# Patient Record
Sex: MALE | Race: WHITE | NOT HISPANIC OR LATINO | Employment: OTHER | URBAN - METROPOLITAN AREA
[De-identification: names, ages, dates, MRNs, and addresses within clinical notes are randomized per-mention and may not be internally consistent; named-entity substitution may affect disease eponyms.]

---

## 2017-02-10 ENCOUNTER — PATIENT MESSAGE (OUTPATIENT)
Dept: RESEARCH | Facility: HOSPITAL | Age: 77
End: 2017-02-10

## 2017-04-20 RX ORDER — HYDROCORTISONE 25 MG/G
CREAM TOPICAL
Qty: 28 G | Refills: 11 | Status: SHIPPED | OUTPATIENT
Start: 2017-04-20 | End: 2017-12-19

## 2017-04-20 NOTE — TELEPHONE ENCOUNTER
----- Message from Daiana Tucker sent at 4/20/2017 12:59 PM CDT -----  Contact: Patient: [phone]  Patient called and stated that he needs a refill on his eszopiclone (LUNESTA) 3 mg Tab, crestor, and altace medicine.    Pharmacy used is [facility] [address] - [address] [phone] (Phone) [phone] (Fax)    Patient can be reached at [phone]  Please call when done.    Thanks

## 2017-04-21 DIAGNOSIS — I25.10 CORONARY ARTERY DISEASE INVOLVING NATIVE CORONARY ARTERY WITHOUT ANGINA PECTORIS, UNSPECIFIED WHETHER NATIVE OR TRANSPLANTED HEART: ICD-10-CM

## 2017-04-21 RX ORDER — RAMIPRIL 5 MG/1
5 CAPSULE ORAL DAILY
Qty: 90 CAPSULE | Refills: 3 | Status: SHIPPED | OUTPATIENT
Start: 2017-04-21 | End: 2017-07-07 | Stop reason: SDUPTHER

## 2017-04-21 RX ORDER — ESZOPICLONE 3 MG/1
TABLET, FILM COATED ORAL
Qty: 30 TABLET | Refills: 4 | Status: SHIPPED | OUTPATIENT
Start: 2017-04-21 | End: 2017-09-10 | Stop reason: SDUPTHER

## 2017-04-21 NOTE — TELEPHONE ENCOUNTER
----- Message from Daiana Tucker sent at 4/20/2017 12:59 PM CDT -----  Contact: Patient: [phone]  Patient called and stated that he needs a refill on his eszopiclone (LUNESTA) 3 mg Tab, crestor, and altace medicine.    Pharmacy used is [facility] [address] - [address] [phone] (Phone) [phone] (Fax)    Patient can be reached at [phone]  Please call when done.    Thanks

## 2017-07-05 ENCOUNTER — LAB VISIT (OUTPATIENT)
Dept: LAB | Facility: HOSPITAL | Age: 77
End: 2017-07-05
Attending: INTERNAL MEDICINE
Payer: MEDICARE

## 2017-07-05 ENCOUNTER — CLINICAL SUPPORT (OUTPATIENT)
Dept: CARDIOLOGY | Facility: CLINIC | Age: 77
End: 2017-07-05
Payer: MEDICARE

## 2017-07-05 DIAGNOSIS — I25.10 CORONARY ARTERY DISEASE INVOLVING NATIVE CORONARY ARTERY WITHOUT ANGINA PECTORIS, UNSPECIFIED WHETHER NATIVE OR TRANSPLANTED HEART: ICD-10-CM

## 2017-07-05 DIAGNOSIS — E78.5 HYPERLIPIDEMIA: ICD-10-CM

## 2017-07-05 LAB
CHOLEST/HDLC SERPL: 2.4 {RATIO}
DIASTOLIC DYSFUNCTION: NO
HDL/CHOLESTEROL RATIO: 41.7 %
HDLC SERPL-MCNC: 144 MG/DL
HDLC SERPL-MCNC: 60 MG/DL
LDLC SERPL CALC-MCNC: 69.4 MG/DL
NONHDLC SERPL-MCNC: 84 MG/DL
RETIRED EF AND QEF - SEE NOTES: 60 (ref 55–65)
TRIGL SERPL-MCNC: 73 MG/DL

## 2017-07-05 PROCEDURE — 93321 DOPPLER ECHO F-UP/LMTD STD: CPT | Mod: 26,S$PBB,, | Performed by: INTERNAL MEDICINE

## 2017-07-05 PROCEDURE — 93351 STRESS TTE COMPLETE: CPT | Mod: PBBFAC,PO | Performed by: INTERNAL MEDICINE

## 2017-07-07 ENCOUNTER — OFFICE VISIT (OUTPATIENT)
Dept: CARDIOLOGY | Facility: CLINIC | Age: 77
End: 2017-07-07
Payer: MEDICARE

## 2017-07-07 VITALS
WEIGHT: 213.88 LBS | HEIGHT: 71 IN | DIASTOLIC BLOOD PRESSURE: 77 MMHG | SYSTOLIC BLOOD PRESSURE: 134 MMHG | BODY MASS INDEX: 29.94 KG/M2 | HEART RATE: 57 BPM | OXYGEN SATURATION: 97 %

## 2017-07-07 DIAGNOSIS — I25.10 CORONARY ARTERY DISEASE INVOLVING NATIVE CORONARY ARTERY WITHOUT ANGINA PECTORIS, UNSPECIFIED WHETHER NATIVE OR TRANSPLANTED HEART: ICD-10-CM

## 2017-07-07 DIAGNOSIS — I25.10 CORONARY ARTERY DISEASE, ANGINA PRESENCE UNSPECIFIED, UNSPECIFIED VESSEL OR LESION TYPE, UNSPECIFIED WHETHER NATIVE OR TRANSPLANTED HEART: Primary | ICD-10-CM

## 2017-07-07 DIAGNOSIS — E78.5 HYPERLIPIDEMIA, UNSPECIFIED HYPERLIPIDEMIA TYPE: ICD-10-CM

## 2017-07-07 DIAGNOSIS — E78.00 PURE HYPERCHOLESTEROLEMIA: ICD-10-CM

## 2017-07-07 DIAGNOSIS — E66.9 NON MORBID OBESITY, UNSPECIFIED OBESITY TYPE: ICD-10-CM

## 2017-07-07 DIAGNOSIS — I25.10 CORONARY ARTERY DISEASE INVOLVING NATIVE CORONARY ARTERY OF NATIVE HEART WITHOUT ANGINA PECTORIS: Primary | ICD-10-CM

## 2017-07-07 PROCEDURE — 1159F MED LIST DOCD IN RCRD: CPT | Mod: ,,, | Performed by: INTERNAL MEDICINE

## 2017-07-07 PROCEDURE — 1126F AMNT PAIN NOTED NONE PRSNT: CPT | Mod: ,,, | Performed by: INTERNAL MEDICINE

## 2017-07-07 PROCEDURE — 99999 PR PBB SHADOW E&M-EST. PATIENT-LVL IV: CPT | Mod: PBBFAC,,, | Performed by: INTERNAL MEDICINE

## 2017-07-07 PROCEDURE — 99214 OFFICE O/P EST MOD 30 MIN: CPT | Mod: PBBFAC | Performed by: INTERNAL MEDICINE

## 2017-07-07 PROCEDURE — 99213 OFFICE O/P EST LOW 20 MIN: CPT | Mod: S$PBB,,, | Performed by: INTERNAL MEDICINE

## 2017-07-07 RX ORDER — ROSUVASTATIN CALCIUM 20 MG/1
20 TABLET, COATED ORAL DAILY
Qty: 90 TABLET | Refills: 3 | Status: SHIPPED | OUTPATIENT
Start: 2017-07-07 | End: 2018-06-13 | Stop reason: SDUPTHER

## 2017-07-07 RX ORDER — RAMIPRIL 5 MG/1
5 CAPSULE ORAL DAILY
Qty: 90 CAPSULE | Refills: 3 | Status: SHIPPED | OUTPATIENT
Start: 2017-07-07 | End: 2018-07-03 | Stop reason: SDUPTHER

## 2017-07-07 RX ORDER — CODEINE PHOSPHATE AND GUAIFENESIN 10; 100 MG/5ML; MG/5ML
SOLUTION ORAL
Refills: 0 | COMMUNITY
Start: 2017-06-23 | End: 2017-09-20 | Stop reason: ALTCHOICE

## 2017-07-07 RX ORDER — ESZOPICLONE 3 MG/1
3 TABLET, FILM COATED ORAL NIGHTLY
Qty: 30 TABLET | Refills: 4 | OUTPATIENT
Start: 2017-07-07

## 2017-07-07 NOTE — PROGRESS NOTES
Subjective:   Patient ID:  Gurjit Valentin is a 76 y.o. male who presents for follow up of Coronary Artery Disease and Hyperlipidemia      Assessment:     1. Coronary artery disease involving native coronary artery of native heart without angina pectoris : No angina, no heart failure, negative stress echo for ischemia.   2. Pure hypercholesterolemia    3. Non morbid obesity, unspecified obesity type        Plan:     RTC 1 yr with FLP and stress echo.  Continue meds.        HPI: Recently retired half-time as  of [employer] for follow-up of 50% mid LAD stenosis.  Denies any symptoms of coronary ischemia or angina.    Review of Systems   Constitution: Negative for diaphoresis.   Cardiovascular: Negative for chest pain, claudication, cyanosis, dyspnea on exertion, irregular heartbeat, leg swelling, near-syncope, orthopnea and palpitations.   Respiratory: Negative for shortness of breath and wheezing.    All other systems reviewed and are negative.      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    Sleep apnea        Past Surgical History:   Procedure Laterality Date    BLADDER SURGERY      CARDIAC CATHETERIZATION      CYSTOSCOPY      EYE SURGERY      bilateral    KIDNEY STONE SURGERY      LITHOTRIPSY      TONSILLECTOMY      at age 4       Social History   Substance Use Topics    Smoking status: Former Smoker     Packs/day: 0.25     Years: 20.00     Quit date: 1/1/1988    Smokeless tobacco: Former User      Comment: STOPPED 30 YRS AGO.    Alcohol use 5.9 oz/week     9 Glasses of wine, 1 Standard drinks or equivalent per week       Family History   Problem Relation Age of Onset    Hypertension Mother     Hypertension Brother     Heart attack Brother   "   No Known Problems Father     No Known Problems Maternal Grandmother     No Known Problems Maternal Grandfather     No Known Problems Paternal Grandmother     No Known Problems Paternal Grandfather     No Known Problems Sister     No Known Problems Maternal Aunt     No Known Problems Maternal Uncle     No Known Problems Paternal Aunt     No Known Problems Paternal Uncle     Colon polyps Neg Hx     Cancer Neg Hx     Heart disease Neg Hx     Anemia Neg Hx     Arrhythmia Neg Hx     Asthma Neg Hx     Clotting disorder Neg Hx     Fainting Neg Hx     Heart failure Neg Hx     Hyperlipidemia Neg Hx        Current Outpatient Prescriptions   Medication Sig    aspirin (ECOTRIN) 81 MG EC tablet Take 81 mg by mouth once daily.    eszopiclone 3 mg Tab take 1 tablet by mouth every evening    guaifenesin-codeine 100-10 mg/5 ml (TUSSI-ORGANIDIN NR)  mg/5 mL syrup take 5 milliliters by mouth every 8 hours if needed for cough    omega-3 fatty acids (FISH OIL) 300 mg Cap Take 300 mg by mouth.    ramipril (ALTACE) 5 MG capsule Take 1 capsule (5 mg total) by mouth once daily.    rosuvastatin (CRESTOR) 20 MG tablet Take 1 tablet (20 mg total) by mouth once daily.    desonide (DESOWEN) 0.05 % cream Apply 1 application topically Daily.    hydrocortisone 2.5 % cream apply to affected area ON FACE AND AROUND EARS twice a day if needed for inflammation and FLAKES    mupirocin (BACTROBAN) 2 % ointment apply to AFFECTED SPOT ON ARM three to four times a day    naproxen (NAPROSYN) 375 MG tablet take 1 tablet by mouth three times a day if needed for pain     No current facility-administered medications for this visit.        Review of patient's allergies indicates:  No Known Allergies    Objective:     /77 (BP Location: Left arm, Patient Position: Sitting, BP Method: Automatic)   Pulse (!) 57   Ht 5' 11" (1.803 m)   Wt 97 kg (213 lb 13.5 oz)   SpO2 97%   BMI 29.83 kg/m²     Physical Exam "   Constitutional: He is oriented to person, place, and time. He appears well-developed and well-nourished.   HENT:   Head: Normocephalic and atraumatic.   Eyes: Conjunctivae and EOM are normal. Pupils are equal, round, and reactive to light.   Neck: Normal range of motion. Neck supple. No thyromegaly present.   Cardiovascular: Normal rate, regular rhythm and normal heart sounds.  Exam reveals no gallop and no friction rub.    No murmur heard.  Pulmonary/Chest: Effort normal and breath sounds normal. No respiratory distress. He has no wheezes. He has no rales. He exhibits no tenderness.   Abdominal: Soft. Bowel sounds are normal. He exhibits no distension. There is no tenderness.   Musculoskeletal: Normal range of motion.   Neurological: He is alert and oriented to person, place, and time. He has normal reflexes. No cranial nerve deficit. Coordination normal.   Skin: Skin is warm and dry.   Psychiatric: He has a normal mood and affect. His behavior is normal. Judgment and thought content normal.         Chemistry        Component Value Date/Time     11/04/2016 0801    K 4.9 11/04/2016 0801     11/04/2016 0801    CO2 25 11/04/2016 0801    BUN 19 11/04/2016 0801    CREATININE 1.1 11/04/2016 0801     (H) 11/04/2016 0801        Component Value Date/Time    CALCIUM 9.0 11/04/2016 0801    ALKPHOS 34 (L) 11/04/2016 0801    AST 22 11/04/2016 0801    ALT 20 11/04/2016 0801    BILITOT 0.4 11/04/2016 0801    ESTGFRAFRICA >60.0 11/04/2016 0801    EGFRNONAA >60.0 11/04/2016 0801            Lab Results   Component Value Date    CHOL 144 07/05/2017    CHOL 153 07/21/2016    CHOL 130 07/17/2015     Lab Results   Component Value Date    HDL 60 07/05/2017    HDL 67 07/21/2016    HDL 56 07/17/2015     Lab Results   Component Value Date    LDLCALC 69.4 07/05/2017    LDLCALC 72.0 07/21/2016    LDLCALC 61.4 (L) 07/17/2015     Lab Results   Component Value Date    TRIG 73 07/05/2017    TRIG 70 07/21/2016    TRIG 63  07/17/2015     Lab Results   Component Value Date    CHOLHDL 41.7 07/05/2017    CHOLHDL 43.8 07/21/2016    CHOLHDL 43.1 07/17/2015         Lab Results   Component Value Date     11/04/2016    K 4.9 11/04/2016     11/04/2016    CO2 25 11/04/2016    BUN 19 11/04/2016    CREATININE 1.1 11/04/2016     (H) 11/04/2016    HGBA1C 6.4 (H) 11/04/2016    AST 22 11/04/2016    ALT 20 11/04/2016    ALBUMIN 3.7 11/04/2016    PROT 6.7 11/04/2016    BILITOT 0.4 11/04/2016    WBC 5.72 11/04/2016    HGB 13.9 (L) 11/04/2016    HCT 40.9 11/04/2016    MCV 93 11/04/2016     11/04/2016    INR 1.0 06/01/2010    PSA 1.5 11/04/2016    TSH 1.064 02/25/2013    CHOL 144 07/05/2017    HDL 60 07/05/2017    LDLCALC 69.4 07/05/2017    TRIG 73 07/05/2017

## 2017-09-05 ENCOUNTER — TELEPHONE (OUTPATIENT)
Dept: INFECTIOUS DISEASES | Facility: CLINIC | Age: 77
End: 2017-09-05

## 2017-09-05 DIAGNOSIS — R73.09 ELEVATED HEMOGLOBIN A1C: ICD-10-CM

## 2017-09-05 DIAGNOSIS — C67.9 MALIGNANT NEOPLASM OF URINARY BLADDER, UNSPECIFIED SITE: ICD-10-CM

## 2017-09-05 DIAGNOSIS — E78.00 PURE HYPERCHOLESTEROLEMIA: ICD-10-CM

## 2017-09-05 DIAGNOSIS — N20.0 RECURRENT NEPHROLITHIASIS: Primary | Chronic | ICD-10-CM

## 2017-09-05 DIAGNOSIS — I25.10 CORONARY ARTERY DISEASE INVOLVING NATIVE CORONARY ARTERY OF NATIVE HEART WITHOUT ANGINA PECTORIS: ICD-10-CM

## 2017-09-05 DIAGNOSIS — Z12.5 ENCOUNTER FOR PROSTATE CANCER SCREENING: ICD-10-CM

## 2017-09-06 ENCOUNTER — PATIENT MESSAGE (OUTPATIENT)
Dept: INFECTIOUS DISEASES | Facility: CLINIC | Age: 77
End: 2017-09-06

## 2017-09-06 NOTE — TELEPHONE ENCOUNTER
----- Message from Kamala Edward MA sent at 9/5/2017 11:39 AM CDT -----  Contact: Gurjit   [phone]   ANNUAL EXAM 09/20, PLEASE ADD LABS.   ----- Message -----  From: Radha Kanwal  Sent: 9/5/2017  11:29 AM  To: HAN Abebe's pt./  Pt.says he wants to do annual labs on same day as f/u on 9/20.     Seeing you at 2pm.   Can he do labs for you at 1pm same day?   Pls call and confirm this w/  Him.   Already did EKG previously.

## 2017-09-10 RX ORDER — ESZOPICLONE 3 MG/1
TABLET, FILM COATED ORAL
Qty: 30 TABLET | Refills: 4 | Status: SHIPPED | OUTPATIENT
Start: 2017-09-10 | End: 2018-02-12 | Stop reason: SDUPTHER

## 2017-09-20 ENCOUNTER — OFFICE VISIT (OUTPATIENT)
Dept: INFECTIOUS DISEASES | Facility: CLINIC | Age: 77
End: 2017-09-20
Payer: MEDICARE

## 2017-09-20 ENCOUNTER — LAB VISIT (OUTPATIENT)
Dept: LAB | Facility: HOSPITAL | Age: 77
End: 2017-09-20
Attending: INTERNAL MEDICINE
Payer: MEDICARE

## 2017-09-20 VITALS
SYSTOLIC BLOOD PRESSURE: 120 MMHG | HEIGHT: 71 IN | BODY MASS INDEX: 30.37 KG/M2 | TEMPERATURE: 98 F | HEART RATE: 59 BPM | WEIGHT: 216.94 LBS | DIASTOLIC BLOOD PRESSURE: 74 MMHG

## 2017-09-20 DIAGNOSIS — C67.9 MALIGNANT NEOPLASM OF URINARY BLADDER, UNSPECIFIED SITE: ICD-10-CM

## 2017-09-20 DIAGNOSIS — E78.00 PURE HYPERCHOLESTEROLEMIA: ICD-10-CM

## 2017-09-20 DIAGNOSIS — G45.4 TRANSIENT GLOBAL AMNESIA: ICD-10-CM

## 2017-09-20 DIAGNOSIS — L30.9 DERMATITIS: ICD-10-CM

## 2017-09-20 DIAGNOSIS — I25.10 CORONARY ARTERY DISEASE INVOLVING NATIVE CORONARY ARTERY OF NATIVE HEART WITHOUT ANGINA PECTORIS: ICD-10-CM

## 2017-09-20 DIAGNOSIS — N20.0 RECURRENT NEPHROLITHIASIS: Chronic | ICD-10-CM

## 2017-09-20 DIAGNOSIS — R73.09 ELEVATED HEMOGLOBIN A1C: ICD-10-CM

## 2017-09-20 DIAGNOSIS — Z12.11 COLON CANCER SCREENING: Primary | ICD-10-CM

## 2017-09-20 DIAGNOSIS — Z12.5 ENCOUNTER FOR PROSTATE CANCER SCREENING: ICD-10-CM

## 2017-09-20 LAB
ALBUMIN SERPL BCP-MCNC: 3.6 G/DL
ALP SERPL-CCNC: 38 U/L
ALT SERPL W/O P-5'-P-CCNC: 16 U/L
ANION GAP SERPL CALC-SCNC: 6 MMOL/L
AST SERPL-CCNC: 18 U/L
BASOPHILS # BLD AUTO: 0.09 K/UL
BASOPHILS NFR BLD: 1.6 %
BILIRUB SERPL-MCNC: 0.4 MG/DL
BUN SERPL-MCNC: 19 MG/DL
CALCIUM SERPL-MCNC: 9.2 MG/DL
CHLORIDE SERPL-SCNC: 106 MMOL/L
CHOLEST SERPL-MCNC: 148 MG/DL
CHOLEST/HDLC SERPL: 2.3 {RATIO}
CO2 SERPL-SCNC: 26 MMOL/L
COMPLEXED PSA SERPL-MCNC: 1.2 NG/ML
CREAT SERPL-MCNC: 0.9 MG/DL
DIFFERENTIAL METHOD: ABNORMAL
EOSINOPHIL # BLD AUTO: 0.3 K/UL
EOSINOPHIL NFR BLD: 5.2 %
ERYTHROCYTE [DISTWIDTH] IN BLOOD BY AUTOMATED COUNT: 14.4 %
ERYTHROCYTE [SEDIMENTATION RATE] IN BLOOD BY WESTERGREN METHOD: 6 MM/HR
EST. GFR  (AFRICAN AMERICAN): >60 ML/MIN/1.73 M^2
EST. GFR  (NON AFRICAN AMERICAN): >60 ML/MIN/1.73 M^2
ESTIMATED AVG GLUCOSE: 120 MG/DL
GLUCOSE SERPL-MCNC: 119 MG/DL
HBA1C MFR BLD HPLC: 5.8 %
HCT VFR BLD AUTO: 40.8 %
HDLC SERPL-MCNC: 65 MG/DL
HDLC SERPL: 43.9 %
HGB BLD-MCNC: 14.2 G/DL
LDLC SERPL CALC-MCNC: 73 MG/DL
LYMPHOCYTES # BLD AUTO: 1.7 K/UL
LYMPHOCYTES NFR BLD: 30.5 %
MCH RBC QN AUTO: 31 PG
MCHC RBC AUTO-ENTMCNC: 34.8 G/DL
MCV RBC AUTO: 89 FL
MONOCYTES # BLD AUTO: 0.4 K/UL
MONOCYTES NFR BLD: 7.5 %
NEUTROPHILS # BLD AUTO: 3.1 K/UL
NEUTROPHILS NFR BLD: 55 %
NONHDLC SERPL-MCNC: 83 MG/DL
PLATELET # BLD AUTO: 187 K/UL
PMV BLD AUTO: 9.7 FL
POTASSIUM SERPL-SCNC: 4.4 MMOL/L
PROT SERPL-MCNC: 6.8 G/DL
RBC # BLD AUTO: 4.58 M/UL
SODIUM SERPL-SCNC: 138 MMOL/L
TRIGL SERPL-MCNC: 50 MG/DL
WBC # BLD AUTO: 5.6 K/UL

## 2017-09-20 PROCEDURE — 85025 COMPLETE CBC W/AUTO DIFF WBC: CPT

## 2017-09-20 PROCEDURE — 36415 COLL VENOUS BLD VENIPUNCTURE: CPT

## 2017-09-20 PROCEDURE — 99999 PR PBB SHADOW E&M-EST. PATIENT-LVL III: CPT | Mod: PBBFAC,,, | Performed by: INTERNAL MEDICINE

## 2017-09-20 PROCEDURE — 99213 OFFICE O/P EST LOW 20 MIN: CPT | Mod: PBBFAC | Performed by: INTERNAL MEDICINE

## 2017-09-20 PROCEDURE — 85651 RBC SED RATE NONAUTOMATED: CPT

## 2017-09-20 PROCEDURE — 1126F AMNT PAIN NOTED NONE PRSNT: CPT | Mod: ,,, | Performed by: INTERNAL MEDICINE

## 2017-09-20 PROCEDURE — 1159F MED LIST DOCD IN RCRD: CPT | Mod: ,,, | Performed by: INTERNAL MEDICINE

## 2017-09-20 PROCEDURE — 80061 LIPID PANEL: CPT

## 2017-09-20 PROCEDURE — 84153 ASSAY OF PSA TOTAL: CPT

## 2017-09-20 PROCEDURE — 80053 COMPREHEN METABOLIC PANEL: CPT

## 2017-09-20 PROCEDURE — 99215 OFFICE O/P EST HI 40 MIN: CPT | Mod: S$PBB,,, | Performed by: INTERNAL MEDICINE

## 2017-09-20 PROCEDURE — 83036 HEMOGLOBIN GLYCOSYLATED A1C: CPT

## 2017-09-20 RX ORDER — AMOXICILLIN AND CLAVULANATE POTASSIUM 500; 125 MG/1; MG/1
1 TABLET, FILM COATED ORAL 2 TIMES DAILY
Refills: 0 | COMMUNITY
Start: 2017-06-23 | End: 2017-09-20 | Stop reason: ALTCHOICE

## 2017-09-20 RX ORDER — BENZONATATE 100 MG/1
CAPSULE ORAL
Refills: 0 | COMMUNITY
Start: 2017-06-23 | End: 2017-09-20 | Stop reason: ALTCHOICE

## 2017-09-20 RX ORDER — PREDNISONE 20 MG/1
TABLET ORAL
Refills: 0 | COMMUNITY
Start: 2017-06-23 | End: 2017-09-20

## 2017-09-20 NOTE — PROGRESS NOTES
Subjective:      Patient ID: Gurjit Valentin is a 77 y.o. male.    Chief Complaint:Annual Exam      History of Present Illness    Transient global amnesia  Pt had a spell of TGA while attending his brother who was dying. Had onset of amnesia and was briefly admitted to [facility] 4/24/17 and a full neurological workup was negative. He has a similar spell of TGA in 2004, also with a negative neuro w/u.  He brought in study results from [place] which included:  CT head w/wo contrast  CTA head and neck  CT brain perfusion with contrast    Coronary artery disease: Known 50% LAD stenosis.  Pt saw Dr. Tucker 7/7/17 and he felt that all was doing well from CAD standpoint. See his note. Pt is exercising on [employer]  25-30 min three times weekly. Has lost 17 lb since last year on ideal protein diet. He is on ramipril and his BP runs 120-130/70s.     Bladder cancer  Last visit with Dr. Kyle was 12/13/16 and cysto was negative for any sign of tumor recurrence. A rescreening interval of one year was recommended         Dermatitis  Has scaly rash on left posterior thigh and a few smaller patches on lateral leg. Suspicious for psoriasis. He has seen Dr. Mcclendon, his dermatologist, but topicals he is using have not solved the problem.     HTN  His BP appears well controlled on the ramipril 5 mg.     The laboratory studies were reviewed and discussed with the pt.  Last colonoscopy was about 7 years ago, so will place referral for another.      Review of Systems   Constitution: Negative for chills, decreased appetite, fever, weakness, malaise/fatigue, night sweats, weight gain and weight loss.   HENT: Negative for congestion, ear pain, hearing loss, hoarse voice, sore throat and tinnitus.    Eyes: Negative for blurred vision, redness and visual disturbance.   Cardiovascular: Negative for chest pain, leg swelling and palpitations.   Respiratory: Negative for cough, hemoptysis, shortness of breath and sputum  production.    Hematologic/Lymphatic: Negative for adenopathy. Does not bruise/bleed easily.   Skin: Positive for rash. Negative for dry skin, itching and suspicious lesions.   Musculoskeletal: Negative for back pain, joint pain, myalgias and neck pain.   Gastrointestinal: Negative for abdominal pain, constipation, diarrhea, heartburn, nausea and vomiting.   Genitourinary: Negative for dysuria, flank pain, frequency, hematuria, hesitancy and urgency.   Neurological: Negative for dizziness, headaches, numbness and paresthesias.   Psychiatric/Behavioral: Negative for depression and memory loss. The patient does not have insomnia and is not nervous/anxious.      Objective:   Physical Exam   Constitutional: He is oriented to person, place, and time. He appears well-developed and well-nourished.   HENT:   Head: Normocephalic and atraumatic.   Right Ear: External ear normal.   Left Ear: External ear normal.   Mouth/Throat: Oropharynx is clear and moist.   Eyes: Conjunctivae and EOM are normal. Pupils are equal, round, and reactive to light.   Neck: Normal range of motion. Neck supple. No thyromegaly present.   Cardiovascular: Normal rate, regular rhythm and normal heart sounds.    No murmur heard.  Pulmonary/Chest: Effort normal and breath sounds normal. He has no wheezes. He has no rales.   Abdominal: Soft. Bowel sounds are normal. He exhibits no mass. There is no tenderness. There is no rebound.   Musculoskeletal: Normal range of motion.   Lymphadenopathy:     He has no cervical adenopathy.   Neurological: He is alert and oriented to person, place, and time.   Skin: Skin is warm and dry.   Psoriasiform plaques on legs   Psychiatric: He has a normal mood and affect. His behavior is normal.   Vitals reviewed.    Assessment:       1. Colon cancer screening    2. Transient global amnesia , resolved   3. Dermatitis    4. Coronary artery disease involving native coronary artery of native heart without angina pectoris    5.  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 Continue present meds   2. Screening colonoscopy   3. F/U cysto with Dr. Kyle should be scheduled in Dec

## 2017-09-21 ENCOUNTER — PATIENT MESSAGE (OUTPATIENT)
Dept: INFECTIOUS DISEASES | Facility: CLINIC | Age: 77
End: 2017-09-21

## 2017-10-18 ENCOUNTER — TELEPHONE (OUTPATIENT)
Dept: ENDOSCOPY | Facility: HOSPITAL | Age: 77
End: 2017-10-18

## 2017-11-08 ENCOUNTER — TELEPHONE (OUTPATIENT)
Dept: INFECTIOUS DISEASES | Facility: CLINIC | Age: 77
End: 2017-11-08

## 2017-11-08 ENCOUNTER — TELEPHONE (OUTPATIENT)
Dept: UROLOGY | Facility: CLINIC | Age: 77
End: 2017-11-08

## 2017-11-08 DIAGNOSIS — Z12.11 COLON CANCER SCREENING: Primary | ICD-10-CM

## 2017-11-08 NOTE — TELEPHONE ENCOUNTER
----- Message from Trinh Waite MA sent at 11/8/2017 11:13 AM CST -----  Contact: Mobile: [phone] call after 3:30 p.m.  Calling to set up his December cysto recall.     Mobile: [phone] call after 3:30 p.m.

## 2017-11-10 DIAGNOSIS — Z12.11 SPECIAL SCREENING FOR MALIGNANT NEOPLASMS, COLON: Primary | ICD-10-CM

## 2017-11-10 RX ORDER — POLYETHYLENE GLYCOL 3350, SODIUM SULFATE ANHYDROUS, SODIUM BICARBONATE, SODIUM CHLORIDE, POTASSIUM CHLORIDE 236; 22.74; 6.74; 5.86; 2.97 G/4L; G/4L; G/4L; G/4L; G/4L
4 POWDER, FOR SOLUTION ORAL ONCE
Qty: 4000 ML | Refills: 0 | Status: SHIPPED | OUTPATIENT
Start: 2017-11-10 | End: 2017-11-10

## 2017-11-13 ENCOUNTER — TELEPHONE (OUTPATIENT)
Dept: UROLOGY | Facility: CLINIC | Age: 77
End: 2017-11-13

## 2017-11-13 NOTE — TELEPHONE ENCOUNTER
----- Message from Connie Wan sent at 11/10/2017  2:31 PM CST -----  Contact: pt [phone]  Pt states 12/14/17 cysto doesn't work for him. Pt is asking to reschedule too 12/28/17 in the morning or sometime in January. Please call pt.    Pt is aware that Nurse Dominick is out of the office on today.

## 2017-12-04 ENCOUNTER — HOSPITAL ENCOUNTER (OUTPATIENT)
Facility: HOSPITAL | Age: 77
Discharge: HOME | End: 2017-12-04
Attending: INTERNAL MEDICINE | Admitting: INTERNAL MEDICINE
Payer: MEDICARE

## 2017-12-04 ENCOUNTER — ANESTHESIA (OUTPATIENT)
Dept: ENDOSCOPY | Facility: HOSPITAL | Age: 77
End: 2017-12-04
Payer: MEDICARE

## 2017-12-04 ENCOUNTER — ANESTHESIA EVENT (OUTPATIENT)
Dept: ENDOSCOPY | Facility: HOSPITAL | Age: 77
End: 2017-12-04
Payer: MEDICARE

## 2017-12-04 ENCOUNTER — SURGERY (OUTPATIENT)
Age: 77
End: 2017-12-04

## 2017-12-04 VITALS
WEIGHT: 210 LBS | OXYGEN SATURATION: 98 % | HEIGHT: 71 IN | DIASTOLIC BLOOD PRESSURE: 73 MMHG | HEART RATE: 54 BPM | BODY MASS INDEX: 29.4 KG/M2 | SYSTOLIC BLOOD PRESSURE: 141 MMHG | TEMPERATURE: 98 F | RESPIRATION RATE: 18 BRPM

## 2017-12-04 DIAGNOSIS — I25.10 CORONARY ARTERY DISEASE INVOLVING NATIVE CORONARY ARTERY OF NATIVE HEART WITHOUT ANGINA PECTORIS: Primary | ICD-10-CM

## 2017-12-04 PROCEDURE — 45385 COLONOSCOPY W/LESION REMOVAL: CPT | Performed by: INTERNAL MEDICINE

## 2017-12-04 PROCEDURE — 37000008 HC ANESTHESIA 1ST 15 MINUTES: Performed by: INTERNAL MEDICINE

## 2017-12-04 PROCEDURE — 37000009 HC ANESTHESIA EA ADD 15 MINS: Performed by: INTERNAL MEDICINE

## 2017-12-04 PROCEDURE — 88305 TISSUE EXAM BY PATHOLOGIST: CPT | Performed by: PATHOLOGY

## 2017-12-04 PROCEDURE — 27201089 HC SNARE, DISP (ANY): Performed by: INTERNAL MEDICINE

## 2017-12-04 PROCEDURE — 25000003 PHARM REV CODE 250: Performed by: INTERNAL MEDICINE

## 2017-12-04 PROCEDURE — 88305 TISSUE EXAM BY PATHOLOGIST: CPT | Mod: 26,,, | Performed by: PATHOLOGY

## 2017-12-04 PROCEDURE — D9220A PRA ANESTHESIA: Mod: PT,CRNA,, | Performed by: NURSE ANESTHETIST, CERTIFIED REGISTERED

## 2017-12-04 PROCEDURE — 63600175 PHARM REV CODE 636 W HCPCS: Performed by: NURSE ANESTHETIST, CERTIFIED REGISTERED

## 2017-12-04 PROCEDURE — D9220A PRA ANESTHESIA: Mod: PT,ANES,, | Performed by: ANESTHESIOLOGY

## 2017-12-04 PROCEDURE — 45385 COLONOSCOPY W/LESION REMOVAL: CPT | Mod: PT,,, | Performed by: INTERNAL MEDICINE

## 2017-12-04 RX ORDER — LIDOCAINE HCL/PF 100 MG/5ML
SYRINGE (ML) INTRAVENOUS
Status: DISCONTINUED | OUTPATIENT
Start: 2017-12-04 | End: 2017-12-04

## 2017-12-04 RX ORDER — SODIUM CHLORIDE 9 MG/ML
INJECTION, SOLUTION INTRAVENOUS CONTINUOUS
Status: DISCONTINUED | OUTPATIENT
Start: 2017-12-04 | End: 2017-12-04 | Stop reason: HOSPADM

## 2017-12-04 RX ORDER — PROPOFOL 10 MG/ML
VIAL (ML) INTRAVENOUS
Status: DISCONTINUED | OUTPATIENT
Start: 2017-12-04 | End: 2017-12-04

## 2017-12-04 RX ADMIN — LIDOCAINE HYDROCHLORIDE 75 MG: 20 INJECTION, SOLUTION INTRAVENOUS at 07:12

## 2017-12-04 RX ADMIN — SODIUM CHLORIDE: 0.9 INJECTION, SOLUTION INTRAVENOUS at 07:12

## 2017-12-04 RX ADMIN — PROPOFOL 20 MG: 10 INJECTION, EMULSION INTRAVENOUS at 07:12

## 2017-12-04 RX ADMIN — PROPOFOL 30 MG: 10 INJECTION, EMULSION INTRAVENOUS at 07:12

## 2017-12-04 RX ADMIN — PROPOFOL 50 MG: 10 INJECTION, EMULSION INTRAVENOUS at 07:12

## 2017-12-04 NOTE — ANESTHESIA POSTPROCEDURE EVALUATION
"Anesthesia Post Evaluation    Patient: Gurjit Valentin    Procedure(s) Performed: Procedure(s) (LRB):  COLONOSCOPY (N/A)    Final Anesthesia Type: general  Patient location during evaluation: PACU  Patient participation: Yes- Able to Participate  Level of consciousness: awake and alert  Post-procedure vital signs: reviewed and stable  Pain management: adequate  Airway patency: patent  PONV status at discharge: No PONV  Anesthetic complications: no      Cardiovascular status: blood pressure returned to baseline  Respiratory status: unassisted  Hydration status: euvolemic  Follow-up not needed.        Visit Vitals  BP (!) 141/73   Pulse (!) 54   Temp 36.7 °C (98 °F) (Oral)   Resp 18   Ht 5' 11" (1.803 m)   Wt 95.3 kg (210 lb)   SpO2 98%   BMI 29.29 kg/m²       Pain/Marybel Score: Pain Assessment Performed: Yes (12/4/2017  8:39 AM)  Presence of Pain: denies (12/4/2017  8:39 AM)  Pain Rating Prior to Med Admin: 0 (12/4/2017  7:07 AM)  Marybel Score: 10 (12/4/2017  8:13 AM)      "

## 2017-12-04 NOTE — TRANSFER OF CARE
"Anesthesia Transfer of Care Note    Patient: Gurjit Valentin    Procedure(s) Performed: Procedure(s) (LRB):  COLONOSCOPY (N/A)    Patient location: OPS    Anesthesia Type: general    Transport from OR: Transported from OR on room air with adequate spontaneous ventilation    Post pain: adequate analgesia    Post assessment: no apparent anesthetic complications    Post vital signs: stable    Level of consciousness: awake    Nausea/Vomiting: no nausea/vomiting    Complications: none    Transfer of care protocol was followed      Last vitals:   Visit Vitals  /68 (BP Location: Left arm, Patient Position: Lying)   Pulse 61   Temp 36.7 °C (98 °F) (Oral)   Resp 16   Ht 5' 11" (1.803 m)   Wt 95.3 kg (210 lb)   SpO2 98%   BMI 29.29 kg/m²     "

## 2017-12-04 NOTE — ANESTHESIA PREPROCEDURE EVALUATION
12/04/2017  Gurjit Valentin is a 77 y.o., male.  [facility]  Anesthesia Pre-Operative Evaluation         Patient Name: Gurjit Valentin  YOB: 1940  MRN: [id number]    SUBJECTIVE:     Pre-operative evaluation for Procedure(s) (LRB):  COLONOSCOPY (N/A)     12/04/2017    Gurjit Valentin is a 77 y.o. male w/ a significant PMHx of HTN, Hyperlipidemia, CAD, former smoker.    Patient now presents for the above procedure(s).      LDA: None documented.    Prev airway: None documented.    Drips: None documented.    Patient Active Problem List   Diagnosis    Coronary artery disease: Known 50% LAD stenosis.    Insomnia    Back pain of thoracolumbar region    Hyperlipidemia: LDL at goal    Obese    DELVIN (obstructive sleep apnea)    Bladder cancer    AR (allergic rhinitis)    Recurrent nephrolithiasis    Meralgia paresthetica of left side    Dermatitis    Transient global amnesia       Review of patient's allergies indicates:  No Known Allergies    No current facility-administered medications for this encounter.     Current Outpatient Prescriptions:     aspirin (ECOTRIN) 81 MG EC tablet, Take 81 mg by mouth once daily., Disp: , Rfl:     desonide (DESOWEN) 0.05 % cream, Apply 1 application topically Daily., Disp: , Rfl:     eszopiclone 3 mg Tab, take 1 tablet by mouth every evening, Disp: 30 tablet, Rfl: 4    hydrocortisone 2.5 % cream, apply to affected area ON FACE AND AROUND EARS twice a day if needed for inflammation and FLAKES, Disp: 28 g, Rfl: 11    omega-3 fatty acids (FISH OIL) 300 mg Cap, Take 300 mg by mouth., Disp: , Rfl:     ramipril (ALTACE) 5 MG capsule, Take 1 capsule (5 mg total) by mouth once daily., Disp: 90 capsule, Rfl: 3    rosuvastatin (CRESTOR) 20 MG tablet, Take 1 tablet (20 mg total) by mouth once daily., Disp: 90 tablet, Rfl: 3    No current  facility-administered medications on file prior to encounter.      Current Outpatient Prescriptions on File Prior to Encounter   Medication Sig Dispense Refill    aspirin (ECOTRIN) 81 MG EC tablet Take 81 mg by mouth once daily.      desonide (DESOWEN) 0.05 % cream Apply 1 application topically Daily.      eszopiclone 3 mg Tab take 1 tablet by mouth every evening 30 tablet 4    hydrocortisone 2.5 % cream apply to affected area ON FACE AND AROUND EARS twice a day if needed for inflammation and FLAKES 28 g 11    omega-3 fatty acids (FISH OIL) 300 mg Cap Take 300 mg by mouth.      ramipril (ALTACE) 5 MG capsule Take 1 capsule (5 mg total) by mouth once daily. 90 capsule 3    rosuvastatin (CRESTOR) 20 MG tablet Take 1 tablet (20 mg total) by mouth once daily. 90 tablet 3       Past Surgical History:   Procedure Laterality Date    BLADDER SURGERY      CARDIAC CATHETERIZATION      CYSTOSCOPY      EYE SURGERY      bilateral    KIDNEY STONE SURGERY      LITHOTRIPSY      TONSILLECTOMY      at age 4       Social History     Social History    Marital status:      Spouse name: Teresa    Number of children: N/A    Years of education: N/A     Occupational History     [employer]     Social History Main Topics    Smoking status: Former Smoker     Packs/day: 0.25     Years: 20.00     Quit date: 1/1/1988    Smokeless tobacco: Former User      Comment: STOPPED 30 YRS AGO.    Alcohol use 5.9 oz/week     9 Glasses of wine, 1 Standard drinks or equivalent per week    Drug use: No    Sexual activity: Not on file     Other Topics Concern    Not on file     Social History Narrative    No narrative on file       OBJECTIVE:     Vital Signs Range (Last 24H):  BP: ()/()   Arterial Line BP: ()/()       Significant Labs:  Lab Results   Component Value Date    WBC 5.60 09/20/2017    HGB 14.2 09/20/2017    HCT 40.8 09/20/2017     09/20/2017    CHOL 148 09/20/2017     TRIG 50 09/20/2017    HDL 65 09/20/2017    ALT 16 09/20/2017    AST 18 09/20/2017     09/20/2017    K 4.4 09/20/2017     09/20/2017    CREATININE 0.9 09/20/2017    BUN 19 09/20/2017    CO2 26 09/20/2017    TSH 1.064 02/25/2013    PSA 1.2 09/20/2017    INR 1.0 06/01/2010    HGBA1C 5.8 (H) 09/20/2017       Diagnostic Studies: No relevant studies.    EKG: No recent studies available.    2D ECHO:  No results found for this or any previous visit.       ASSESSMENT/PLAN:     Anesthesia Evaluation    I have reviewed the Patient Summary Reports.     I have reviewed the Medications.     Review of Systems  Anesthesia Hx:  No problems with previous Anesthesia  History of prior surgery of interest to airway management or planning: chin implant. Previous anesthesia: General   Social:  Former Smoker, No Alcohol Use    Hematology/Oncology:  Hematology Normal   Oncology Normal     EENT/Dental:EENT/Dental Normal   Cardiovascular:   Exercise tolerance: good Hypertension, well controlled CAD asymptomatic     Pulmonary:   Sleep Apnea    Renal/:   Chronic Renal Disease renal calculi    Hepatic/GI:  Hepatic/GI Normal    Musculoskeletal:  Musculoskeletal Normal    Neurological:   Neuromuscular Disease,    Endocrine:  Endocrine Normal    Dermatological:  Skin Normal    Psych:  Psychiatric Normal           Physical Exam  General:  Well nourished    Airway/Jaw/Neck:  Airway Findings: Mouth Opening: Normal Tongue: Normal  General Airway Assessment: Adult  Mallampati: II  Jaw/Neck Findings:  Neck ROM: Normal ROM      Dental:  Dental Findings: In tact, Upper Dentures        Mental Status:  Mental Status Findings:  Cooperative, Alert and Oriented         Anesthesia Plan  Type of Anesthesia, risks & benefits discussed:  Anesthesia Type:  general  Patient's Preference: GA  Intra-op Monitoring Plan: standard ASA monitors  Intra-op Monitoring Plan Comments:   Post Op Pain Control Plan:   Post Op Pain Control Plan Comments:   Induction:    IV  Beta Blocker:  Patient is not currently on a Beta-Blocker (No further documentation required).       Informed Consent: Patient understands risks and agrees with Anesthesia plan.  Questions answered. Anesthesia consent signed with patient.  ASA Score: 3     Day of Surgery Review of History & Physical:  There are no significant changes.  H&P update referred to the provider.         Ready For Surgery From Anesthesia Perspective.

## 2017-12-04 NOTE — H&P
Short Stay Endoscopy History and Physical    PCP - Gato Johnson MD    Procedure - Colonoscopy  ASA - per anesthesia  Mallampati - per anesthesia  History of Anesthesia problems - no  Family history Anesthesia problems - no   Plan of anesthesia - General    HPI:  This is a 77 y.o. male here for evaluation of : asymptomatic screening exam      ROS:  Constitutional: No fevers, chills, No weight loss  CV: No chest pain  Pulm: No cough, No shortness of breath  GI: see HPI  Derm: No rash    Medical History:  has a past medical history of Allergy; Cancer; Cancer; Cataract; Coronary artery disease; Hyperlipidemia; Hypertension; Increased prostate specific antigen (PSA) velocity (10/15/2015); Kidney stone; Recurrent nephrolithiasis (10/6/2014); and Sleep apnea.    Surgical History:  has a past surgical history that includes Eye surgery; Tonsillectomy; Lithotripsy; Kidney stone surgery; Bladder surgery; Cystoscopy; and Cardiac catheterization.    Family History: family history includes Heart attack in his brother; Hypertension in his brother and mother; No Known Problems in his father, maternal aunt, maternal grandfather, maternal grandmother, maternal uncle, paternal aunt, paternal grandfather, paternal grandmother, paternal uncle, and sister.. Otherwise no colon cancer, inflammatory bowel disease, or GI malignancies.    Social History:  reports that he quit smoking about 29 years ago. He has a 5.00 pack-year smoking history. He has quit using smokeless tobacco. He reports that he drinks about 5.9 oz of alcohol per week . He reports that he does not use drugs.    Review of patient's allergies indicates:  No Known Allergies    Medications:   Prescriptions Prior to Admission   Medication Sig Dispense Refill Last Dose    aspirin (ECOTRIN) 81 MG EC tablet Take 81 mg by mouth once daily.   12/3/2017 at Unknown time    eszopiclone 3 mg Tab take 1 tablet by mouth every evening 30 tablet 4 12/3/2017 at Unknown time     omega-3 fatty acids (FISH OIL) 300 mg Cap Take 300 mg by mouth.   12/3/2017 at Unknown time    rosuvastatin (CRESTOR) 20 MG tablet Take 1 tablet (20 mg total) by mouth once daily. 90 tablet 3 12/3/2017 at Unknown time    desonide (DESOWEN) 0.05 % cream Apply 1 application topically Daily.   More than a month at Unknown time    hydrocortisone 2.5 % cream apply to affected area ON FACE AND AROUND EARS twice a day if needed for inflammation and FLAKES 28 g 11 More than a month at Unknown time    ramipril (ALTACE) 5 MG capsule Take 1 capsule (5 mg total) by mouth once daily. 90 capsule 3 More than a month at Unknown time         Physical Exam:    Vital Signs: see nursing notes    General Appearance: Well appearing in no acute distress  Eyes:    No scleral icterus  ENT: Neck supple, Lips, mucosa, and tongue normal; teeth and gums normal  Lungs: CTA bilaterally  Heart:  S1, S2 normal, no murmurs heard  Abdomen: Soft, non tender, non distended with positive bowel sounds. No hepatosplenomegaly, ascites, or mass.  Extremities: 2+ pulses, no clubbing, cyanosis or edema  Skin: No rash      Labs:  Lab Results   Component Value Date    WBC 5.60 09/20/2017    HGB 14.2 09/20/2017    HCT 40.8 09/20/2017     09/20/2017    CHOL 148 09/20/2017    TRIG 50 09/20/2017    HDL 65 09/20/2017    ALT 16 09/20/2017    AST 18 09/20/2017     09/20/2017    K 4.4 09/20/2017     09/20/2017    CREATININE 0.9 09/20/2017    BUN 19 09/20/2017    CO2 26 09/20/2017    TSH 1.064 02/25/2013    PSA 1.2 09/20/2017    INR 1.0 06/01/2010    HGBA1C 5.8 (H) 09/20/2017       I have explained the risks and benefits of endoscopy procedures to the patient including but not limited to bleeding, perforation, infection, and death.      Kenneth Spicer MD

## 2017-12-04 NOTE — PATIENT INSTRUCTIONS
Discharge Summary/Instructions after an Endoscopic Procedure  Patient Name: Gurjit Valentin  Patient MRN: [id number]  Patient YOB: 1940 Monday, December 04, 2017  Kenneth Spicer MD  RESTRICTIONS:  During your procedure today, you received medications for sedation.  These   medications may affect your judgment, balance and coordination.  Therefore,   for 24 hours, you have the following restrictions:   - DO NOT drive a car, operate machinery, make legal/financial decisions,   sign important papers or drink alcohol.    ACTIVITY:  The following day: return to full activity including work, except no heavy   lifting, straining or running for 3 days if polyps were removed.  DIET:  Eat and drink normally unless instructed otherwise.     TREATMENT FOR COMMON SIDE EFFECTS:  - Mild abdominal pain, belching, bloating or excessive gas: rest, eat   lightly and use a heating pad.  - Sore Throat: treat with throat lozenges and/or gargle with warm salt   water.  SYMPTOMS TO WATCH FOR AND REPORT TO YOUR PHYSICIAN:  1. Abdominal pain or bloating, other than gas cramps.  2. Chest pain.  3. Back pain.  4. Chills or fever occurring within 24 hours after the procedure.  5. Rectal bleeding, which would show as bright red, maroon, or black stools.   (A tablespoon of blood from the rectum is not serious, especially if   hemorrhoids are present.)  6. Vomiting.  7. Weakness or dizziness.  8. Because air was used during the procedure, expelling large amounts of air   from your rectum or belching is normal.  9. If a bowel prep was taken, you may not have a bowel movement for 1-3   days.  This is normal.  GO DIRECTLY TO THE NEAREST EMERGENCY ROOM IF YOU HAVE ANY OF THE FOLLOWING:      Difficulty breathing  Chills and/or fever over 101 F   Persistent vomiting and/or vomiting blood   Severe abdominal pain   Severe chest pain   Black, tarry stools   Bleeding- more than one tablespoon   Any other symptom or condition that you may feel needs  urgent attention  Your doctor recommends these additional instructions:  If any biopsies were taken, your doctor s clinic will contact you in 1 to 2   weeks with any results.  You have a contact number available for emergencies.  The signs and symptoms   of potential delayed complications were discussed with you.  You may return   to normal activities tomorrow.  Written discharge instructions were   provided to you.   You are being discharged to home.   We are waiting for your pathology results.   Your physician has indicated that a repeat colonoscopy is not recommended   for surveillance.   The findings and recommendations were discussed with your designated   responsible adult.  For questions, problems or results please call your physician - Kenneth Spicer MD at Work:  [phone].  [facility], EMERGENCY ROOM PHONE NUMBER: [phone]  IF A COMPLICATION OR EMERGENCY SITUATION ARISES AND YOU ARE UNABLE TO REACH   YOUR PHYSICIAN - GO DIRECTLY TO THE EMERGENCY ROOM.  Kenneth Spicer MD  12/4/2017 8:09:58 AM  This report has been verified and signed electronically.

## 2017-12-11 ENCOUNTER — TELEPHONE (OUTPATIENT)
Dept: ENDOSCOPY | Facility: HOSPITAL | Age: 77
End: 2017-12-11

## 2017-12-18 ENCOUNTER — TELEPHONE (OUTPATIENT)
Dept: INFECTIOUS DISEASES | Facility: CLINIC | Age: 77
End: 2017-12-18

## 2017-12-18 RX ORDER — OSELTAMIVIR PHOSPHATE 75 MG/1
75 CAPSULE ORAL 2 TIMES DAILY
Qty: 10 CAPSULE | Refills: 0 | Status: SHIPPED | OUTPATIENT
Start: 2017-12-18 | End: 2017-12-23

## 2017-12-19 ENCOUNTER — OFFICE VISIT (OUTPATIENT)
Dept: INFECTIOUS DISEASES | Facility: CLINIC | Age: 77
End: 2017-12-19
Payer: MEDICARE

## 2017-12-19 ENCOUNTER — HOSPITAL ENCOUNTER (OUTPATIENT)
Dept: RADIOLOGY | Facility: HOSPITAL | Age: 77
Discharge: HOME | End: 2017-12-19
Payer: MEDICARE

## 2017-12-19 VITALS
BODY MASS INDEX: 29.87 KG/M2 | HEIGHT: 71 IN | TEMPERATURE: 99 F | HEART RATE: 58 BPM | WEIGHT: 213.38 LBS | SYSTOLIC BLOOD PRESSURE: 116 MMHG | DIASTOLIC BLOOD PRESSURE: 66 MMHG

## 2017-12-19 DIAGNOSIS — S09.93XD FACIAL INJURY, SUBSEQUENT ENCOUNTER: ICD-10-CM

## 2017-12-19 DIAGNOSIS — R50.9 FEVER AND CHILLS: ICD-10-CM

## 2017-12-19 DIAGNOSIS — J06.9 VIRAL UPPER RESPIRATORY TRACT INFECTION: ICD-10-CM

## 2017-12-19 DIAGNOSIS — R50.9 FEVER AND CHILLS: Primary | ICD-10-CM

## 2017-12-19 PROCEDURE — 71020 XR CHEST PA AND LATERAL: CPT | Mod: 26,,, | Performed by: RADIOLOGY

## 2017-12-19 PROCEDURE — 99214 OFFICE O/P EST MOD 30 MIN: CPT | Mod: S$PBB,,, | Performed by: INTERNAL MEDICINE

## 2017-12-19 PROCEDURE — 99213 OFFICE O/P EST LOW 20 MIN: CPT | Mod: PBBFAC,25 | Performed by: INTERNAL MEDICINE

## 2017-12-19 PROCEDURE — 99999 PR PBB SHADOW E&M-EST. PATIENT-LVL III: CPT | Mod: PBBFAC,,, | Performed by: INTERNAL MEDICINE

## 2017-12-19 PROCEDURE — 71020 XR CHEST PA AND LATERAL: CPT | Mod: TC

## 2017-12-19 NOTE — PROGRESS NOTES
Subjective:      Patient ID: Gurjit Valentin is a 77 y.o. male.    Chief Complaint:Follow-up      History of Present Illness    Was in [facility] on vacation last week when he fell in dark hotel room and hit his right periorbital area on corner of piece of furniture. Seen in ER at [facility] on 12/13 where was found to have laceration of right medial orbit which was sutured with dissolving sutures. A CT of the brain was normal. CT of face showed a small mildly displaced fx of the nasal bony tip. He was told that this does not need to be fixed unless there was a bothersome cosmetic deformity.    In addition to this he had developed fever to 101-102 several days before the fall and cough/chest congestion. He was seen at [facility] urgent care clinic on 12/11 where he was given azithromycin z pack which he started on 12/14 and is currently finishing. His fever got better for a few days but he is still having fever, last night to 101 and has a productive cough. His facial bruising is improving and he doesn't have much facial pain. I called in a rx for tamiflu for him on 12/18 and he is still on a 5 day course of this.    Review of Systems   Constitution: Positive for fever and weakness. Negative for chills, decreased appetite, malaise/fatigue, night sweats, weight gain and weight loss.   HENT: Positive for congestion. Negative for ear pain, hearing loss, hoarse voice, sore throat and tinnitus.    Eyes: Positive for redness. Negative for blurred vision and visual disturbance.   Cardiovascular: Negative for chest pain, leg swelling and palpitations.   Respiratory: Positive for cough and wheezing. Negative for hemoptysis, shortness of breath and sputum production.    Hematologic/Lymphatic: Negative for adenopathy. Does not bruise/bleed easily.   Skin: Negative for dry skin, itching, rash and suspicious lesions.   Musculoskeletal: Negative for back pain, joint pain, myalgias and neck pain.   Gastrointestinal: Negative for abdominal  pain, constipation, diarrhea, heartburn, nausea and vomiting.   Genitourinary: Negative for dysuria, flank pain, frequency, hematuria, hesitancy and urgency.   Neurological: Positive for headaches. Negative for dizziness, numbness and paresthesias.   Psychiatric/Behavioral: Negative for depression and memory loss. The patient does not have insomnia and is not nervous/anxious.      Objective:   Physical Exam   Constitutional: He is oriented to person, place, and time. He appears well-developed and well-nourished.   HENT:   Facial bruising about right orbit and nose.   Cardiovascular: Normal rate, regular rhythm and normal heart sounds.  Exam reveals no gallop and no friction rub.    No murmur heard.  Pulmonary/Chest: Effort normal and breath sounds normal. No respiratory distress. He has no wheezes. He has no rales.   Neurological: He is alert and oriented to person, place, and time.   Skin: Skin is warm and dry.   Psychiatric: He has a normal mood and affect. His behavior is normal. Thought content normal.   Vitals reviewed.    Assessment:       1. Fever and chills    2. Viral upper respiratory tract infection    3. Facial injury, subsequent encounter          Plan:        1. Complete tamiflu and azithromycin   2. CXR today with Jose review   3. Probably no need to see ENT at moment (no visible nasal deformity)

## 2017-12-28 ENCOUNTER — PROCEDURE VISIT (OUTPATIENT)
Dept: UROLOGY | Facility: CLINIC | Age: 77
End: 2017-12-28
Payer: MEDICARE

## 2017-12-28 VITALS
WEIGHT: 213.19 LBS | SYSTOLIC BLOOD PRESSURE: 133 MMHG | HEART RATE: 70 BPM | TEMPERATURE: 98 F | RESPIRATION RATE: 18 BRPM | BODY MASS INDEX: 29.85 KG/M2 | DIASTOLIC BLOOD PRESSURE: 71 MMHG | HEIGHT: 71 IN

## 2017-12-28 DIAGNOSIS — C67.2 MALIGNANT NEOPLASM OF LATERAL WALL OF URINARY BLADDER: Primary | ICD-10-CM

## 2017-12-28 PROCEDURE — 52000 CYSTOURETHROSCOPY: CPT | Mod: PBBFAC | Performed by: UROLOGY

## 2017-12-28 RX ORDER — LIDOCAINE HYDROCHLORIDE 20 MG/ML
JELLY TOPICAL ONCE
Status: COMPLETED | OUTPATIENT
Start: 2017-12-28 | End: 2017-12-28

## 2017-12-28 RX ADMIN — LIDOCAINE HYDROCHLORIDE: 20 JELLY TOPICAL at 01:12

## 2017-12-28 NOTE — PROCEDURES
Cystoscopy  Date/Time: 12/28/2017 1:31 PM  Performed by: ROJAS WALTER  Authorized by: ROJAS WALTER     Consent Done?:  Yes (Written)  Indications: history bladder cancer    Position:  Supine  Anesthesia:  Intraurethral instillation  Patient sedated?: No    Preparation: Patient was prepped and draped in usual sterile fashion      Scope type:  Flexible cystoscope  Stent inserted: No    Stent removed: No    External exam normal: Yes    Digital exam performed: No    Urethra normal: Yes    Prostate normal: No          Hyperplasia (Trilobar)(Length (cm):  5)Bladder neck normal: Bladder neck normal   Bladder normal: Yes      Patient tolerance:  Patient tolerated the procedure well with no immediate complications     1 year with cystoscopy

## 2017-12-28 NOTE — PATIENT INSTRUCTIONS
Cystoscopy    Cystoscopy is a procedure that lets your doctor look directly inside your urethra and bladder. It can be used to:  · Help diagnose a problem with your urethra, bladder, or kidneys.  · Take a sample (biopsy) of bladder or urethral tissue.  · Treat certain problems (such as removing kidney stones).  · Place a stent to bypass an obstruction.  · Take special X-rays of the kidneys.  Based on the findings, your doctor may recommend other tests or treatments.  What is a cystoscope?  A cystoscope is a telescope-like instrument that contains lenses and fiberoptics (small glass wires that make bright light). The cystoscope may be straight and rigid, or flexible to bend around curves in the urethra. The doctor may look directly into the cystoscope, or project the image onto a monitor.  Getting ready  · Ask your doctor if you should stop taking any medicines before the procedure.  · Ask whether you should avoid eating or drinking anything after midnight before the procedure.  · Follow any other instructions your doctor gives you.  Tell your doctor before the exam if you:  · Take any medicines, such as aspirin or blood thinners  · Have allergies to any medicines  · Are pregnant   The procedure  Cystoscopy is done in the doctors office, surgery center, or hospital. The doctor and a nurse are present during the procedure. It takes only a few minutes, longer if a biopsy, X-ray, or treatment needs to be done.  During the procedure:  · You lie on an exam table on your back, knees bent and legs apart. You are covered with a drape.  · Your urethra and the area around it are washed. Anesthetic jelly may be applied to numb the urethra. Other pain medicine is usually not needed. In some cases, you may be offered a mild sedative to help you relax. If a more extensive procedure is to be done, such as a biopsy or kidney stone removal, general anesthesia may be needed.  · The cystoscope is inserted. A sterile fluid is put  into the bladder to expand it. You may feel pressure from this fluid.  · When the procedure is done, the cystoscope is removed.  After the procedure  If you had a sedative, general anesthesia, or spinal anesthesia, you must have someone drive you home. Once youre home:  · Drink plenty of fluids.  · You may have burning or light bleeding when you urinate--this is normal.  · Medicines may be prescribed to ease any discomfort or prevent infection. Take these as directed.  · Call your doctor if you have heavy bleeding or blood clots, burning that lasts more than a day, a fever over 100°F  (38° C), or trouble urinating.  Date Last Reviewed: 1/1/2017 © 2000-2017 [employer]. [address] [place] [address], [place] [address]. All rights reserved. This information is not intended as a substitute for professional medical care. Always follow your healthcare professional's instructions.      What to Expect After a Cystoscopy  For the next 24-48 hours, you may feel a mild burning when you urinate. This burning is normal and expected. Drink plenty of water to dilute the urine to help relieve the burning sensation. You may also see a small amount of blood in your urine after the procedure.    Unless you are already taking antibiotics, you may be given an antibiotic after the test to prevent infection.    Signs and Symptoms to Report  Call the [facility] at [phone] if you develop any of the following:  · Fever of 101 degrees or higher  · Chills or persistent bleeding  · Inability to urinate

## 2018-02-12 DIAGNOSIS — G47.00 INSOMNIA, UNSPECIFIED TYPE: ICD-10-CM

## 2018-02-12 RX ORDER — ESZOPICLONE 3 MG/1
3 TABLET, FILM COATED ORAL NIGHTLY
Qty: 30 TABLET | Refills: 4 | Status: SHIPPED | OUTPATIENT
Start: 2018-02-12 | End: 2018-02-14 | Stop reason: SDUPTHER

## 2018-02-12 NOTE — TELEPHONE ENCOUNTER
----- Message from Radha Kanwal sent at 2/12/2018  9:52 AM CST -----  Contact: Gurjit   [phone]  Dr. Johnson's pt./ Pt.says he needs you to send a refill of LUNESTA to [facility] [address].     Pt.says he tried to do it on the Pt. Portal but it would not allow him to do any refills on this.   Is that because it is out of refills?    Pls call when this has been sent to the pharmacy.

## 2018-02-14 RX ORDER — ESZOPICLONE 3 MG/1
TABLET, FILM COATED ORAL
Qty: 30 TABLET | Refills: 2 | Status: SHIPPED | OUTPATIENT
Start: 2018-02-14 | End: 2018-09-06 | Stop reason: SDUPTHER

## 2018-06-13 ENCOUNTER — TELEPHONE (OUTPATIENT)
Dept: INFECTIOUS DISEASES | Facility: CLINIC | Age: 78
End: 2018-06-13

## 2018-06-13 DIAGNOSIS — E78.5 HYPERLIPIDEMIA, UNSPECIFIED HYPERLIPIDEMIA TYPE: ICD-10-CM

## 2018-06-13 RX ORDER — ROSUVASTATIN CALCIUM 20 MG/1
20 TABLET, COATED ORAL DAILY
Qty: 90 TABLET | Refills: 3 | Status: SHIPPED | OUTPATIENT
Start: 2018-06-13 | End: 2018-06-14 | Stop reason: SDUPTHER

## 2018-06-14 ENCOUNTER — TELEPHONE (OUTPATIENT)
Dept: INFECTIOUS DISEASES | Facility: CLINIC | Age: 78
End: 2018-06-14

## 2018-06-14 DIAGNOSIS — E78.5 HYPERLIPIDEMIA, UNSPECIFIED HYPERLIPIDEMIA TYPE: ICD-10-CM

## 2018-06-14 RX ORDER — ROSUVASTATIN CALCIUM 20 MG/1
20 TABLET, COATED ORAL DAILY
Qty: 90 TABLET | Refills: 3 | Status: SHIPPED | OUTPATIENT
Start: 2018-06-14 | End: 2019-06-20 | Stop reason: SDUPTHER

## 2018-06-14 RX ORDER — ROSUVASTATIN CALCIUM 20 MG/1
20 TABLET, COATED ORAL NIGHTLY
Qty: 15 TABLET | Refills: 0 | Status: SHIPPED | OUTPATIENT
Start: 2018-06-14 | End: 2018-07-06 | Stop reason: SDUPTHER

## 2018-06-29 ENCOUNTER — HOSPITAL ENCOUNTER (OUTPATIENT)
Dept: CARDIOLOGY | Facility: CLINIC | Age: 78
Discharge: HOME | End: 2018-06-29
Attending: INTERNAL MEDICINE
Payer: MEDICARE

## 2018-06-29 DIAGNOSIS — I25.10 CORONARY ARTERY DISEASE, ANGINA PRESENCE UNSPECIFIED, UNSPECIFIED VESSEL OR LESION TYPE, UNSPECIFIED WHETHER NATIVE OR TRANSPLANTED HEART: ICD-10-CM

## 2018-06-29 LAB
DIASTOLIC DYSFUNCTION: NO
RETIRED EF AND QEF - SEE NOTES: 60 (ref 55–65)

## 2018-06-29 PROCEDURE — 93321 DOPPLER ECHO F-UP/LMTD STD: CPT | Mod: 26,S$PBB,, | Performed by: INTERNAL MEDICINE

## 2018-06-29 PROCEDURE — 93351 STRESS TTE COMPLETE: CPT | Mod: PBBFAC | Performed by: INTERNAL MEDICINE

## 2018-07-03 DIAGNOSIS — I25.10 CORONARY ARTERY DISEASE INVOLVING NATIVE CORONARY ARTERY WITHOUT ANGINA PECTORIS, UNSPECIFIED WHETHER NATIVE OR TRANSPLANTED HEART: ICD-10-CM

## 2018-07-03 RX ORDER — RAMIPRIL 5 MG/1
5 CAPSULE ORAL DAILY
Qty: 90 CAPSULE | Refills: 3 | Status: SHIPPED | OUTPATIENT
Start: 2018-07-03 | End: 2019-10-18 | Stop reason: SDUPTHER

## 2018-07-06 ENCOUNTER — OFFICE VISIT (OUTPATIENT)
Dept: CARDIOLOGY | Facility: CLINIC | Age: 78
End: 2018-07-06
Payer: MEDICARE

## 2018-07-06 VITALS
SYSTOLIC BLOOD PRESSURE: 117 MMHG | WEIGHT: 222.44 LBS | OXYGEN SATURATION: 95 % | DIASTOLIC BLOOD PRESSURE: 81 MMHG | HEART RATE: 52 BPM | HEIGHT: 71 IN | BODY MASS INDEX: 31.14 KG/M2

## 2018-07-06 DIAGNOSIS — E78.00 PURE HYPERCHOLESTEROLEMIA: ICD-10-CM

## 2018-07-06 DIAGNOSIS — I25.10 CORONARY ARTERY DISEASE INVOLVING NATIVE CORONARY ARTERY OF NATIVE HEART WITHOUT ANGINA PECTORIS: Primary | ICD-10-CM

## 2018-07-06 PROCEDURE — 99213 OFFICE O/P EST LOW 20 MIN: CPT | Mod: S$PBB,,, | Performed by: INTERNAL MEDICINE

## 2018-07-06 PROCEDURE — 99999 PR PBB SHADOW E&M-EST. PATIENT-LVL III: CPT | Mod: PBBFAC,,, | Performed by: INTERNAL MEDICINE

## 2018-07-06 PROCEDURE — 99213 OFFICE O/P EST LOW 20 MIN: CPT | Mod: PBBFAC | Performed by: INTERNAL MEDICINE

## 2018-07-06 RX ORDER — LORATADINE 10 MG/1
10 TABLET ORAL DAILY PRN
COMMUNITY
End: 2022-11-08

## 2018-07-06 NOTE — PROGRESS NOTES
Subjective:   Patient ID:  Gurjit Valentin is a 77 y.o. male who presents for follow up of Coronary Artery Disease and Hyperlipidemia      Assessment:     1. Coronary artery disease involving native coronary artery of native heart without angina pectoris : negative stress test   2. Pure hypercholesterolemia : at goal       Plan:     1. Cont meds.  2. RTC 1 yr with stress echo, flp, and bmp.        HPI: Doing really well. Good activity and lifestyle. Semi-retired from [employer].  Recently wrote a book.     Review of Systems   Cardiovascular: Negative for chest pain, claudication, cyanosis, dyspnea on exertion, irregular heartbeat, leg swelling, near-syncope, orthopnea, palpitations, paroxysmal nocturnal dyspnea and syncope.   Respiratory: Negative for shortness of breath.    Neurological: Negative for brief paralysis, dizziness and focal weakness.       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    Sleep apnea        Past Surgical History:   Procedure Laterality Date    BLADDER SURGERY      CARDIAC CATHETERIZATION      COLONOSCOPY N/A 12/4/2017    Procedure: COLONOSCOPY;  Surgeon: Kenneth Spicer MD;  Location: [address];  Service: Endoscopy;  Laterality: N/A;    CYSTOSCOPY      EYE SURGERY      bilateral    KIDNEY STONE SURGERY      LITHOTRIPSY      TONSILLECTOMY      at age 4       Social History   Substance Use Topics    Smoking status: Former Smoker     Packs/day: 0.25     Years: 20.00     Quit date: 1/1/1988    Smokeless tobacco: Never Used      Comment: STOPPED 30 YRS AGO.    Alcohol use 5.9 oz/week     9 Glasses of wine, 1 Standard drinks or equivalent per week      Comment: weekly       Family History   Problem Relation Age of Onset     "Hypertension Mother     Hypertension Brother     Heart attack Brother     No Known Problems Father     No Known Problems Maternal Grandmother     No Known Problems Maternal Grandfather     No Known Problems Paternal Grandmother     No Known Problems Paternal Grandfather     No Known Problems Sister     No Known Problems Maternal Aunt     No Known Problems Maternal Uncle     No Known Problems Paternal Aunt     No Known Problems Paternal Uncle     Colon polyps Neg Hx     Cancer Neg Hx     Heart disease Neg Hx     Anemia Neg Hx     Arrhythmia Neg Hx     Asthma Neg Hx     Clotting disorder Neg Hx     Fainting Neg Hx     Heart failure Neg Hx     Hyperlipidemia Neg Hx        Current Outpatient Prescriptions   Medication Sig    aspirin (ECOTRIN) 81 MG EC tablet Take 81 mg by mouth once daily.    eszopiclone (LUNESTA) 3 mg Tab take 1 tablet by mouth every evening    loratadine (CLARITIN) 10 mg tablet Take 10 mg by mouth once daily.    omega-3 fatty acids (FISH OIL) 300 mg Cap Take 300 mg by mouth.    ramipril (ALTACE) 5 MG capsule Take 1 capsule (5 mg total) by mouth once daily.    rosuvastatin (CRESTOR) 20 MG tablet Take 1 tablet (20 mg total) by mouth once daily.    desonide (DESOWEN) 0.05 % cream Apply 1 application topically Daily.     No current facility-administered medications for this visit.        Review of patient's allergies indicates:  No Known Allergies    Objective:     /81 (BP Location: Right arm, Patient Position: Sitting, BP Method: Large (Automatic))   Pulse (!) 52   Ht 5' 11" (1.803 m)   Wt 100.9 kg (222 lb 7.1 oz)   SpO2 95%   BMI 31.02 kg/m²     Physical Exam   Constitutional: He is oriented to person, place, and time. He appears well-developed and well-nourished.   HENT:   Head: Normocephalic and atraumatic.   Eyes: Conjunctivae and EOM are normal. Pupils are equal, round, and reactive to light.   Neck: Normal range of motion. Neck supple. No thyromegaly present. "   Cardiovascular: Normal rate, regular rhythm and normal heart sounds.  Exam reveals no gallop and no friction rub.    No murmur heard.  Pulmonary/Chest: Effort normal and breath sounds normal. No respiratory distress. He has no wheezes. He has no rales. He exhibits no tenderness.   Abdominal: Soft. Bowel sounds are normal. He exhibits no distension. There is no tenderness.   Musculoskeletal: Normal range of motion.   Neurological: He is alert and oriented to person, place, and time. He has normal reflexes. No cranial nerve deficit. Coordination normal.   Skin: Skin is warm and dry.   Psychiatric: He has a normal mood and affect. His behavior is normal. Judgment and thought content normal.   Nursing note and vitals reviewed.        Chemistry        Component Value Date/Time     09/20/2017 0846    K 4.4 09/20/2017 0846     09/20/2017 0846    CO2 26 09/20/2017 0846    BUN 19 09/20/2017 0846    CREATININE 0.9 09/20/2017 0846     (H) 09/20/2017 0846        Component Value Date/Time    CALCIUM 9.2 09/20/2017 0846    ALKPHOS 38 (L) 09/20/2017 0846    AST 18 09/20/2017 0846    ALT 16 09/20/2017 0846    BILITOT 0.4 09/20/2017 0846    ESTGFRAFRICA >60.0 09/20/2017 0846    EGFRNONAA >60.0 09/20/2017 0846            Lab Results   Component Value Date    CHOL 143 06/29/2018    CHOL 148 09/20/2017    CHOL 144 07/05/2017     Lab Results   Component Value Date    HDL 63 06/29/2018    HDL 65 09/20/2017    HDL 60 07/05/2017     Lab Results   Component Value Date    LDLCALC 66.6 06/29/2018    LDLCALC 73.0 09/20/2017    LDLCALC 69.4 07/05/2017     Lab Results   Component Value Date    TRIG 67 06/29/2018    TRIG 50 09/20/2017    TRIG 73 07/05/2017     Lab Results   Component Value Date    CHOLHDL 44.1 06/29/2018    CHOLHDL 43.9 09/20/2017    CHOLHDL 41.7 07/05/2017         Lab Results   Component Value Date     09/20/2017    K 4.4 09/20/2017     09/20/2017    CO2 26 09/20/2017    BUN 19 09/20/2017     CREATININE 0.9 09/20/2017     (H) 09/20/2017    HGBA1C 5.8 (H) 09/20/2017    AST 18 09/20/2017    ALT 16 09/20/2017    ALBUMIN 3.6 09/20/2017    PROT 6.8 09/20/2017    BILITOT 0.4 09/20/2017    WBC 5.60 09/20/2017    HGB 14.2 09/20/2017    HCT 40.8 09/20/2017    MCV 89 09/20/2017     09/20/2017    INR 1.0 06/01/2010    PSA 1.2 09/20/2017    TSH 1.064 02/25/2013    CHOL 143 06/29/2018    HDL 63 06/29/2018    LDLCALC 66.6 06/29/2018    TRIG 67 06/29/2018

## 2018-09-06 DIAGNOSIS — G47.00 INSOMNIA, UNSPECIFIED TYPE: ICD-10-CM

## 2018-09-06 RX ORDER — ESZOPICLONE 3 MG/1
TABLET, FILM COATED ORAL
Qty: 30 TABLET | Refills: 3 | Status: SHIPPED | OUTPATIENT
Start: 2018-09-06 | End: 2019-01-04 | Stop reason: SDUPTHER

## 2018-10-04 ENCOUNTER — APPOINTMENT (RX ONLY)
Dept: URBAN - METROPOLITAN AREA CLINIC 98 | Facility: CLINIC | Age: 78
Setting detail: DERMATOLOGY
End: 2018-10-04

## 2018-10-04 DIAGNOSIS — L57.0 ACTINIC KERATOSIS: ICD-10-CM

## 2018-10-04 DIAGNOSIS — L82.1 OTHER SEBORRHEIC KERATOSIS: ICD-10-CM

## 2018-10-04 DIAGNOSIS — L81.4 OTHER MELANIN HYPERPIGMENTATION: ICD-10-CM

## 2018-10-04 DIAGNOSIS — D485 NEOPLASM OF UNCERTAIN BEHAVIOR OF SKIN: ICD-10-CM

## 2018-10-04 DIAGNOSIS — L40.0 PSORIASIS VULGARIS: ICD-10-CM

## 2018-10-04 DIAGNOSIS — Z85.828 PERSONAL HISTORY OF OTHER MALIGNANT NEOPLASM OF SKIN: ICD-10-CM

## 2018-10-04 DIAGNOSIS — L72.0 EPIDERMAL CYST: ICD-10-CM

## 2018-10-04 DIAGNOSIS — B07.8 OTHER VIRAL WARTS: ICD-10-CM

## 2018-10-04 PROBLEM — D48.5 NEOPLASM OF UNCERTAIN BEHAVIOR OF SKIN: Status: ACTIVE | Noted: 2018-10-04

## 2018-10-04 PROCEDURE — ? LIQUID NITROGEN

## 2018-10-04 PROCEDURE — 11100: CPT | Mod: 59

## 2018-10-04 PROCEDURE — ? COUNSELING

## 2018-10-04 PROCEDURE — ? PRESCRIPTION

## 2018-10-04 PROCEDURE — 11101: CPT

## 2018-10-04 PROCEDURE — ? TREATMENT REGIMEN

## 2018-10-04 PROCEDURE — ? BIOPSY BY SHAVE METHOD

## 2018-10-04 PROCEDURE — 17110 DESTRUCTION B9 LES UP TO 14: CPT

## 2018-10-04 PROCEDURE — 99214 OFFICE O/P EST MOD 30 MIN: CPT | Mod: 25

## 2018-10-04 PROCEDURE — ? ACNE SURGERY

## 2018-10-04 PROCEDURE — ? OBSERVATION

## 2018-10-04 RX ORDER — BETAMETHASONE DIPROPIONATE 0.5 MG/G
OINTMENT TOPICAL
Qty: 1 | Refills: 3 | Status: ERX | COMMUNITY
Start: 2018-10-04

## 2018-10-04 RX ORDER — CALCIPOTRIENE 50 UG/G
AEROSOL, FOAM TOPICAL
Qty: 1 | Refills: 3 | Status: ERX | COMMUNITY
Start: 2018-10-04

## 2018-10-04 RX ORDER — FLUOROURACIL 50 MG/G
CREAM TOPICAL BID
Qty: 1 | Refills: 2 | Status: ERX | COMMUNITY
Start: 2018-10-04

## 2018-10-04 RX ADMIN — BETAMETHASONE DIPROPIONATE: 0.5 OINTMENT TOPICAL at 16:56

## 2018-10-04 RX ADMIN — FLUOROURACIL: 50 CREAM TOPICAL at 16:40

## 2018-10-04 RX ADMIN — CALCIPOTRIENE: 50 AEROSOL, FOAM TOPICAL at 16:41

## 2018-10-04 ASSESSMENT — LOCATION DETAILED DESCRIPTION DERM
LOCATION DETAILED: RIGHT MEDIAL FRONTAL SCALP
LOCATION DETAILED: RIGHT INFERIOR UPPER BACK
LOCATION DETAILED: LEFT RADIAL DORSAL HAND
LOCATION DETAILED: LEFT SUPERIOR LATERAL UPPER BACK
LOCATION DETAILED: RIGHT CENTRAL FRONTAL SCALP
LOCATION DETAILED: LEFT SUPERIOR MEDIAL UPPER BACK
LOCATION DETAILED: LEFT FOREHEAD
LOCATION DETAILED: RIGHT MEDIAL SUPERIOR CHEST
LOCATION DETAILED: LEFT CENTRAL FRONTAL SCALP
LOCATION DETAILED: RIGHT PROXIMAL PRETIBIAL REGION
LOCATION DETAILED: LEFT DISTAL PRETIBIAL REGION
LOCATION DETAILED: LEFT ANTERIOR DISTAL THIGH
LOCATION DETAILED: RIGHT POSTERIOR SHOULDER
LOCATION DETAILED: RIGHT INFERIOR CENTRAL MALAR CHEEK
LOCATION DETAILED: RIGHT ANTERIOR DISTAL THIGH
LOCATION DETAILED: LEFT DISTAL CALF
LOCATION DETAILED: LEFT ANTERIOR SHOULDER
LOCATION DETAILED: EPIGASTRIC SKIN
LOCATION DETAILED: RIGHT ULNAR DORSAL HAND
LOCATION DETAILED: LEFT CLAVICULAR SKIN
LOCATION DETAILED: LEFT ULNAR DORSAL HAND
LOCATION DETAILED: LEFT ANTERIOR PROXIMAL THIGH
LOCATION DETAILED: LEFT PROXIMAL POSTERIOR UPPER ARM
LOCATION DETAILED: RIGHT CLAVICULAR NECK
LOCATION DETAILED: RIGHT RADIAL DORSAL HAND
LOCATION DETAILED: LEFT POSTERIOR SHOULDER
LOCATION DETAILED: NASAL TIP
LOCATION DETAILED: LEFT SUPERIOR MEDIAL MIDBACK
LOCATION DETAILED: RIGHT SUPERIOR FOREHEAD
LOCATION DETAILED: RIGHT PROXIMAL POSTERIOR UPPER ARM
LOCATION DETAILED: PERIUMBILICAL SKIN

## 2018-10-04 ASSESSMENT — LOCATION SIMPLE DESCRIPTION DERM
LOCATION SIMPLE: LEFT THIGH
LOCATION SIMPLE: RIGHT ANTERIOR NECK
LOCATION SIMPLE: LEFT UPPER BACK
LOCATION SIMPLE: LEFT CALF
LOCATION SIMPLE: RIGHT FOREHEAD
LOCATION SIMPLE: CHEST
LOCATION SIMPLE: RIGHT UPPER ARM
LOCATION SIMPLE: RIGHT CHEEK
LOCATION SIMPLE: LEFT FOREHEAD
LOCATION SIMPLE: RIGHT UPPER BACK
LOCATION SIMPLE: LEFT SHOULDER
LOCATION SIMPLE: LEFT SCALP
LOCATION SIMPLE: LEFT HAND
LOCATION SIMPLE: NOSE
LOCATION SIMPLE: LEFT CLAVICULAR SKIN
LOCATION SIMPLE: ABDOMEN
LOCATION SIMPLE: LEFT PRETIBIAL REGION
LOCATION SIMPLE: RIGHT PRETIBIAL REGION
LOCATION SIMPLE: RIGHT THIGH
LOCATION SIMPLE: RIGHT SHOULDER
LOCATION SIMPLE: LEFT LOWER BACK
LOCATION SIMPLE: LEFT UPPER ARM
LOCATION SIMPLE: RIGHT SCALP
LOCATION SIMPLE: RIGHT HAND

## 2018-10-04 ASSESSMENT — LOCATION ZONE DERM
LOCATION ZONE: TRUNK
LOCATION ZONE: FACE
LOCATION ZONE: SCALP
LOCATION ZONE: HAND
LOCATION ZONE: LEG
LOCATION ZONE: ARM
LOCATION ZONE: NOSE
LOCATION ZONE: NECK

## 2018-10-04 ASSESSMENT — PAIN INTENSITY VAS
HOW INTENSE IS YOUR PAIN 0 BEING NO PAIN, 10 BEING THE MOST SEVERE PAIN POSSIBLE?: NO PAIN
HOW INTENSE IS YOUR PAIN 0 BEING NO PAIN, 10 BEING THE MOST SEVERE PAIN POSSIBLE?: 1/10 PAIN

## 2018-10-04 NOTE — PROCEDURE: LIQUID NITROGEN
Duration Of Freeze Thaw-Cycle (Seconds): 5-10
Medical Necessity Clause: This procedure was medically necessary because the lesions that were treated were:
Medical Necessity Information: It is in your best interest to select a reason for this procedure from the list below. All of these items fulfill various CMS LCD requirements except the new and changing color options.
Number Of Freeze-Thaw Cycles: 2 freeze-thaw cycles
Add 52 Modifier (Optional): no
Post-Care Instructions: LIQUID NITROGEN TREATMENT Patient Information\\nLiquid Nitrogen is a very cold, liquefied gas with a temperature of 320 degrees below zero. It is used to freeze and destroy a variety of skin lesions such as keratoses and warts. It burns when applied and sometimes for several minutes thereafter. There are certain expectations that you should have following treatment:\\n1. The skin tends to become red and swollen within hours of treatment. In many patients, true blisters occur and are especially common around the fingers and eyelids. A scab then forms which will sometimes remain for 1 ­3 weeks and drop off. The lesion treated will often drop off at that point as well.\\n2. If you get a large or tender blister, you may gently prick the blister with a \"sterilized\" (i.e. soaked in alcohol) needle and allow the blister fluid to drain, but leave the blister roof intact. If the blister isn't bothering you, then it's best to leave it alone.\\n3. Clean the treatment site with soap and water once or twice daily. \\n4. We recommend applying petrolatum (Vaseline, Aquaphor) several times daily to the treated areas for the next 1 to 2 weeks. This will speed up healing, decrease pain, and improve the appearance of any scar that may form. We do not recommend antibiotic ointment (Bacitracin or Polysporin) as this has no superiority to pertolatum and may cause an allergic reaction. If the treated area is in a body­fold (arm pit, groin) then zinc oxide paste (Desitin ointment, A&D ointment, Arben's Butt Paste) may be preferable. A Band­Aid is not necessary unless you find that the area is very weepy. \\n5. In general, you may participate without restriction in your daily activities including sports, swimming, and showering. We do not however recommend getting any wound in fresh or salt water.\\n6. If you have any questions, please contact our office.\\nIMPORTANT: Lesions treated with liquid nitrogen should resolve completely. If a lesion persists beyond 6 weeks we advise you to return to the clinic immediately for re­evaluation. (The  is instructed to get you in immediately.) Warts and Benign Keratoses may require multiple treatment sessions to clear the lesions. Commonly you can develop a white blemish or scar at the treatment site.
Render Post-Care Instructions In Note?: yes
Consent: The patient's consent was obtained including but not limited to risks of crusting, scabbing, blistering, scarring, darker or lighter pigmentary change, recurrence, incomplete removal and infection.
Detail Level: Detailed

## 2018-10-04 NOTE — PROCEDURE: ACNE SURGERY
Render Post-Care Instructions In Note?: no
Prep Text (Optional): Prior to removal the treatment areas were prepped in the usual fashion.
Extraction Method: comedo extractor
Consent was obtained and risks were reviewed including but not limited to scarring, infection, bleeding, scabbing, incomplete removal, and allergy to anesthesia.
Render Number Of Lesions Treated: yes
Detail Level: Detailed
Post-Care Instructions: I reviewed with the patient in detail post-care instructions. Patient is to keep the treatment areas dry overnight, and then apply bacitracin twice daily until healed. Patient may apply hydrogen peroxide soaks to remove any crusting.
Acne Type: Cysts

## 2018-10-04 NOTE — PROCEDURE: BIOPSY BY SHAVE METHOD
Biopsy Method: Dermablade
Cryotherapy Text: The wound bed was treated with cryotherapy after the biopsy was performed.
Bill 66289 For Specimen Handling/Conveyance To Laboratory?: no
Post-Care Instructions: 1) Gently wash the biopsy site with regular soap and water daily. If a scab develops, you can dilute hydrogen peroxide 1:1 with tap water and apply it to dissolve the crust.\\n2) Keep a small amount of white petrolatum (Aquaphor) and a non­stick bandage on the dressing at all times. Antibiotic ointments (Neosporin, etc) have not shown any superiority to simple petrolatum, cost more, and run the risk of a contact allergy. Keeping the wound covered has been shown to be superior to \"airing it out.\" If the bandage is irritating you, let us know and we can find a less­irritating alternative dressing together.\\n3) Notify the office if significant, persistent bleeding occurs from the biopsy site.\\n4) Do not expose the wound to fresh, salty, or brackish water until it has completely healed (after about 2 weeks). Tap or chlorinated water should be fine.\\n5) A small rim of redness and a small amount of yellow debris on the wound bed are normal. If you encounter increasing warmth, redness, pain, or liquid pus after the first day, these might indicate a localized infection and you should notify our office via phone or email.\\n6) If regular bandaids are uncomfortable or irritating, then oval hydrocolloid dressings (often sold as \"blister pads\") can be cut to fit and placed on the wound after the first 3­4 days, and changed out every 3­4 days. It is ok to wear these dressings in the shower or pool.\\n7) If stitches have been placed, you will need to return to the clinic in 1 or 2 weeks to have them professionally removed. Cutting the knots yourself may leave irritating portions of suture material under the skin.\\n8) Do not assume that \"no news is good news.\" If you have not received a call from our office within 7 days, please let us know so that we can investigate what might be holding up the biopsy report.\\n9) Wound care should continue until the biopsy site is pink, smooth, and dry with new skin, usually by 2 weeks.
Biopsy Type: H and E
Render Post-Care Instructions In Note?: yes
Anesthesia Volume In Cc (Will Not Render If 0): 0.8
Anesthesia Type: 1% lidocaine without epinephrine
Electrodesiccation Text: The wound bed was treated with electrodesiccation after the biopsy was performed.
Wound Care: Petrolatum
Electrodesiccation And Curettage Text: The wound bed was treated with electrodesiccation and curettage after the biopsy was performed.
Hemostasis: Drysol
Notification Instructions: Patient will be notified of biopsy results. However, patient instructed to call the office if not contacted within 2 weeks.
Detail Level: Simple
Curettage Text: The wound bed was treated with curettage after the biopsy was performed.
Lab: [id number]
Depth Of Biopsy: dermis
Type Of Destruction Used: Curettage
Path Notes Override (Will Replace All Of The Above Text): Please have pathologist examine this specimen prior to grossing.\\n\\nThis is the most irregular portion of larger patch
Dressing: Band-Aid
X Size Of Lesion In Cm: 0
Lab Facility: [id number]
Billing Type: Third-Party Bill
Consent: Verbal consent was obtained and risks were reviewed including but not limited to scarring, infection, bleeding, scabbing, incomplete removal, nerve damage and allergy to anesthesia.
Silver Nitrate Text: The wound bed was treated with silver nitrate after the biopsy was performed.
Lab: [id number]
Lab Facility: [id number]
Billing Type: Third-Party Bill
Post-Care Instructions: 1) Gently wash the biopsy site with regular soap and water daily. If a scab develops, you can dilute hydrogen peroxide 1:1 with tap water and apply it to dissolve the crust.\\n2) Keep a small amount of white petrolatum (Aquaphor) and a non­stick bandage on the dressing at all times. Antibiotic ointments (Neosporin, etc) have not shown any superiority to simple petrolatum, cost more, and run the risk of a contact allergy. Keeping the wound covered has been shown to be superior to \"airing it out.\" If the bandage is irritating you, let us know and we can find a less­irritating alternative dressing together.\\n3) Notify the office if significant, persistent bleeding occurs from the biopsy site.\\n4) Do not expose the wound to fresh, salty, or brackish water until it has completely healed (after about 2 weeks). Tap or chlorinated water should be fine.\\n5) A small rim of redness and a small amount of yellow debris on the wound bed are normal. If you encounter increasing warmth, redness, pain, or liquid pus after the first day, these might indicate a localized infection and you should notify our office via phone or email.\\n6) If regular bandaids are uncomfortable or irritating, then oval hydrocolloid dressings (often sold as \"blister pads\") can be cut to fit and placed on the wound after the first 3­4 days, and changed out every 3­4 days. It is ok to wear these dressings in the shower or pool.\\n7) If stitches have been placed, you will need to return to the clinic in 1 or 2 weeks to have them professionally removed. Cutting the knots yourself may leave irritating portions of suture material under the skin.\\n8) Do not assume that \"no news is good news.\" If you have not received a call from our office within 7 days, please let us know so that we can investigate what might be holding up the biopsy report.\\n9) Wound care should continue until the biopsy site is pink, smooth, and dry with new skin, usually by 2 weeks.
Anesthesia Volume In Cc (Will Not Render If 0): 0.7
Lab Facility: [id number]
Hemostasis: Leanne's
Anesthesia Volume In Cc (Will Not Render If 0): 1.5
Lab: [id number]
Path Notes Override (Will Replace All Of The Above Text): recently bleeding per patient

## 2018-10-04 NOTE — HPI: SKIN LESIONS
Is This A New Presentation, Or A Follow-Up?: Skin Lesion
How Severe Is Your Skin Lesion?: moderate
Have Your Skin Lesions Been Treated?: not been treated
Is This A New Presentation, Or A Follow-Up?: Skin Lesions

## 2018-10-04 NOTE — PROCEDURE: COUNSELING
Detail Level: Detailed
Patient Specific Counseling (Will Not Stick From Patient To Patient): 5d before next appt pt is to begin 5-FU 5% cream with calcipotriene 0.005% foam BID to scalp, forehead, temples, cheeks, top of nose

## 2018-10-08 ENCOUNTER — TELEPHONE (OUTPATIENT)
Dept: INFECTIOUS DISEASES | Facility: CLINIC | Age: 78
End: 2018-10-08

## 2018-11-08 ENCOUNTER — APPOINTMENT (RX ONLY)
Dept: URBAN - METROPOLITAN AREA CLINIC 98 | Facility: CLINIC | Age: 78
Setting detail: DERMATOLOGY
End: 2018-11-08

## 2018-11-08 DIAGNOSIS — L57.0 ACTINIC KERATOSIS: ICD-10-CM

## 2018-11-08 DIAGNOSIS — L82.1 OTHER SEBORRHEIC KERATOSIS: ICD-10-CM

## 2018-11-08 PROBLEM — C44.612 BASAL CELL CARCINOMA OF SKIN OF RIGHT UPPER LIMB, INCLUDING SHOULDER: Status: ACTIVE | Noted: 2018-11-08

## 2018-11-08 PROCEDURE — ? CURETTAGE AND DESTRUCTION

## 2018-11-08 PROCEDURE — 17261 DSTRJ MAL LES T/A/L .6-1.0CM: CPT

## 2018-11-08 PROCEDURE — 99213 OFFICE O/P EST LOW 20 MIN: CPT | Mod: 25

## 2018-11-08 PROCEDURE — ? COUNSELING

## 2018-11-08 ASSESSMENT — LOCATION ZONE DERM
LOCATION ZONE: LEG
LOCATION ZONE: FACE

## 2018-11-08 ASSESSMENT — LOCATION DETAILED DESCRIPTION DERM
LOCATION DETAILED: LEFT POPLITEAL SKIN
LOCATION DETAILED: LEFT FOREHEAD
LOCATION DETAILED: RIGHT LATERAL FOREHEAD

## 2018-11-08 ASSESSMENT — LOCATION SIMPLE DESCRIPTION DERM
LOCATION SIMPLE: RIGHT FOREHEAD
LOCATION SIMPLE: LEFT POPLITEAL SKIN
LOCATION SIMPLE: LEFT FOREHEAD

## 2018-11-08 NOTE — PROCEDURE: COUNSELING
Detail Level: Detailed
Patient Specific Counseling (Will Not Stick From Patient To Patient): Pt to start applying Sorilux foam + 5-fluorouracil cream BID to frontal scalp, forehead, cheeks, top of nose BID beginning 5d before his next scheduled visit in Feb.

## 2018-11-08 NOTE — PROCEDURE: CURETTAGE AND DESTRUCTION
Concentration (Mg/Ml Or Millions Of Plaque Forming Units/Cc): 0.01
Biopsy Photograph Reviewed: Yes
Render Post-Care Instructions In Note?: no
Cautery Type: electrodesiccation
Consent was obtained from the patient. The risks, benefits and alternatives to therapy were discussed in detail. Specifically, the risks of infection, scarring, bleeding, prolonged wound healing, nerve injury, incomplete removal, allergy to anesthesia and recurrence were addressed. Alternatives to ED&C, such as: surgical removal and XRT were also discussed.  Prior to the procedure, the treatment site was clearly identified and confirmed by the patient. All components of Universal Protocol/PAUSE Rule completed.
Post-Care Instructions: Instructions for After Your Removal / Destruction\\n1) Gently wash the biopsy site with regular soap and water daily. If a scab develops, you can dilute hydrogen peroxide 1:1 with tap water and apply it to dissolve the crust.\\n2) Keep a small amount of white petrolatum (Aquaphor) and a non­stick bandage on the dressing at all times. Antibiotic ointments (Neosporin, etc) have not shown any superiority to simple petrolatum, cost more, and run the risk of a contact allergy. Keeping the wound covered has been shown to be superior to \"airing it out.\" If the bandage is irritating you, let us know and we can find a less­irritating alternative dressing together.\\n3) Notify the office if significant, persistent bleeding occurs from the biopsy site.\\n4) Do not expose the wound to fresh, salty, or brackish water until it has completely healed (after about 2 weeks). Tap or chlorinated water should be fine.\\n5) A small rim of redness and a small amount of yellow debris on the wound bed are normal. If you encounter increasing warmth, redness, pain, or liquid pus after the first day, these might indicate a localized infection and you should notify our office via phone or email.\\n6) Wound care should continue until the biopsy site is pink, smooth, and dry with new skin.\\n7) After 1­2 weeks, you may switch from petrolatum and a bandaid to a hydrocolloid dressing such as BandAid Advanced Healing Blister Care pads. These dressings are changed out every 3 days, and it is okay to get them wet.
Size Of Lesion In Cm: 0.7
Total Volume (Ccs): 1
Additional Information: (Optional): The wound was cleaned, and a pressure dressing was applied.  The patient received detailed post-op instructions.
Anesthesia Type: 1% lidocaine without epinephrine
What Was Performed First?: Curettage
Bill As A Line Item Or As Units: Line Item
Detail Level: Detailed
Number Of Curettages: 3
Anesthesia Volume In Cc: 1.5
Size Of Lesion After Curettage: 0.8

## 2019-01-04 DIAGNOSIS — G47.00 INSOMNIA, UNSPECIFIED TYPE: ICD-10-CM

## 2019-01-04 RX ORDER — ESZOPICLONE 3 MG/1
3 TABLET, FILM COATED ORAL NIGHTLY
Qty: 30 TABLET | Refills: 3 | Status: SHIPPED | OUTPATIENT
Start: 2019-01-04 | End: 2019-04-27 | Stop reason: SDUPTHER

## 2019-02-26 ENCOUNTER — APPOINTMENT (RX ONLY)
Dept: URBAN - METROPOLITAN AREA CLINIC 98 | Facility: CLINIC | Age: 79
Setting detail: DERMATOLOGY
End: 2019-02-26

## 2019-02-26 ENCOUNTER — RX ONLY (OUTPATIENT)
Age: 79
Setting detail: RX ONLY
End: 2019-02-26

## 2019-02-26 DIAGNOSIS — Z85.828 PERSONAL HISTORY OF OTHER MALIGNANT NEOPLASM OF SKIN: ICD-10-CM

## 2019-02-26 DIAGNOSIS — L40.0 PSORIASIS VULGARIS: ICD-10-CM | Status: INADEQUATELY CONTROLLED

## 2019-02-26 DIAGNOSIS — L82.1 OTHER SEBORRHEIC KERATOSIS: ICD-10-CM

## 2019-02-26 DIAGNOSIS — L57.0 ACTINIC KERATOSIS: ICD-10-CM

## 2019-02-26 PROCEDURE — 99214 OFFICE O/P EST MOD 30 MIN: CPT

## 2019-02-26 PROCEDURE — ? TREATMENT REGIMEN

## 2019-02-26 PROCEDURE — ? COUNSELING

## 2019-02-26 RX ORDER — HYDROCORTISONE 25 MG/G
CREAM TOPICAL
Qty: 1 | Refills: 2 | Status: ERX | COMMUNITY
Start: 2019-02-26

## 2019-02-26 ASSESSMENT — LOCATION DETAILED DESCRIPTION DERM
LOCATION DETAILED: LEFT SUPERIOR UPPER BACK
LOCATION DETAILED: RIGHT ANTERIOR SHOULDER
LOCATION DETAILED: LEFT SUPERIOR MEDIAL MIDBACK
LOCATION DETAILED: RIGHT DISTAL DORSAL FOREARM
LOCATION DETAILED: NASAL DORSUM
LOCATION DETAILED: RIGHT LATERAL UPPER BACK
LOCATION DETAILED: RIGHT ULNAR DORSAL HAND
LOCATION DETAILED: LEFT CENTRAL FRONTAL SCALP
LOCATION DETAILED: RIGHT LATERAL ABDOMEN
LOCATION DETAILED: RIGHT DORSAL THUMB METACARPOPHALANGEAL JOINT
LOCATION DETAILED: RIGHT DISTAL POSTERIOR THIGH
LOCATION DETAILED: LEFT RADIAL DORSAL HAND
LOCATION DETAILED: RIGHT SUPERIOR FOREHEAD
LOCATION DETAILED: LEFT DISTAL DORSAL FOREARM
LOCATION DETAILED: RIGHT ANTERIOR DISTAL THIGH
LOCATION DETAILED: RIGHT CENTRAL FRONTAL SCALP
LOCATION DETAILED: LEFT SUPERIOR FOREHEAD
LOCATION DETAILED: RIGHT POSTERIOR SHOULDER

## 2019-02-26 ASSESSMENT — LOCATION SIMPLE DESCRIPTION DERM
LOCATION SIMPLE: RIGHT FOREARM
LOCATION SIMPLE: RIGHT HAND
LOCATION SIMPLE: LEFT SCALP
LOCATION SIMPLE: RIGHT THIGH
LOCATION SIMPLE: LEFT LOWER BACK
LOCATION SIMPLE: NOSE
LOCATION SIMPLE: RIGHT UPPER BACK
LOCATION SIMPLE: RIGHT POSTERIOR THIGH
LOCATION SIMPLE: RIGHT THUMB
LOCATION SIMPLE: RIGHT SHOULDER
LOCATION SIMPLE: LEFT UPPER BACK
LOCATION SIMPLE: LEFT FOREARM
LOCATION SIMPLE: LEFT HAND
LOCATION SIMPLE: LEFT FOREHEAD
LOCATION SIMPLE: ABDOMEN
LOCATION SIMPLE: RIGHT SCALP
LOCATION SIMPLE: RIGHT FOREHEAD

## 2019-02-26 ASSESSMENT — LOCATION ZONE DERM
LOCATION ZONE: FACE
LOCATION ZONE: FINGER
LOCATION ZONE: SCALP
LOCATION ZONE: ARM
LOCATION ZONE: NOSE
LOCATION ZONE: TRUNK
LOCATION ZONE: LEG
LOCATION ZONE: HAND

## 2019-02-26 ASSESSMENT — BSA PSORIASIS: % BODY COVERED IN PSORIASIS: 6

## 2019-02-26 NOTE — PROCEDURE: TREATMENT REGIMEN
Discontinue Regimen: Calcipotriene 0.005%/5-Fu cream BID on central face
Continue Regimen: Calcipotriene 0.005%/5-Fu cream BID to top of forehead and scalp for 2 more days
Initiate Treatment: Apply clobetasol 0.05% ointment to face BID for 2 days only, then apply Hydrocortisone 2.5% BID prn for irritation, otherwise use moisturizer\\nApply Calcipotriene 0.005%/5-Fu cream BID to upper back for 7 days and tops of hands and forearms for 10 days.
Detail Level: Zone
Continue Regimen: Apply clobetasol/ Betamethasome 0.05% ointment BID for 2 weeks or BIW
Initiate Treatment: Wash plaques with coal tar shampoo leave on 5 min then rinse

## 2019-03-01 ENCOUNTER — RX ONLY (OUTPATIENT)
Age: 79
Setting detail: RX ONLY
End: 2019-03-01

## 2019-03-01 RX ORDER — MINOCYCLINE HYDROCHLORIDE 100 MG/1
CAPSULE ORAL
Qty: 14 | Refills: 0 | Status: ACTIVE

## 2019-03-01 RX ORDER — MINOCYCLINE HYDROCHLORIDE 100 MG/1
CAPSULE ORAL
Qty: 14 | Refills: 0 | Status: ERX | COMMUNITY
Start: 2019-03-01

## 2019-04-27 DIAGNOSIS — G47.00 INSOMNIA, UNSPECIFIED TYPE: ICD-10-CM

## 2019-04-27 RX ORDER — ESZOPICLONE 3 MG/1
TABLET, FILM COATED ORAL
Qty: 30 TABLET | Refills: 4 | Status: SHIPPED | OUTPATIENT
Start: 2019-04-27 | End: 2019-05-07 | Stop reason: SDUPTHER

## 2019-05-06 DIAGNOSIS — I25.10 CORONARY ARTERY DISEASE INVOLVING NATIVE CORONARY ARTERY OF NATIVE HEART WITHOUT ANGINA PECTORIS: Primary | ICD-10-CM

## 2019-05-07 ENCOUNTER — TELEPHONE (OUTPATIENT)
Dept: INFECTIOUS DISEASES | Facility: CLINIC | Age: 79
End: 2019-05-07

## 2019-05-07 DIAGNOSIS — G47.00 INSOMNIA, UNSPECIFIED TYPE: ICD-10-CM

## 2019-05-07 RX ORDER — ESZOPICLONE 3 MG/1
TABLET, FILM COATED ORAL
Qty: 30 TABLET | Refills: 4 | Status: SHIPPED | OUTPATIENT
Start: 2019-05-07 | End: 2019-09-17 | Stop reason: SDUPTHER

## 2019-06-20 DIAGNOSIS — E78.5 HYPERLIPIDEMIA, UNSPECIFIED HYPERLIPIDEMIA TYPE: ICD-10-CM

## 2019-06-20 RX ORDER — ROSUVASTATIN CALCIUM 20 MG/1
TABLET, COATED ORAL
Qty: 90 TABLET | Refills: 0 | Status: SHIPPED | OUTPATIENT
Start: 2019-06-20 | End: 2019-09-13 | Stop reason: SDUPTHER

## 2019-09-13 DIAGNOSIS — E78.5 HYPERLIPIDEMIA, UNSPECIFIED HYPERLIPIDEMIA TYPE: ICD-10-CM

## 2019-09-16 DIAGNOSIS — E78.5 HYPERLIPIDEMIA, UNSPECIFIED HYPERLIPIDEMIA TYPE: ICD-10-CM

## 2019-09-16 RX ORDER — ROSUVASTATIN CALCIUM 20 MG/1
20 TABLET, COATED ORAL DAILY
Qty: 90 TABLET | Refills: 0 | Status: SHIPPED | OUTPATIENT
Start: 2019-09-16 | End: 2019-12-19 | Stop reason: SDUPTHER

## 2019-09-16 RX ORDER — ROSUVASTATIN CALCIUM 20 MG/1
TABLET, COATED ORAL
Qty: 90 TABLET | Refills: 0 | Status: SHIPPED | OUTPATIENT
Start: 2019-09-16 | End: 2019-11-07 | Stop reason: SDUPTHER

## 2019-09-16 NOTE — TELEPHONE ENCOUNTER
----- Message from Fela Mckinley RN sent at 9/16/2019  9:53 AM CDT -----  Contact: Pt       ----- Message -----  From: Lisa Stephenson  Sent: 9/16/2019   9:32 AM  To: [facility] Staff    Refill or New Rx:  RX Name and Strength:rosuvastatin (CRESTOR) 20 MG tablet & eszopiclone (LUNESTA) 3 mg Tab  How is the patient currently taking it? (ex. 1XDay):  Is this a 30 day or 90 day RX:  Preferred Pharmacy with phone number:[facility] #[id number] - [address] AT [facility]   [phone] (Phone)  [phone] (Fax)  Local or Mail Order:  Ordering Provider:  Best Call Back Number:[phone]  Additional Information:Pt is going out the [facility] on 9/18  and needs medication ASAP Refilled and states that the medication eszopiclone (LUNESTA) 3 mg Tab needs a Dr Over ride it a early refill   Thank You  MAGALI Stephenson

## 2019-09-17 ENCOUNTER — TELEPHONE (OUTPATIENT)
Dept: INFECTIOUS DISEASES | Facility: HOSPITAL | Age: 79
End: 2019-09-17

## 2019-09-17 DIAGNOSIS — G47.00 INSOMNIA, UNSPECIFIED TYPE: ICD-10-CM

## 2019-09-17 RX ORDER — ESZOPICLONE 3 MG/1
TABLET, FILM COATED ORAL
Qty: 30 TABLET | Refills: 0
Start: 2019-09-17 | End: 2019-11-05 | Stop reason: SDUPTHER

## 2019-09-17 NOTE — TELEPHONE ENCOUNTER
----- Message from Edwin Syed MA sent at 9/16/2019  2:33 PM CDT -----  Contact: Barron yepez/   [facility]  tel:   [phone]        ----- Message -----  From: Radha Kanwal  Sent: 9/16/2019   2:15 PM  To: HAN Abebe Dr.'s  Pt./      Call[place]. [place] leaving [place] and he needs refills for Lunesta Generic;  Caller has one on hold.  Can you approve this early?  Pls call.

## 2019-09-17 NOTE — TELEPHONE ENCOUNTER
Kamala, please call the pharmacist and approve. I cannot figure out which pharmacy this is from the patient's chart.

## 2019-09-18 DIAGNOSIS — C67.2 MALIGNANT NEOPLASM OF LATERAL WALL OF URINARY BLADDER: Primary | ICD-10-CM

## 2019-10-01 ENCOUNTER — TELEPHONE (OUTPATIENT)
Dept: UROLOGY | Facility: CLINIC | Age: 79
End: 2019-10-01

## 2019-10-07 ENCOUNTER — TELEPHONE (OUTPATIENT)
Dept: UROLOGY | Facility: CLINIC | Age: 79
End: 2019-10-07

## 2019-10-07 NOTE — TELEPHONE ENCOUNTER
----- Message from Shaista Okeefe sent at 10/7/2019  8:32 AM CDT -----  Contact: Patient   Patient Requesting Appointment.     Reason for appt.: Patient states that he is returning call to [facility] regarding scheduling appt. Please contact patient to schedule cystoscopy.     Communication Preference: [phone] (before 9am or after 11am)

## 2019-10-16 NOTE — PROGRESS NOTES
Subjective:   Patient ID:  Gurjit Valentin is a 79 y.o. male who presents for follow up of Coronary Artery Disease; Carotid Artery Disease; Dyslipidemia; and Obesity      Assessment:     1. CAD (LAD 50%) without angina pectoris, stress test unchanged, negative echo stress   2. Mixed hyperlipidemia: LDL pending    3. Class 1 obesity due to excess calories with serious comorbidity and body mass index (BMI) of 31.0 to 31.9 in adult    4. Coronary artery disease involving native coronary artery without angina pectoris, unspecified whether native or transplanted heart        Plan:     RTC 1 yr with lipids and stress echo  Cont meds.      HPI: Doing well.  Exercises regularly.  No angina or heart failure symptoms    Review of Systems   Cardiovascular: Negative for chest pain, claudication, cyanosis, dyspnea on exertion, irregular heartbeat, leg swelling, near-syncope, orthopnea, palpitations, paroxysmal nocturnal dyspnea and syncope.   Respiratory: Negative for shortness of breath.    Neurological: Negative for brief paralysis, dizziness and focal weakness.       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    Sleep apnea        Past Surgical History:   Procedure Laterality Date    BLADDER SURGERY      CARDIAC CATHETERIZATION      COLONOSCOPY N/A 12/4/2017    Procedure: COLONOSCOPY;  Surgeon: Kenneth Spicer MD;  Location: [address];  Service: Endoscopy;  Laterality: N/A;    CYSTOSCOPY      EYE SURGERY      bilateral    KIDNEY STONE SURGERY      LITHOTRIPSY      TONSILLECTOMY      at age 4       Social History     Tobacco Use    Smoking status: Former Smoker     Packs/day: 0.25     Years: 20.00     Pack years: 5.00     Last attempt to quit: 1/1/1988      Years since quittin.8    Smokeless tobacco: Never Used    Tobacco comment: STOPPED 30 YRS AGO.   Substance Use Topics    Alcohol use: Yes     Alcohol/week: 9.8 standard drinks     Types: 9 Glasses of wine, 1 Standard drinks or equivalent per week     Comment: weekly    Drug use: No       Family History   Problem Relation Age of Onset    Hypertension Mother     Hypertension Brother     Heart attack Brother     No Known Problems Father     No Known Problems Maternal Grandmother     No Known Problems Maternal Grandfather     No Known Problems Paternal Grandmother     No Known Problems Paternal Grandfather     No Known Problems Sister     No Known Problems Maternal Aunt     No Known Problems Maternal Uncle     No Known Problems Paternal Aunt     No Known Problems Paternal Uncle     Colon polyps Neg Hx     Cancer Neg Hx     Heart disease Neg Hx     Anemia Neg Hx     Arrhythmia Neg Hx     Asthma Neg Hx     Clotting disorder Neg Hx     Fainting Neg Hx     Heart failure Neg Hx     Hyperlipidemia Neg Hx        Current Outpatient Medications   Medication Sig    aspirin (ECOTRIN) 81 MG EC tablet Take 81 mg by mouth once daily.    eszopiclone (LUNESTA) 3 mg Tab TAKE 1 TABLET BY MOUTH EVERY EVENING    omega-3 fatty acids (FISH OIL) 300 mg Cap Take 300 mg by mouth.    ramipril (ALTACE) 5 MG capsule Take 1 capsule (5 mg total) by mouth once daily.    rosuvastatin (CRESTOR) 20 MG tablet Take 1 tablet (20 mg total) by mouth once daily.    desonide (DESOWEN) 0.05 % cream Apply 1 application topically Daily.    loratadine (CLARITIN) 10 mg tablet Take 10 mg by mouth once daily.    rosuvastatin (CRESTOR) 20 MG tablet TAKE 1 TABLET BY MOUTH ONCE DAILY     No current facility-administered medications for this visit.        Review of patient's allergies indicates:  No Known Allergies    Objective:     /77 (BP Location: Right arm, Patient Position: Sitting, BP Method: Large (Automatic))   Pulse  61     Physical Exam   Constitutional: He is oriented to person, place, and time. He appears well-developed and well-nourished.   HENT:   Head: Normocephalic and atraumatic.   Eyes: Pupils are equal, round, and reactive to light. Conjunctivae and EOM are normal.   Neck: Normal range of motion. Neck supple. No thyromegaly present.   Cardiovascular: Normal rate, regular rhythm and normal heart sounds. Exam reveals no gallop and no friction rub.   No murmur heard.  Pulmonary/Chest: Effort normal and breath sounds normal. No respiratory distress. He has no wheezes. He has no rales. He exhibits no tenderness.   Abdominal: Soft. Bowel sounds are normal. He exhibits no distension. There is no tenderness.   Musculoskeletal: Normal range of motion.   Neurological: He is alert and oriented to person, place, and time. He has normal reflexes. No cranial nerve deficit. Coordination normal.   Skin: Skin is warm and dry.   Psychiatric: He has a normal mood and affect. His behavior is normal. Judgment and thought content normal.   Nursing note and vitals reviewed.        Chemistry        Component Value Date/Time     09/20/2017 0846    K 4.4 09/20/2017 0846     09/20/2017 0846    CO2 26 09/20/2017 0846    BUN 19 09/20/2017 0846    CREATININE 0.9 09/20/2017 0846     (H) 09/20/2017 0846        Component Value Date/Time    CALCIUM 9.2 09/20/2017 0846    ALKPHOS 38 (L) 09/20/2017 0846    AST 18 09/20/2017 0846    ALT 16 09/20/2017 0846    BILITOT 0.4 09/20/2017 0846    ESTGFRAFRICA >60.0 09/20/2017 0846    EGFRNONAA >60.0 09/20/2017 0846            Lab Results   Component Value Date    CHOL 143 06/29/2018    CHOL 148 09/20/2017    CHOL 144 07/05/2017     Lab Results   Component Value Date    HDL 63 06/29/2018    HDL 65 09/20/2017    HDL 60 07/05/2017     Lab Results   Component Value Date    LDLCALC 66.6 06/29/2018    LDLCALC 73.0 09/20/2017    LDLCALC 69.4 07/05/2017     Lab Results   Component Value Date    TRIG 67  06/29/2018    TRIG 50 09/20/2017    TRIG 73 07/05/2017     Lab Results   Component Value Date    CHOLHDL 44.1 06/29/2018    CHOLHDL 43.9 09/20/2017    CHOLHDL 41.7 07/05/2017         Lab Results   Component Value Date     09/20/2017    K 4.4 09/20/2017     09/20/2017    CO2 26 09/20/2017    BUN 19 09/20/2017    CREATININE 0.9 09/20/2017     (H) 09/20/2017    HGBA1C 5.8 (H) 09/20/2017    AST 18 09/20/2017    ALT 16 09/20/2017    ALBUMIN 3.6 09/20/2017    PROT 6.8 09/20/2017    BILITOT 0.4 09/20/2017    WBC 5.60 09/20/2017    HGB 14.2 09/20/2017    HCT 40.8 09/20/2017    MCV 89 09/20/2017     09/20/2017    INR 1.0 06/01/2010    PSA 1.2 09/20/2017    TSH 1.064 02/25/2013    CHOL 143 06/29/2018    HDL 63 06/29/2018    LDLCALC 66.6 06/29/2018    TRIG 67 06/29/2018

## 2019-10-18 ENCOUNTER — OFFICE VISIT (OUTPATIENT)
Dept: CARDIOLOGY | Facility: CLINIC | Age: 79
End: 2019-10-18
Payer: MEDICARE

## 2019-10-18 ENCOUNTER — HOSPITAL ENCOUNTER (OUTPATIENT)
Dept: CARDIOLOGY | Facility: CLINIC | Age: 79
Discharge: HOME | End: 2019-10-18
Attending: INTERNAL MEDICINE
Payer: MEDICARE

## 2019-10-18 VITALS — HEART RATE: 61 BPM | DIASTOLIC BLOOD PRESSURE: 77 MMHG | SYSTOLIC BLOOD PRESSURE: 117 MMHG

## 2019-10-18 VITALS — BODY MASS INDEX: 30.66 KG/M2 | WEIGHT: 219 LBS | HEIGHT: 71 IN

## 2019-10-18 DIAGNOSIS — E66.09 CLASS 1 OBESITY DUE TO EXCESS CALORIES WITH SERIOUS COMORBIDITY AND BODY MASS INDEX (BMI) OF 31.0 TO 31.9 IN ADULT: ICD-10-CM

## 2019-10-18 DIAGNOSIS — E78.2 MIXED HYPERLIPIDEMIA: ICD-10-CM

## 2019-10-18 DIAGNOSIS — I25.10 CORONARY ARTERY DISEASE INVOLVING NATIVE CORONARY ARTERY OF NATIVE HEART WITHOUT ANGINA PECTORIS: ICD-10-CM

## 2019-10-18 DIAGNOSIS — I25.10 CORONARY ARTERY DISEASE INVOLVING NATIVE CORONARY ARTERY WITHOUT ANGINA PECTORIS, UNSPECIFIED WHETHER NATIVE OR TRANSPLANTED HEART: ICD-10-CM

## 2019-10-18 DIAGNOSIS — I25.10 CORONARY ARTERY DISEASE INVOLVING NATIVE CORONARY ARTERY OF NATIVE HEART WITHOUT ANGINA PECTORIS: Primary | ICD-10-CM

## 2019-10-18 LAB
ASCENDING AORTA: 3.41 CM
BSA FOR ECHO PROCEDURE: 2.23 M2
CV ECHO LV RWT: 0.31 CM
CV STRESS BASE HR: 57 BPM
DIASTOLIC BLOOD PRESSURE: 57 MMHG
DOP CALC LVOT AREA: 4.3 CM2
DOP CALC LVOT DIAMETER: 2.34 CM
DOP CALC LVOT PEAK VEL: 1.13 M/S
DOP CALC LVOT STROKE VOLUME: 107.89 CM3
DOP CALCLVOT PEAK VEL VTI: 25.1 CM
E WAVE DECELERATION TIME: 279.53 MSEC
E/A RATIO: 0.8
E/E' RATIO: 7.53 M/S
ECHO LV POSTERIOR WALL: 0.79 CM (ref 0.6–1.1)
FRACTIONAL SHORTENING: 35 % (ref 28–44)
INTERVENTRICULAR SEPTUM: 0.94 CM (ref 0.6–1.1)
IVRT: 0.11 MSEC
LA MAJOR: 5.05 CM
LA MINOR: 5.47 CM
LA WIDTH: 2.82 CM
LEFT ATRIUM SIZE: 3.68 CM
LEFT ATRIUM VOLUME INDEX: 21.1 ML/M2
LEFT ATRIUM VOLUME: 46.32 CM3
LEFT INTERNAL DIMENSION IN SYSTOLE: 3.27 CM (ref 2.1–4)
LEFT VENTRICLE DIASTOLIC VOLUME INDEX: 54.36 ML/M2
LEFT VENTRICLE DIASTOLIC VOLUME: 119.15 ML
LEFT VENTRICLE MASS INDEX: 69 G/M2
LEFT VENTRICLE SYSTOLIC VOLUME INDEX: 19.7 ML/M2
LEFT VENTRICLE SYSTOLIC VOLUME: 43.13 ML
LEFT VENTRICULAR INTERNAL DIMENSION IN DIASTOLE: 5.02 CM (ref 3.5–6)
LEFT VENTRICULAR MASS: 151.23 G
LV LATERAL E/E' RATIO: 7.11 M/S
LV SEPTAL E/E' RATIO: 8 M/S
MV PEAK A VEL: 0.8 M/S
MV PEAK E VEL: 0.64 M/S
OHS CV CPX 1 MINUTE RECOVERY HEART RATE: 109 BPM
OHS CV CPX 85 PERCENT MAX PREDICTED HEART RATE MALE: 119
OHS CV CPX ESTIMATED METS: 12
OHS CV CPX MAX PREDICTED HEART RATE: 140
OHS CV CPX PATIENT IS FEMALE: 0
OHS CV CPX PATIENT IS MALE: 1
OHS CV CPX PEAK DIASTOLIC BLOOD PRESSURE: 65 MMHG
OHS CV CPX PEAK HEAR RATE: 144 BPM
OHS CV CPX PEAK RATE PRESSURE PRODUCT: NORMAL
OHS CV CPX PEAK SYSTOLIC BLOOD PRESSURE: 177 MMHG
OHS CV CPX PERCENT MAX PREDICTED HEART RATE ACHIEVED: 103
OHS CV CPX RATE PRESSURE PRODUCT PRESENTING: 6099
PISA TR MAX VEL: 2.63 M/S
PULM VEIN S/D RATIO: 1.32
PV PEAK D VEL: 0.62 M/S
PV PEAK S VEL: 0.82 M/S
RA MAJOR: 4.98 CM
RA PRESSURE: 3 MMHG
RA WIDTH: 3.89 CM
RIGHT VENTRICULAR END-DIASTOLIC DIMENSION: 3.17 CM
RV TISSUE DOPPLER FREE WALL SYSTOLIC VELOCITY 1 (APICAL 4 CHAMBER VIEW): 15.01 CM/S
SINUS: 3.48 CM
STJ: 3.1 CM
STRESS ECHO POST EXERCISE DUR MIN: 7 MINUTES
STRESS ECHO POST EXERCISE DUR SEC: 29 SECONDS
STRESS ST DEPRESSION: 2 MM
SYSTOLIC BLOOD PRESSURE: 107 MMHG
TDI LATERAL: 0.09 M/S
TDI SEPTAL: 0.08 M/S
TDI: 0.09 M/S
TR MAX PG: 28 MMHG
TRICUSPID ANNULAR PLANE SYSTOLIC EXCURSION: 2.1 CM
TV REST PULMONARY ARTERY PRESSURE: 31 MMHG

## 2019-10-18 PROCEDURE — 99213 OFFICE O/P EST LOW 20 MIN: CPT | Mod: S$PBB,,, | Performed by: INTERNAL MEDICINE

## 2019-10-18 PROCEDURE — 99213 PR OFFICE/OUTPT VISIT, EST, LEVL III, 20-29 MIN: ICD-10-PCS | Mod: S$PBB,,, | Performed by: INTERNAL MEDICINE

## 2019-10-18 PROCEDURE — 93351 STRESS TTE COMPLETE: CPT | Mod: PBBFAC | Performed by: INTERNAL MEDICINE

## 2019-10-18 PROCEDURE — 99999 PR PBB SHADOW E&M-EST. PATIENT-LVL III: ICD-10-PCS | Mod: PBBFAC,,, | Performed by: INTERNAL MEDICINE

## 2019-10-18 PROCEDURE — 99213 OFFICE O/P EST LOW 20 MIN: CPT | Mod: PBBFAC,25 | Performed by: INTERNAL MEDICINE

## 2019-10-18 PROCEDURE — 99999 PR PBB SHADOW E&M-EST. PATIENT-LVL III: CPT | Mod: PBBFAC,,, | Performed by: INTERNAL MEDICINE

## 2019-10-18 PROCEDURE — 93351 ECHOCARDIOGRAM STRESS TEST (CUPID ONLY): ICD-10-PCS | Mod: 26,S$PBB,, | Performed by: INTERNAL MEDICINE

## 2019-10-18 RX ORDER — RAMIPRIL 5 MG/1
5 CAPSULE ORAL DAILY
Qty: 90 CAPSULE | Refills: 3 | Status: SHIPPED | OUTPATIENT
Start: 2019-10-18 | End: 2020-06-29 | Stop reason: SDUPTHER

## 2019-10-30 ENCOUNTER — TELEPHONE (OUTPATIENT)
Dept: INFECTIOUS DISEASES | Facility: CLINIC | Age: 79
End: 2019-10-30

## 2019-10-30 DIAGNOSIS — E78.2 MIXED HYPERLIPIDEMIA: ICD-10-CM

## 2019-10-30 DIAGNOSIS — G45.4 TRANSIENT GLOBAL AMNESIA: Primary | ICD-10-CM

## 2019-10-30 DIAGNOSIS — N20.0 RECURRENT NEPHROLITHIASIS: Chronic | ICD-10-CM

## 2019-10-30 DIAGNOSIS — C67.9 MALIGNANT NEOPLASM OF URINARY BLADDER, UNSPECIFIED SITE: ICD-10-CM

## 2019-10-30 DIAGNOSIS — Z12.5 PROSTATE CANCER SCREENING: ICD-10-CM

## 2019-11-05 ENCOUNTER — HOSPITAL ENCOUNTER (OUTPATIENT)
Dept: UROLOGY | Facility: HOSPITAL | Age: 79
Discharge: HOME | End: 2019-11-05
Attending: UROLOGY
Payer: MEDICARE

## 2019-11-05 VITALS
DIASTOLIC BLOOD PRESSURE: 90 MMHG | HEIGHT: 71 IN | RESPIRATION RATE: 17 BRPM | SYSTOLIC BLOOD PRESSURE: 142 MMHG | TEMPERATURE: 99 F | HEART RATE: 60 BPM | BODY MASS INDEX: 30.65 KG/M2 | WEIGHT: 218.94 LBS

## 2019-11-05 DIAGNOSIS — G47.00 INSOMNIA, UNSPECIFIED TYPE: ICD-10-CM

## 2019-11-05 DIAGNOSIS — C67.2 MALIGNANT NEOPLASM OF LATERAL WALL OF URINARY BLADDER: Primary | ICD-10-CM

## 2019-11-05 PROCEDURE — 52000 PR CYSTOURETHROSCOPY: ICD-10-PCS | Mod: ,,, | Performed by: UROLOGY

## 2019-11-05 PROCEDURE — 52000 CYSTOURETHROSCOPY: CPT

## 2019-11-05 PROCEDURE — 52000 CYSTOURETHROSCOPY: CPT | Mod: ,,, | Performed by: UROLOGY

## 2019-11-05 RX ORDER — ESZOPICLONE 3 MG/1
TABLET, FILM COATED ORAL
Qty: 30 TABLET | Refills: 3 | Status: SHIPPED | OUTPATIENT
Start: 2019-11-05 | End: 2020-02-02

## 2019-11-05 RX ORDER — LIDOCAINE HYDROCHLORIDE 20 MG/ML
JELLY TOPICAL
Status: COMPLETED | OUTPATIENT
Start: 2019-11-05 | End: 2019-11-05

## 2019-11-05 RX ADMIN — LIDOCAINE HYDROCHLORIDE: 20 JELLY TOPICAL at 09:11

## 2019-11-05 NOTE — PATIENT INSTRUCTIONS
What to Expect After a Cystoscopy  For the next 24-48 hours, you may feel a mild burning when you urinate. This burning is normal and expected. Drink plenty of water to dilute the urine to help relieve the burning sensation. You may also see a small amount of blood in your urine after the procedure.    Unless you are already taking antibiotics, you may be given an antibiotic after the test to prevent infection.    Signs and Symptoms to Report  Call the [facility] at [phone] if you develop any of the following:  · Fever of 101 degrees or higher  · Chills or persistent bleeding  · Inability to urinate

## 2019-11-05 NOTE — H&P
Here for surveillance cystoscopy. . 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  ROS:   HEENT: negative  Ch: no SOB  CV: No chest pain, no palpitations  Abd: no abdominal pain, nausea, or vomiting.    PE:  HEENT: normal  Chest: clear, no wheezes  CV:RRR  Abdomen:soft, nontender, no mass.

## 2019-11-05 NOTE — PROCEDURES
Surgeon: MD Anabella  Date: 11/5/2019  Procedure: cystoscopy  Preop Diagnosis: [unfilled]  Post op Diagnosis: [unfilled]  Description: Flexible cystoscope passed per urethra into bladder.  Findings: Normal urethra, normal bladder neck. Bladder visualized completely with no abnormalities found.  Estimated blood loss: none  Specimens removed: none  Recommend no further surveillance cystos.

## 2019-11-07 ENCOUNTER — OFFICE VISIT (OUTPATIENT)
Dept: INFECTIOUS DISEASES | Facility: CLINIC | Age: 79
End: 2019-11-07
Payer: MEDICARE

## 2019-11-07 ENCOUNTER — CLINICAL SUPPORT (OUTPATIENT)
Dept: INFECTIOUS DISEASES | Facility: CLINIC | Age: 79
End: 2019-11-07
Payer: MEDICARE

## 2019-11-07 VITALS
DIASTOLIC BLOOD PRESSURE: 76 MMHG | WEIGHT: 222.69 LBS | SYSTOLIC BLOOD PRESSURE: 124 MMHG | BODY MASS INDEX: 31.18 KG/M2 | HEART RATE: 63 BPM | TEMPERATURE: 98 F | HEIGHT: 71 IN

## 2019-11-07 DIAGNOSIS — L30.9 DERMATITIS: ICD-10-CM

## 2019-11-07 DIAGNOSIS — I25.10 CORONARY ARTERY DISEASE INVOLVING NATIVE CORONARY ARTERY OF NATIVE HEART WITHOUT ANGINA PECTORIS: ICD-10-CM

## 2019-11-07 DIAGNOSIS — G45.4 TRANSIENT GLOBAL AMNESIA: Primary | ICD-10-CM

## 2019-11-07 DIAGNOSIS — E78.2 MIXED HYPERLIPIDEMIA: ICD-10-CM

## 2019-11-07 DIAGNOSIS — C67.9 MALIGNANT NEOPLASM OF URINARY BLADDER, UNSPECIFIED SITE: ICD-10-CM

## 2019-11-07 DIAGNOSIS — E66.09 CLASS 1 OBESITY DUE TO EXCESS CALORIES WITH SERIOUS COMORBIDITY AND BODY MASS INDEX (BMI) OF 31.0 TO 31.9 IN ADULT: ICD-10-CM

## 2019-11-07 PROCEDURE — 99213 OFFICE O/P EST LOW 20 MIN: CPT | Mod: PBBFAC,25 | Performed by: INTERNAL MEDICINE

## 2019-11-07 PROCEDURE — G0009 ADMIN PNEUMOCOCCAL VACCINE: HCPCS | Mod: PBBFAC

## 2019-11-07 PROCEDURE — 99215 OFFICE O/P EST HI 40 MIN: CPT | Mod: S$PBB,,, | Performed by: INTERNAL MEDICINE

## 2019-11-07 PROCEDURE — 99999 PR PBB SHADOW E&M-EST. PATIENT-LVL III: CPT | Mod: PBBFAC,,, | Performed by: INTERNAL MEDICINE

## 2019-11-07 PROCEDURE — 99215 PR OFFICE/OUTPT VISIT, EST, LEVL V, 40-54 MIN: ICD-10-PCS | Mod: S$PBB,,, | Performed by: INTERNAL MEDICINE

## 2019-11-07 PROCEDURE — 99999 PR PBB SHADOW E&M-EST. PATIENT-LVL III: ICD-10-PCS | Mod: PBBFAC,,, | Performed by: INTERNAL MEDICINE

## 2019-11-07 NOTE — PROGRESS NOTES
Subjective:      Patient ID: Gurjit Valentin is a 79 y.o. male.    Chief Complaint:Annual Exam      History of Present Illness  Transient global amnesia  Pt had a spell of TGA while attending his brother who was dying. Had onset of amnesia and was briefly admitted to [place] 4/24/17 and a full neurological workup was negative. He has a similar spell of TGA in 2004, also with a negative neuro w/u.  He brought in study results from [place] which included:  CT head w/wo contrast  CTA head and neck  CT brain perfusion with contrast     He has had no further episodes of this since 2017.    Coronary artery disease: Known 50% LAD stenosis.  Pt saw Dr. Tucker 10/18/19 and he felt that all was doing well from CAD standpoint. See his note. Pt is exercising on ellipitical  25-30 min three times weekly.     Lipids well controlled:  Lab Results   Component Value Date    CHOL 154 11/05/2019    CHOL 143 06/29/2018    CHOL 148 09/20/2017     Lab Results   Component Value Date    HDL 77 (H) 11/05/2019    HDL 63 06/29/2018    HDL 65 09/20/2017     Lab Results   Component Value Date    LDLCALC 66.6 11/05/2019    LDLCALC 66.6 06/29/2018    LDLCALC 73.0 09/20/2017     Lab Results   Component Value Date    TRIG 52 11/05/2019    TRIG 67 06/29/2018    TRIG 50 09/20/2017     Lab Results   Component Value Date    CHOLHDL 50.0 11/05/2019    CHOLHDL 44.1 06/29/2018    CHOLHDL 43.9 09/20/2017       Bladder cancer  Last visit with Dr. Kyle was 12/13/16 and cysto was negative for any sign of tumor recurrence. A rescreening interval of one year was recommended         Dermatitis  Has scaly rash on left posterior thigh and a few smaller patches on lateral leg. Suspicious for psoriasis. He has seen Dr. Collins, his dermatologist, but topicals he is using have not solved the problem.     HTN  His BP appears well controlled on the ramipril 5 mg. (124/76 today)     The laboratory studies were reviewed and discussed with the pt.  Last  colonoscopy was December 2017.      Review of Systems   Constitution: Negative for chills, decreased appetite, fever, malaise/fatigue, night sweats, weight gain and weight loss.   HENT: Negative for congestion, ear pain, hearing loss, hoarse voice, sore throat and tinnitus.    Eyes: Negative for blurred vision, redness and visual disturbance.   Cardiovascular: Negative for chest pain, leg swelling and palpitations.   Respiratory: Negative for cough, hemoptysis, shortness of breath and sputum production.    Hematologic/Lymphatic: Negative for adenopathy. Does not bruise/bleed easily.   Skin: Negative for dry skin, itching, rash and suspicious lesions.   Musculoskeletal: Negative for back pain, joint pain, myalgias and neck pain.   Gastrointestinal: Negative for abdominal pain, constipation, diarrhea, heartburn, nausea and vomiting.   Genitourinary: Negative for dysuria, flank pain, frequency, hematuria, hesitancy and urgency.   Neurological: Positive for headaches. Negative for dizziness, numbness, paresthesias and weakness.   Psychiatric/Behavioral: Negative for depression and memory loss. The patient does not have insomnia and is not nervous/anxious.      Objective:   Physical Exam   Constitutional: He is oriented to person, place, and time. He appears well-developed and well-nourished.   overweight   HENT:   Head: Normocephalic and atraumatic.   Right Ear: External ear normal.   Left Ear: External ear normal.   Mouth/Throat: Oropharynx is clear and moist.   Eyes: Pupils are equal, round, and reactive to light. Conjunctivae and EOM are normal.   Neck: Normal range of motion. Neck supple. No thyromegaly present.   Cardiovascular: Normal rate, regular rhythm and normal heart sounds.   No murmur heard.  Pulmonary/Chest: Effort normal and breath sounds normal. He has no wheezes. He has no rales.   Abdominal: Soft. Bowel sounds are normal. He exhibits no mass. There is no tenderness. There is no rebound.    Musculoskeletal: Normal range of motion.   Lymphadenopathy:     He has no cervical adenopathy.   Neurological: He is alert and oriented to person, place, and time.   Skin: Skin is warm and dry.   Psychiatric: He has a normal mood and affect. His behavior is normal.   Vitals reviewed.    Assessment:       1. Transient global amnesia x 2, history of   2. Coronary artery disease involving native coronary artery of native heart without angina pectoris    3. Mixed hyperlipidemia well controlled   4. 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 Class 1 obesity due to excess calories with serious comorbidity and body mass index (BMI) of 31.0 to 31.9 in adult    6. Dermatitis (ppresumed eczema)         Plan:        1. Continue present meds   2. Weight reduction, target weight 205-210    3. Pneumovax 23 today; try to obtain Shingrix at pharmacy

## 2019-12-18 DIAGNOSIS — E78.5 HYPERLIPIDEMIA, UNSPECIFIED HYPERLIPIDEMIA TYPE: ICD-10-CM

## 2019-12-19 RX ORDER — ROSUVASTATIN CALCIUM 20 MG/1
TABLET, COATED ORAL
Qty: 90 TABLET | Refills: 3 | Status: SHIPPED | OUTPATIENT
Start: 2019-12-19 | End: 2020-10-30 | Stop reason: SDUPTHER

## 2020-02-01 DIAGNOSIS — G47.00 INSOMNIA, UNSPECIFIED TYPE: ICD-10-CM

## 2020-02-02 RX ORDER — ESZOPICLONE 3 MG/1
TABLET, FILM COATED ORAL
Qty: 30 TABLET | Refills: 4 | Status: SHIPPED | OUTPATIENT
Start: 2020-02-02 | End: 2020-06-29

## 2020-02-06 ENCOUNTER — OFFICE VISIT (OUTPATIENT)
Dept: OPHTHALMOLOGY | Facility: CLINIC | Age: 80
End: 2020-02-06
Payer: MEDICARE

## 2020-02-06 VITALS — SYSTOLIC BLOOD PRESSURE: 106 MMHG | DIASTOLIC BLOOD PRESSURE: 70 MMHG | HEART RATE: 55 BPM

## 2020-02-06 DIAGNOSIS — H43.813 POSTERIOR VITREOUS DETACHMENT, BILATERAL: ICD-10-CM

## 2020-02-06 DIAGNOSIS — H20.9 UVEITIS-HYPHEMA-GLAUCOMA SYNDROME OF RIGHT EYE: ICD-10-CM

## 2020-02-06 DIAGNOSIS — H40.41X0 UVEITIS-HYPHEMA-GLAUCOMA SYNDROME OF RIGHT EYE: ICD-10-CM

## 2020-02-06 DIAGNOSIS — T85.398A UVEITIS-HYPHEMA-GLAUCOMA SYNDROME OF RIGHT EYE: ICD-10-CM

## 2020-02-06 DIAGNOSIS — H43.11 VITREOUS HEMORRHAGE, RIGHT: ICD-10-CM

## 2020-02-06 PROCEDURE — 99999 PR PBB SHADOW E&M-EST. PATIENT-LVL III: ICD-10-PCS | Mod: PBBFAC,,, | Performed by: OPHTHALMOLOGY

## 2020-02-06 PROCEDURE — 92004 PR EYE EXAM, NEW PATIENT,COMPREHESV: ICD-10-PCS | Mod: S$PBB,,, | Performed by: OPHTHALMOLOGY

## 2020-02-06 PROCEDURE — 99213 OFFICE O/P EST LOW 20 MIN: CPT | Mod: PBBFAC,PO | Performed by: OPHTHALMOLOGY

## 2020-02-06 PROCEDURE — 92004 COMPRE OPH EXAM NEW PT 1/>: CPT | Mod: S$PBB,,, | Performed by: OPHTHALMOLOGY

## 2020-02-06 PROCEDURE — 92201 OPSCPY EXTND RTA DRAW UNI/BI: CPT | Mod: ,,, | Performed by: OPHTHALMOLOGY

## 2020-02-06 PROCEDURE — 92201 PR OPHTHALMOSCOPY, EXT, W/RET DRAW/SCLERAL DEPR, I&R, UNI/BI: ICD-10-PCS | Mod: ,,, | Performed by: OPHTHALMOLOGY

## 2020-02-06 PROCEDURE — 99999 PR PBB SHADOW E&M-EST. PATIENT-LVL III: CPT | Mod: PBBFAC,,, | Performed by: OPHTHALMOLOGY

## 2020-02-06 NOTE — PROGRESS NOTES
"HPI     Dislocated iol / Vit heme per Dr. Gayle    Around 10 am yesterday morning went blind and everything went white in OD.Mr. Chapa reports seeing Dr. Gayle shortly after and was told IOL displacement is rubbing an area of the eye causing vit heme "bleeding".    Today he is seeing very good "like normal".   2 years ago fell and hit inner corner of OD with a sharp edge of a dresser   really bad injury around OD.  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 PVD OU    3. PCIOL OU      Can return to me if recurrent disease  IOP check with Dr. gayle 3-4 weeks              "

## 2020-03-29 ENCOUNTER — PATIENT MESSAGE (OUTPATIENT)
Dept: CARDIOLOGY | Facility: CLINIC | Age: 80
End: 2020-03-29

## 2020-06-29 DIAGNOSIS — I25.10 CORONARY ARTERY DISEASE INVOLVING NATIVE CORONARY ARTERY WITHOUT ANGINA PECTORIS, UNSPECIFIED WHETHER NATIVE OR TRANSPLANTED HEART: ICD-10-CM

## 2020-06-29 RX ORDER — RAMIPRIL 5 MG/1
5 CAPSULE ORAL DAILY
Qty: 90 CAPSULE | Refills: 3 | Status: SHIPPED | OUTPATIENT
Start: 2020-06-29 | End: 2020-10-30 | Stop reason: SDUPTHER

## 2020-09-02 DIAGNOSIS — I25.10 CORONARY ARTERY DISEASE INVOLVING NATIVE CORONARY ARTERY OF NATIVE HEART WITHOUT ANGINA PECTORIS: Primary | ICD-10-CM

## 2020-09-04 ENCOUNTER — TELEPHONE (OUTPATIENT)
Dept: INTERNAL MEDICINE | Facility: CLINIC | Age: 80
End: 2020-09-04

## 2020-09-04 DIAGNOSIS — E78.2 MIXED HYPERLIPIDEMIA: ICD-10-CM

## 2020-09-04 DIAGNOSIS — Z12.5 PROSTATE CANCER SCREENING: ICD-10-CM

## 2020-09-04 DIAGNOSIS — I25.10 CORONARY ARTERY DISEASE INVOLVING NATIVE CORONARY ARTERY OF NATIVE HEART WITHOUT ANGINA PECTORIS: Primary | ICD-10-CM

## 2020-09-04 NOTE — TELEPHONE ENCOUNTER
----- Message from Alexandrea Guan MA sent at 9/3/2020 12:10 PM CDT -----  Please add orders for upcoming Annual. Thanks  ----- Message -----  From: Tori Jackman LPN  Sent: 9/2/2020   3:52 PM CDT  To: Elizabeth FRANCO Staff      ----- Message -----  From: Sanjuanita Clinton  Sent: 9/2/2020   3:14 PM CDT  To: [place] Katherine L Staff    Gurjit Valentin calling regarding Orders  (message) for labs for his annual visit. He also stated he would like his lab and office visit to be in oct. Call back [phone]

## 2020-10-19 ENCOUNTER — TELEPHONE (OUTPATIENT)
Dept: INTERNAL MEDICINE | Facility: CLINIC | Age: 80
End: 2020-10-19

## 2020-10-19 NOTE — TELEPHONE ENCOUNTER
----- Message from Alexandrea Guan MA sent at 10/19/2020 11:28 AM CDT -----  He was wondering if he needed a CXR prior to his 11/04 annual    I dont see a reason he needs a chest film  katherine

## 2020-10-28 NOTE — PROGRESS NOTES
Subjective:   Patient ID:  Gurjit Valentin is a 80 y.o. male who presents for follow up of coronary artery disease    Assessment:     1. CAD (LAD 50%) without angina pectoris, stress test unchanged, negative echo stress    2. Mixed hyperlipidemia    3. Class 1 obesity due to excess calories with serious comorbidity and body mass index (BMI) of 31.0 to 31.9 in adult    4. DELVIN (obstructive sleep apnea)        Plan:     RTC 1 year with 2D ECHO and fasting lipids  Encourage weight loss through dietary modification and increase exercise.      HPI: Gurjit Valentin is a 81 y/o M with CAD (C 2007 - mid LAD 50%), HLD,  obesity who prsents for follow up.   He was last seen 10/2019. In the last year, no ER visits or hospitalizations. He returns in 1 year with lipids and stress echo. He exercises 30 mins/day on elliptical and denies any exertional chest pain or SOB. He denies any leg edema, orthopnea or PND. Weight stable at 101 kgs. He would like to lose 10-15 lbs. Discussed caloric restriction & exercising 150 - 300 mins moderate intensity exercise weekly, as tolerated.     Echo  · The left ventricle is normal in size with normal systolic function. The estimated ejection fraction is 60%.  · Normal right ventricular systolic function.  · Normal left ventricular diastolic function.     Echo Stress  · Normal left ventricular systolic function. The estimated ejection fraction is 65%  · Normal LV diastolic function.  · No wall motion abnormalities.  · Normal right ventricular systolic function.  · The estimated PA systolic pressure is 31 mm Hg  · Normal central venous pressure (3 mm Hg).  · The stress echo portion of this study is negative for myocardial ischemia. Target heart rate was achieved.  · The EKG portion of this study is positive for myocardial ischemia.  · Although the EKG response to exercise was positive, there was above average exercise capacity and no angina elicited. This is unchanged from prior  studies.  · Arrhythmias during stress: rare PVCs.  · The patient's exercise capacity was above average. Achieved 12 METs.  · The test was stopped because the patient experienced shortness of breath.    Review of Systems   Constitution: Negative for chills and fever.   Cardiovascular: Negative for chest pain, dyspnea on exertion, irregular heartbeat, near-syncope and syncope.   Respiratory: Negative for shortness of breath.    Gastrointestinal: Negative for nausea.   Neurological: Negative for headaches and weakness.   Psychiatric/Behavioral: Negative for altered mental status.       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    Sleep apnea        Past Surgical History:   Procedure Laterality Date    BLADDER SURGERY      CARDIAC CATHETERIZATION      COLONOSCOPY N/A 2017    Procedure: COLONOSCOPY;  Surgeon: Kenneth Spicer MD;  Location: [address];  Service: Endoscopy;  Laterality: N/A;    CYSTOSCOPY      EYE SURGERY      bilateral    KIDNEY STONE SURGERY      LITHOTRIPSY      TONSILLECTOMY      at age 4       Social History     Tobacco Use    Smoking status: Former Smoker     Packs/day: 0.25     Years: 20.00     Pack years: 5.00     Quit date: 1988     Years since quittin.8    Smokeless tobacco: Never Used    Tobacco comment: STOPPED 30 YRS AGO.   Substance Use Topics    Alcohol use: Yes     Alcohol/week: 9.8 standard drinks     Types: 9 Glasses of wine, 1 Standard drinks or equivalent per week     Comment: weekly    Drug use: No       Family History   Problem Relation Age of Onset    Hypertension Mother     Hypertension Brother     Heart attack Brother     No Known Problems Father     No Known Problems Maternal Grandmother      No Known Problems Maternal Grandfather     No Known Problems Paternal Grandmother     No Known Problems Paternal Grandfather     No Known Problems Sister     No Known Problems Maternal Aunt     No Known Problems Maternal Uncle     No Known Problems Paternal Aunt     No Known Problems Paternal Uncle     Colon polyps Neg Hx     Cancer Neg Hx     Heart disease Neg Hx     Anemia Neg Hx     Arrhythmia Neg Hx     Asthma Neg Hx     Clotting disorder Neg Hx     Fainting Neg Hx     Heart failure Neg Hx     Hyperlipidemia Neg Hx        Current Outpatient Medications   Medication Sig    aspirin (ECOTRIN) 81 MG EC tablet Take 81 mg by mouth once daily.    desonide (DESOWEN) 0.05 % cream Apply 1 application topically Daily.    eszopiclone (LUNESTA) 3 mg Tab TAKE 1 TABLET BY MOUTH EVERY EVENING    loratadine (CLARITIN) 10 mg tablet Take 10 mg by mouth once daily.    omega-3 fatty acids (FISH OIL) 300 mg Cap Take 300 mg by mouth.    ramipriL (ALTACE) 5 MG capsule Take 1 capsule (5 mg total) by mouth once daily.    rosuvastatin (CRESTOR) 20 MG tablet TAKE 1 TABLET BY MOUTH EVERY DAY     No current facility-administered medications for this visit.        Review of patient's allergies indicates:  No Known Allergies    Objective:     There were no vitals taken for this visit.    Physical Exam   Constitutional: He is oriented to person, place, and time. He appears well-developed and well-nourished.   HENT:   Head: Normocephalic and atraumatic.   Eyes: Pupils are equal, round, and reactive to light.   Neck: No JVD present.   Cardiovascular: Normal rate and regular rhythm.   No murmur heard.  Pulmonary/Chest: Effort normal and breath sounds normal. No respiratory distress.   Abdominal: Soft. Bowel sounds are normal. He exhibits no distension. There is no abdominal tenderness.   Musculoskeletal:         General: No edema.   Neurological: He is alert and oriented to person, place, and time.   Skin: Skin is warm  and dry. No erythema.   Psychiatric: His behavior is normal. Judgment and thought content normal.   Vitals reviewed.        Chemistry        Component Value Date/Time     11/05/2019 0820    K 4.8 11/05/2019 0820     11/05/2019 0820    CO2 28 11/05/2019 0820    BUN 18 11/05/2019 0820    CREATININE 1.1 11/05/2019 0820     (H) 11/05/2019 0820        Component Value Date/Time    CALCIUM 9.7 11/05/2019 0820    ALKPHOS 42 (L) 11/05/2019 0820    AST 20 11/05/2019 0820    ALT 21 11/05/2019 0820    BILITOT 0.5 11/05/2019 0820    ESTGFRAFRICA >60.0 11/05/2019 0820    EGFRNONAA >60.0 11/05/2019 0820            Lab Results   Component Value Date    CHOL 154 11/05/2019    CHOL 143 06/29/2018    CHOL 148 09/20/2017     Lab Results   Component Value Date    HDL 77 (H) 11/05/2019    HDL 63 06/29/2018    HDL 65 09/20/2017     Lab Results   Component Value Date    LDLCALC 66.6 11/05/2019    LDLCALC 66.6 06/29/2018    LDLCALC 73.0 09/20/2017     Lab Results   Component Value Date    TRIG 52 11/05/2019    TRIG 67 06/29/2018    TRIG 50 09/20/2017     Lab Results   Component Value Date    CHOLHDL 50.0 11/05/2019    CHOLHDL 44.1 06/29/2018    CHOLHDL 43.9 09/20/2017         Lab Results   Component Value Date     11/05/2019    K 4.8 11/05/2019     11/05/2019    CO2 28 11/05/2019    BUN 18 11/05/2019    CREATININE 1.1 11/05/2019     (H) 11/05/2019    HGBA1C 5.8 (H) 09/20/2017    AST 20 11/05/2019    ALT 21 11/05/2019    ALBUMIN 4.1 11/05/2019    PROT 7.3 11/05/2019    BILITOT 0.5 11/05/2019    WBC 5.02 11/05/2019    HGB 14.6 11/05/2019    HCT 43.7 11/05/2019    MCV 97 11/05/2019     11/05/2019    INR 1.0 06/01/2010    PSA 1.3 11/05/2019    TSH 1.064 02/25/2013    CHOL 154 11/05/2019    HDL 77 (H) 11/05/2019    LDLCALC 66.6 11/05/2019    TRIG 52 11/05/2019

## 2020-10-30 ENCOUNTER — HOSPITAL ENCOUNTER (OUTPATIENT)
Dept: CARDIOLOGY | Facility: HOSPITAL | Age: 80
Discharge: HOME | End: 2020-10-30
Attending: INTERNAL MEDICINE
Payer: MEDICARE

## 2020-10-30 ENCOUNTER — OFFICE VISIT (OUTPATIENT)
Dept: CARDIOLOGY | Facility: CLINIC | Age: 80
End: 2020-10-30
Payer: MEDICARE

## 2020-10-30 VITALS
OXYGEN SATURATION: 95 % | SYSTOLIC BLOOD PRESSURE: 122 MMHG | HEIGHT: 71 IN | BODY MASS INDEX: 31.18 KG/M2 | DIASTOLIC BLOOD PRESSURE: 73 MMHG | WEIGHT: 222.69 LBS | HEART RATE: 63 BPM

## 2020-10-30 VITALS
WEIGHT: 219 LBS | DIASTOLIC BLOOD PRESSURE: 70 MMHG | BODY MASS INDEX: 30.66 KG/M2 | HEIGHT: 71 IN | HEART RATE: 56 BPM | SYSTOLIC BLOOD PRESSURE: 110 MMHG

## 2020-10-30 DIAGNOSIS — I25.10 CORONARY ARTERY DISEASE INVOLVING NATIVE CORONARY ARTERY OF NATIVE HEART WITHOUT ANGINA PECTORIS: Primary | ICD-10-CM

## 2020-10-30 DIAGNOSIS — I25.10 CORONARY ARTERY DISEASE INVOLVING NATIVE CORONARY ARTERY OF NATIVE HEART WITHOUT ANGINA PECTORIS: ICD-10-CM

## 2020-10-30 DIAGNOSIS — G47.33 OSA (OBSTRUCTIVE SLEEP APNEA): ICD-10-CM

## 2020-10-30 DIAGNOSIS — E78.2 MIXED HYPERLIPIDEMIA: ICD-10-CM

## 2020-10-30 DIAGNOSIS — E66.09 CLASS 1 OBESITY DUE TO EXCESS CALORIES WITH SERIOUS COMORBIDITY AND BODY MASS INDEX (BMI) OF 31.0 TO 31.9 IN ADULT: ICD-10-CM

## 2020-10-30 LAB
ASCENDING AORTA: 3.57 CM
AV INDEX (PROSTH): 0.76
AV MEAN GRADIENT: 4 MMHG
AV PEAK GRADIENT: 9 MMHG
AV VALVE AREA: 3.39 CM2
AV VELOCITY RATIO: 0.79
BSA FOR ECHO PROCEDURE: 2.23 M2
CV ECHO LV RWT: 0.36 CM
DOP CALC AO PEAK VEL: 1.46 M/S
DOP CALC AO VTI: 31.97 CM
DOP CALC LVOT AREA: 4.4 CM2
DOP CALC LVOT DIAMETER: 2.38 CM
DOP CALC LVOT PEAK VEL: 1.16 M/S
DOP CALC LVOT STROKE VOLUME: 108.36 CM3
DOP CALCLVOT PEAK VEL VTI: 24.37 CM
E WAVE DECELERATION TIME: 311.19 MSEC
E/A RATIO: 0.71
E/E' RATIO: 6.53 M/S
ECHO LV POSTERIOR WALL: 0.91 CM (ref 0.6–1.1)
FRACTIONAL SHORTENING: 31 % (ref 28–44)
INTERVENTRICULAR SEPTUM: 0.92 CM (ref 0.6–1.1)
LA MAJOR: 5.25 CM
LA MINOR: 5.42 CM
LA WIDTH: 3.95 CM
LEFT ATRIUM SIZE: 3.98 CM
LEFT ATRIUM VOLUME INDEX: 32.5 ML/M2
LEFT ATRIUM VOLUME: 71.27 CM3
LEFT INTERNAL DIMENSION IN SYSTOLE: 3.46 CM (ref 2.1–4)
LEFT VENTRICLE DIASTOLIC VOLUME INDEX: 54.4 ML/M2
LEFT VENTRICLE DIASTOLIC VOLUME: 119.24 ML
LEFT VENTRICLE MASS INDEX: 74 G/M2
LEFT VENTRICLE SYSTOLIC VOLUME INDEX: 22.5 ML/M2
LEFT VENTRICLE SYSTOLIC VOLUME: 49.35 ML
LEFT VENTRICULAR INTERNAL DIMENSION IN DIASTOLE: 5.02 CM (ref 3.5–6)
LEFT VENTRICULAR MASS: 162.77 G
LV LATERAL E/E' RATIO: 6.13 M/S
LV SEPTAL E/E' RATIO: 7 M/S
MV PEAK A VEL: 0.69 M/S
MV PEAK E VEL: 0.49 M/S
MV STENOSIS PRESSURE HALF TIME: 90.25 MS
MV VALVE AREA P 1/2 METHOD: 2.44 CM2
PISA TR MAX VEL: 2.38 M/S
PULM VEIN S/D RATIO: 1.45
PV PEAK D VEL: 0.56 M/S
PV PEAK S VEL: 0.81 M/S
RA MAJOR: 5.54 CM
RA PRESSURE: 3 MMHG
RA WIDTH: 3.63 CM
RIGHT VENTRICULAR END-DIASTOLIC DIMENSION: 3.29 CM
SINUS: 3.63 CM
STJ: 2.63 CM
TDI LATERAL: 0.08 M/S
TDI SEPTAL: 0.07 M/S
TDI: 0.08 M/S
TR MAX PG: 23 MMHG
TRICUSPID ANNULAR PLANE SYSTOLIC EXCURSION: 1.72 CM
TV REST PULMONARY ARTERY PRESSURE: 26 MMHG

## 2020-10-30 PROCEDURE — 93306 TTE W/DOPPLER COMPLETE: CPT

## 2020-10-30 PROCEDURE — 99213 PR OFFICE/OUTPT VISIT, EST, LEVL III, 20-29 MIN: ICD-10-PCS | Mod: S$PBB,,, | Performed by: INTERNAL MEDICINE

## 2020-10-30 PROCEDURE — 99999 PR PBB SHADOW E&M-EST. PATIENT-LVL III: CPT | Mod: PBBFAC,,, | Performed by: INTERNAL MEDICINE

## 2020-10-30 PROCEDURE — 93306 TTE W/DOPPLER COMPLETE: CPT | Mod: 26,,, | Performed by: INTERNAL MEDICINE

## 2020-10-30 PROCEDURE — 99213 OFFICE O/P EST LOW 20 MIN: CPT | Mod: S$PBB,,, | Performed by: INTERNAL MEDICINE

## 2020-10-30 PROCEDURE — 93306 ECHO (CUPID ONLY): ICD-10-PCS | Mod: 26,,, | Performed by: INTERNAL MEDICINE

## 2020-10-30 PROCEDURE — 99213 OFFICE O/P EST LOW 20 MIN: CPT | Mod: PBBFAC,25 | Performed by: INTERNAL MEDICINE

## 2020-10-30 PROCEDURE — 99999 PR PBB SHADOW E&M-EST. PATIENT-LVL III: ICD-10-PCS | Mod: PBBFAC,,, | Performed by: INTERNAL MEDICINE

## 2020-11-04 ENCOUNTER — OFFICE VISIT (OUTPATIENT)
Dept: INTERNAL MEDICINE | Facility: CLINIC | Age: 80
End: 2020-11-04
Payer: MEDICARE

## 2020-11-04 VITALS
TEMPERATURE: 98 F | WEIGHT: 220.44 LBS | BODY MASS INDEX: 30.86 KG/M2 | HEART RATE: 52 BPM | SYSTOLIC BLOOD PRESSURE: 122 MMHG | DIASTOLIC BLOOD PRESSURE: 80 MMHG | HEIGHT: 71 IN

## 2020-11-04 DIAGNOSIS — H43.813 POSTERIOR VITREOUS DETACHMENT, BILATERAL: ICD-10-CM

## 2020-11-04 DIAGNOSIS — G45.4 TRANSIENT GLOBAL AMNESIA: Primary | ICD-10-CM

## 2020-11-04 DIAGNOSIS — E66.09 CLASS 1 OBESITY DUE TO EXCESS CALORIES WITH SERIOUS COMORBIDITY AND BODY MASS INDEX (BMI) OF 31.0 TO 31.9 IN ADULT: ICD-10-CM

## 2020-11-04 DIAGNOSIS — C67.9 MALIGNANT NEOPLASM OF URINARY BLADDER, UNSPECIFIED SITE: ICD-10-CM

## 2020-11-04 DIAGNOSIS — L30.9 DERMATITIS: ICD-10-CM

## 2020-11-04 DIAGNOSIS — I25.10 CORONARY ARTERY DISEASE INVOLVING NATIVE CORONARY ARTERY OF NATIVE HEART WITHOUT ANGINA PECTORIS: ICD-10-CM

## 2020-11-04 PROCEDURE — 99999 PR PBB SHADOW E&M-EST. PATIENT-LVL III: CPT | Mod: PBBFAC,,, | Performed by: INTERNAL MEDICINE

## 2020-11-04 PROCEDURE — 99214 OFFICE O/P EST MOD 30 MIN: CPT | Mod: S$PBB,ICN,, | Performed by: INTERNAL MEDICINE

## 2020-11-04 PROCEDURE — 90750 HZV VACC RECOMBINANT IM: CPT | Mod: PBBFAC

## 2020-11-04 PROCEDURE — 99999 PR PBB SHADOW E&M-EST. PATIENT-LVL III: ICD-10-PCS | Mod: PBBFAC,,, | Performed by: INTERNAL MEDICINE

## 2020-11-04 PROCEDURE — 99213 OFFICE O/P EST LOW 20 MIN: CPT | Mod: PBBFAC,25 | Performed by: INTERNAL MEDICINE

## 2020-11-04 PROCEDURE — 99214 PR OFFICE/OUTPT VISIT, EST, LEVL IV, 30-39 MIN: ICD-10-PCS | Mod: S$PBB,ICN,, | Performed by: INTERNAL MEDICINE

## 2020-11-04 NOTE — PROGRESS NOTES
Subjective:       Patient ID: Gurjit Valentin is a 80 y.o. male.    Chief Complaint:  Followup on multiple medical problems     HPI     Transient global amnesia  Pt had a spell of TGA while attending his brother who was dying. Had onset of amnesia and was briefly admitted to [place] 4/24/17 and a full neurological workup was negative. He has a similar spell of TGA in 2004, also with a negative neuro w/u.  He brought in study results from [place] which included:  CT head w/wo contrast  CTA head and neck  CT brain perfusion with contrast     He has had no further episodes of this since 2017.     Coronary artery disease: Known 50% LAD stenosis.  Pt saw Dr. Tucker 10/30/20 and he felt that all was doing well from CAD standpoint. See his note. Pt is exercising on [employer] 25-30 min three times weekly.    Lipids look good:  Lab Results   Component Value Date    CHOL 141 10/30/2020    CHOL 154 11/05/2019    CHOL 143 06/29/2018     Lab Results   Component Value Date    HDL 62 10/30/2020    HDL 77 (H) 11/05/2019    HDL 63 06/29/2018     Lab Results   Component Value Date    LDLCALC 69.6 10/30/2020    LDLCALC 66.6 11/05/2019    LDLCALC 66.6 06/29/2018     Lab Results   Component Value Date    TRIG 47 10/30/2020    TRIG 52 11/05/2019    TRIG 67 06/29/2018     Lab Results   Component Value Date    CHOLHDL 44.0 10/30/2020    CHOLHDL 50.0 11/05/2019    CHOLHDL 44.1 06/29/2018        Bladder cancer  Last visit with Dr. Kyle was 11/5/19 and cysto was negative for any sign of tumor recurrence. A rescreening interval of one year was recommended         Dermatitis  Has scaly rash on left posterior thigh and a few smaller patches on lateral leg. Suspicious for eczema. He has seen Dr. Collins, his dermatologist, but topicals he is using have not solved the problem. I urged him to use his steroid cream regularly.     HTN  His BP appears well controlled on the ramipril 5 mg. (122/80 today)     Had his flu shot in  Septermber.  The laboratory studies were reviewed and discussed with the pt.  CBC, sed rate and complete metabolic profile were all normal.  PSA was not done as pt is over 79. We discussed pros and cons of continued PSA testing and colonoscopy.  Last colonoscopy was December 2017.          Review of Systems   Constitution: Negative for chills, decreased appetite, fever, malaise/fatigue, night sweats, weight gain and weight loss.   HENT: Negative for congestion, ear pain, hearing loss, hoarse voice, sore throat and tinnitus.    Eyes: Negative for blurred vision, redness and visual disturbance.   Cardiovascular: Negative for chest pain, leg swelling and palpitations.   Respiratory: Negative for cough, hemoptysis, shortness of breath and sputum production.    Hematologic/Lymphatic: Negative for adenopathy. Does not bruise/bleed easily.   Skin: Negative for dry skin, itching, rash and suspicious lesions.   Musculoskeletal: Negative for back pain, joint pain, myalgias and neck pain.   Gastrointestinal: Negative for abdominal pain, constipation, diarrhea, heartburn, nausea and vomiting.   Genitourinary: Negative for dysuria, flank pain, frequency, hematuria, hesitancy and urgency.   Neurological: Negative for dizziness, headaches, numbness, paresthesias and weakness.   Psychiatric/Behavioral: Negative for depression and memory loss. The patient does not have insomnia and is not nervous/anxious.        Objective:      Physical Exam  Vitals signs reviewed.   Constitutional:       Appearance: He is well-developed.      Comments: overweight   HENT:      Head: Normocephalic and atraumatic.      Right Ear: External ear normal.      Left Ear: External ear normal.   Eyes:      Conjunctiva/sclera: Conjunctivae normal.      Pupils: Pupils are equal, round, and reactive to light.   Neck:      Musculoskeletal: Normal range of motion and neck supple.      Thyroid: No thyromegaly.   Cardiovascular:      Rate and Rhythm: Normal rate and  regular rhythm.      Heart sounds: Normal heart sounds. No murmur.   Pulmonary:      Effort: Pulmonary effort is normal.      Breath sounds: Normal breath sounds. No wheezing or rales.   Abdominal:      General: Bowel sounds are normal.      Palpations: Abdomen is soft. There is no mass.      Tenderness: There is no abdominal tenderness. There is no rebound.   Musculoskeletal: Normal range of motion.   Lymphadenopathy:      Cervical: No cervical adenopathy.   Skin:     General: Skin is warm and dry.      Comments: Eczematoid dermatitis lateral thighs   Neurological:      Mental Status: He is alert and oriented to person, place, and time.   Psychiatric:         Behavior: Behavior normal.         Assessment:       1. Transient global amnesia    2. Posterior vitreous detachment, bilateral    3. Eczematoid dermatitis    4. Coronary artery disease involving native coronary artery of native heart without angina pectoris    5. Malignant neoplasm of urinary bladder, unspecified site    6. Class 1 obesity due to excess calories with serious comorbidity and body mass index (BMI) of 31.0 to 31.9 in adult        Plan:        1. Continue present meds   2. shingrix series to start today

## 2020-11-05 ENCOUNTER — PATIENT MESSAGE (OUTPATIENT)
Dept: INTERNAL MEDICINE | Facility: CLINIC | Age: 80
End: 2020-11-05

## 2020-12-01 ENCOUNTER — TELEPHONE (OUTPATIENT)
Dept: INTERNAL MEDICINE | Facility: CLINIC | Age: 80
End: 2020-12-01

## 2020-12-01 NOTE — TELEPHONE ENCOUNTER
Email:        José Miguel Olivarez,     May be nothing but for the last 4-5 days I have been feeling a little sub par with achy back usually starting late morning though the aft, some sneezing in am but that due to allergies and not uncommon, but no cough, no fatigue and no fever.  Did have a stomach issue last night but mostly going away today.  But the achy back is what is strange as it feels almost like the reaction I had for a couple days from the flu shot in September, or perhaps a little  like the onset of the flu.  Doesnt seem to match symptoms of Covid and have had no difficulty working out yesterday.but dont usually have this many days of back ache without it  developing into something else or going away. Dont feel too bad, but not top form either.  Any advice?    Francisco    Will have him come in tomorrow for exam  katherine

## 2020-12-02 ENCOUNTER — LAB VISIT (OUTPATIENT)
Dept: LAB | Facility: HOSPITAL | Age: 80
End: 2020-12-02
Attending: INTERNAL MEDICINE
Payer: MEDICARE

## 2020-12-02 ENCOUNTER — OFFICE VISIT (OUTPATIENT)
Dept: INTERNAL MEDICINE | Facility: CLINIC | Age: 80
End: 2020-12-02
Payer: MEDICARE

## 2020-12-02 VITALS
SYSTOLIC BLOOD PRESSURE: 120 MMHG | WEIGHT: 221.31 LBS | BODY MASS INDEX: 30.87 KG/M2 | TEMPERATURE: 98 F | DIASTOLIC BLOOD PRESSURE: 76 MMHG | HEART RATE: 62 BPM

## 2020-12-02 DIAGNOSIS — Z12.5 PROSTATE CANCER SCREENING: ICD-10-CM

## 2020-12-02 DIAGNOSIS — M79.10 MYALGIA: Primary | ICD-10-CM

## 2020-12-02 LAB — COMPLEXED PSA SERPL-MCNC: 1.8 NG/ML (ref 0–4)

## 2020-12-02 PROCEDURE — 99999 PR PBB SHADOW E&M-EST. PATIENT-LVL III: CPT | Mod: PBBFAC,,, | Performed by: INTERNAL MEDICINE

## 2020-12-02 PROCEDURE — 36415 COLL VENOUS BLD VENIPUNCTURE: CPT

## 2020-12-02 PROCEDURE — 99213 OFFICE O/P EST LOW 20 MIN: CPT | Mod: S$PBB,,, | Performed by: INTERNAL MEDICINE

## 2020-12-02 PROCEDURE — 99213 PR OFFICE/OUTPT VISIT, EST, LEVL III, 20-29 MIN: ICD-10-PCS | Mod: S$PBB,,, | Performed by: INTERNAL MEDICINE

## 2020-12-02 PROCEDURE — 99999 PR PBB SHADOW E&M-EST. PATIENT-LVL III: ICD-10-PCS | Mod: PBBFAC,,, | Performed by: INTERNAL MEDICINE

## 2020-12-02 PROCEDURE — 99213 OFFICE O/P EST LOW 20 MIN: CPT | Mod: PBBFAC | Performed by: INTERNAL MEDICINE

## 2020-12-02 PROCEDURE — 84153 ASSAY OF PSA TOTAL: CPT

## 2020-12-02 RX ORDER — MUPIROCIN 20 MG/G
OINTMENT TOPICAL
Qty: 15 G | Refills: 1 | Status: SHIPPED | OUTPATIENT
Start: 2020-12-02 | End: 2021-11-09

## 2020-12-02 RX ORDER — A/SINGAPORE/GP1908/2015 IVR-180 (AN A/MICHIGAN/45/2015 (H1N1)PDM09-LIKE VIRUS, A/HONG KONG/4801/2014, NYMC X-263B (H3N2) (AN A/HONG KONG/4801/2014-LIKE VIRUS), AND B/BRISBANE/60/2008, WILD TYPE (A B/BRISBANE/60/2008-LIKE VIRUS) 15; 15; 15 UG/.5ML; UG/.5ML; UG/.5ML
INJECTION, SUSPENSION INTRAMUSCULAR
COMMUNITY
Start: 2020-09-12 | End: 2021-11-09

## 2020-12-02 NOTE — PROGRESS NOTES
"Subjective:       Patient ID: Gurjit Valentin is a 80 y.o. male.    Chief Complaint: Follow-up    HPI   For 6 days noted achiness across upper back. No fever, cough or URI sx. Felt somewhat "fluish". Told him to come in today. He said that his sx have largely resolved as of this am, but he kept appt. Also asked about intranasal tender lesions he gets occasionally. Usually puts neosporin ointment on them. I advised using bactorband instead    Review of Systems   Constitution: Negative for chills, decreased appetite, fever, malaise/fatigue, night sweats, weight gain and weight loss.   HENT: Negative for congestion, ear pain, hearing loss, hoarse voice, sore throat and tinnitus.    Eyes: Positive for blurred vision. Negative for redness and visual disturbance.   Cardiovascular: Negative for chest pain, leg swelling and palpitations.   Respiratory: Negative for cough, hemoptysis, shortness of breath and sputum production.    Hematologic/Lymphatic: Negative for adenopathy. Does not bruise/bleed easily.   Skin: Negative for dry skin, itching, rash and suspicious lesions.   Musculoskeletal: Positive for back pain. Negative for joint pain, myalgias and neck pain.   Gastrointestinal: Negative for abdominal pain, constipation, diarrhea, heartburn, nausea and vomiting.   Genitourinary: Negative for dysuria, flank pain, frequency, hematuria, hesitancy and urgency.   Neurological: Negative for dizziness, headaches, numbness, paresthesias and weakness.   Psychiatric/Behavioral: Negative for depression and memory loss. The patient does not have insomnia and is not nervous/anxious.        Objective:      Physical Exam  Vitals signs reviewed.   Constitutional:       General: He is not in acute distress.     Appearance: Normal appearance. He is not ill-appearing.   Cardiovascular:      Rate and Rhythm: Tachycardia present.      Heart sounds: No murmur. No gallop.    Pulmonary:      Effort: Pulmonary effort is normal. No respiratory " distress.      Breath sounds: Normal breath sounds. No wheezing or rales.   Neurological:      Mental Status: He is alert.         Assessment:       1. Myalgia resolved   2. Prostate cancer screening        Plan:        1. reassurred   2. Draw PSA today as was omitted last blood draw   3. rx for mupirocin ointment

## 2021-01-03 ENCOUNTER — PATIENT MESSAGE (OUTPATIENT)
Dept: ADMINISTRATIVE | Facility: OTHER | Age: 81
End: 2021-01-03

## 2021-01-03 ENCOUNTER — PATIENT MESSAGE (OUTPATIENT)
Dept: INTERNAL MEDICINE | Facility: CLINIC | Age: 81
End: 2021-01-03

## 2021-01-06 ENCOUNTER — IMMUNIZATION (OUTPATIENT)
Dept: OBSTETRICS AND GYNECOLOGY | Facility: CLINIC | Age: 81
End: 2021-01-06
Payer: MEDICARE

## 2021-01-06 DIAGNOSIS — Z23 NEED FOR VACCINATION: ICD-10-CM

## 2021-01-06 PROCEDURE — 91300 COVID-19, MRNA, LNP-S, PF, 30 MCG/0.3 ML DOSE VACCINE: CPT | Mod: PBBFAC

## 2021-01-08 ENCOUNTER — PATIENT MESSAGE (OUTPATIENT)
Dept: CARDIOLOGY | Facility: CLINIC | Age: 81
End: 2021-01-08

## 2021-01-21 ENCOUNTER — TELEPHONE (OUTPATIENT)
Dept: INTERNAL MEDICINE | Facility: CLINIC | Age: 81
End: 2021-01-21

## 2021-01-21 DIAGNOSIS — G47.00 INSOMNIA, UNSPECIFIED TYPE: ICD-10-CM

## 2021-01-21 RX ORDER — ESZOPICLONE 3 MG/1
3 TABLET, FILM COATED ORAL NIGHTLY
Qty: 60 TABLET | Refills: 1 | Status: SHIPPED | OUTPATIENT
Start: 2021-01-21 | End: 2021-05-21 | Stop reason: SDUPTHER

## 2021-01-28 ENCOUNTER — IMMUNIZATION (OUTPATIENT)
Dept: OBSTETRICS AND GYNECOLOGY | Facility: CLINIC | Age: 81
End: 2021-01-28
Payer: MEDICARE

## 2021-01-28 DIAGNOSIS — Z23 NEED FOR VACCINATION: Primary | ICD-10-CM

## 2021-01-28 PROCEDURE — 91300 COVID-19, MRNA, LNP-S, PF, 30 MCG/0.3 ML DOSE VACCINE: CPT | Mod: PBBFAC | Performed by: FAMILY MEDICINE

## 2021-01-28 PROCEDURE — 0002A COVID-19, MRNA, LNP-S, PF, 30 MCG/0.3 ML DOSE VACCINE: CPT | Mod: PBBFAC | Performed by: FAMILY MEDICINE

## 2021-05-21 ENCOUNTER — TELEPHONE (OUTPATIENT)
Dept: INTERNAL MEDICINE | Facility: CLINIC | Age: 81
End: 2021-05-21

## 2021-05-21 DIAGNOSIS — G47.00 INSOMNIA, UNSPECIFIED TYPE: ICD-10-CM

## 2021-05-21 RX ORDER — ESZOPICLONE 3 MG/1
3 TABLET, FILM COATED ORAL NIGHTLY
Qty: 60 TABLET | Refills: 3 | Status: SHIPPED | OUTPATIENT
Start: 2021-05-21 | End: 2021-11-17 | Stop reason: SDUPTHER

## 2021-07-16 DIAGNOSIS — I25.10 CORONARY ARTERY DISEASE INVOLVING NATIVE CORONARY ARTERY WITHOUT ANGINA PECTORIS, UNSPECIFIED WHETHER NATIVE OR TRANSPLANTED HEART: ICD-10-CM

## 2021-07-16 RX ORDER — RAMIPRIL 5 MG/1
5 CAPSULE ORAL DAILY
Qty: 90 CAPSULE | Refills: 3 | Status: CANCELLED | OUTPATIENT
Start: 2021-07-16

## 2021-07-17 ENCOUNTER — TELEPHONE (OUTPATIENT)
Dept: INTERNAL MEDICINE | Facility: CLINIC | Age: 81
End: 2021-07-17

## 2021-07-17 DIAGNOSIS — I25.10 CORONARY ARTERY DISEASE INVOLVING NATIVE CORONARY ARTERY WITHOUT ANGINA PECTORIS, UNSPECIFIED WHETHER NATIVE OR TRANSPLANTED HEART: ICD-10-CM

## 2021-07-17 RX ORDER — RAMIPRIL 5 MG/1
5 CAPSULE ORAL DAILY
Qty: 90 CAPSULE | Refills: 3 | Status: SHIPPED | OUTPATIENT
Start: 2021-07-17 | End: 2021-11-09 | Stop reason: SDUPTHER

## 2021-10-05 ENCOUNTER — TELEPHONE (OUTPATIENT)
Dept: INTERNAL MEDICINE | Facility: CLINIC | Age: 81
End: 2021-10-05

## 2021-10-05 DIAGNOSIS — Z12.5 PROSTATE CANCER SCREENING: ICD-10-CM

## 2021-10-05 DIAGNOSIS — I25.10 CORONARY ARTERY DISEASE INVOLVING NATIVE CORONARY ARTERY OF NATIVE HEART WITHOUT ANGINA PECTORIS: ICD-10-CM

## 2021-10-05 DIAGNOSIS — C67.9 MALIGNANT NEOPLASM OF URINARY BLADDER, UNSPECIFIED SITE: ICD-10-CM

## 2021-10-05 DIAGNOSIS — T85.398A UVEITIS-HYPHEMA-GLAUCOMA SYNDROME OF RIGHT EYE: Primary | ICD-10-CM

## 2021-10-05 DIAGNOSIS — E78.5 HYPERLIPIDEMIA, UNSPECIFIED HYPERLIPIDEMIA TYPE: ICD-10-CM

## 2021-10-05 DIAGNOSIS — H20.9 UVEITIS-HYPHEMA-GLAUCOMA SYNDROME OF RIGHT EYE: Primary | ICD-10-CM

## 2021-10-05 DIAGNOSIS — H40.41X0 UVEITIS-HYPHEMA-GLAUCOMA SYNDROME OF RIGHT EYE: Primary | ICD-10-CM

## 2021-10-05 RX ORDER — ROSUVASTATIN CALCIUM 20 MG/1
20 TABLET, COATED ORAL DAILY
Qty: 90 TABLET | Refills: 3 | Status: SHIPPED | OUTPATIENT
Start: 2021-10-05 | End: 2022-11-08 | Stop reason: SDUPTHER

## 2021-10-08 ENCOUNTER — TELEPHONE (OUTPATIENT)
Dept: DERMATOLOGY | Facility: CLINIC | Age: 81
End: 2021-10-08

## 2021-10-29 ENCOUNTER — HOSPITAL ENCOUNTER (OUTPATIENT)
Dept: CARDIOLOGY | Facility: HOSPITAL | Age: 81
Discharge: HOME | End: 2021-10-29
Attending: STUDENT IN AN ORGANIZED HEALTH CARE EDUCATION/TRAINING PROGRAM
Payer: MEDICARE

## 2021-10-29 VITALS
SYSTOLIC BLOOD PRESSURE: 120 MMHG | HEART RATE: 56 BPM | HEIGHT: 71 IN | BODY MASS INDEX: 30.94 KG/M2 | WEIGHT: 221 LBS | DIASTOLIC BLOOD PRESSURE: 72 MMHG

## 2021-10-29 DIAGNOSIS — I25.10 CORONARY ARTERY DISEASE INVOLVING NATIVE CORONARY ARTERY OF NATIVE HEART WITHOUT ANGINA PECTORIS: ICD-10-CM

## 2021-10-29 LAB
ASCENDING AORTA: 2.99 CM
AV INDEX (PROSTH): 0.85
AV MEAN GRADIENT: 3 MMHG
AV PEAK GRADIENT: 6 MMHG
AV VELOCITY RATIO: 0.78
BSA FOR ECHO PROCEDURE: 2.24 M2
CV ECHO LV RWT: 0.31 CM
DOP CALC AO PEAK VEL: 1.2 M/S
DOP CALC AO VTI: 25.98 CM
DOP CALC LVOT PEAK VEL: 0.94 M/S
DOP CALCLVOT PEAK VEL VTI: 21.98 CM
E WAVE DECELERATION TIME: 238.41 MSEC
E/A RATIO: 0.86
E/E' RATIO: 9.29 M/S
ECHO LV POSTERIOR WALL: 0.81 CM (ref 0.6–1.1)
EJECTION FRACTION: 60 %
FRACTIONAL SHORTENING: 35 % (ref 28–44)
INTERVENTRICULAR SEPTUM: 0.92 CM (ref 0.6–1.1)
LA MAJOR: 5.32 CM
LA MINOR: 5.36 CM
LA WIDTH: 4.2 CM
LEFT ATRIUM SIZE: 4.21 CM
LEFT ATRIUM VOLUME INDEX MOD: 24.1 ML/M2
LEFT ATRIUM VOLUME INDEX: 36.5 ML/M2
LEFT ATRIUM VOLUME MOD: 53.05 CM3
LEFT ATRIUM VOLUME: 80.26 CM3
LEFT INTERNAL DIMENSION IN SYSTOLE: 3.39 CM (ref 2.1–4)
LEFT VENTRICLE DIASTOLIC VOLUME INDEX: 58.85 ML/M2
LEFT VENTRICLE DIASTOLIC VOLUME: 129.46 ML
LEFT VENTRICLE MASS INDEX: 73 G/M2
LEFT VENTRICLE SYSTOLIC VOLUME INDEX: 21.4 ML/M2
LEFT VENTRICLE SYSTOLIC VOLUME: 47.08 ML
LEFT VENTRICULAR INTERNAL DIMENSION IN DIASTOLE: 5.2 CM (ref 3.5–6)
LEFT VENTRICULAR MASS: 160.51 G
LV LATERAL E/E' RATIO: 9.29 M/S
LV SEPTAL E/E' RATIO: 9.29 M/S
MV A" WAVE DURATION": 8.56 MSEC
MV PEAK A VEL: 0.76 M/S
MV PEAK E VEL: 0.65 M/S
MV STENOSIS PRESSURE HALF TIME: 69.14 MS
MV VALVE AREA P 1/2 METHOD: 3.18 CM2
PISA TR MAX VEL: 2.49 M/S
PULM VEIN S/D RATIO: 1.35
PV PEAK D VEL: 0.31 M/S
PV PEAK S VEL: 0.42 M/S
RA MAJOR: 5.23 CM
RA PRESSURE: 3 MMHG
RA WIDTH: 3.74 CM
RIGHT VENTRICULAR END-DIASTOLIC DIMENSION: 3.75 CM
RV TISSUE DOPPLER FREE WALL SYSTOLIC VELOCITY 1 (APICAL 4 CHAMBER VIEW): 10.41 CM/S
SINUS: 3.33 CM
STJ: 2.94 CM
TDI LATERAL: 0.07 M/S
TDI SEPTAL: 0.07 M/S
TDI: 0.07 M/S
TR MAX PG: 25 MMHG
TRICUSPID ANNULAR PLANE SYSTOLIC EXCURSION: 1.96 CM
TV REST PULMONARY ARTERY PRESSURE: 28 MMHG

## 2021-10-29 PROCEDURE — 93306 ECHO (CUPID ONLY): ICD-10-PCS | Mod: 26,,, | Performed by: INTERNAL MEDICINE

## 2021-10-29 PROCEDURE — 93306 TTE W/DOPPLER COMPLETE: CPT

## 2021-10-29 PROCEDURE — 93306 TTE W/DOPPLER COMPLETE: CPT | Mod: 26,,, | Performed by: INTERNAL MEDICINE

## 2021-11-06 ENCOUNTER — PATIENT OUTREACH (OUTPATIENT)
Dept: ADMINISTRATIVE | Facility: OTHER | Age: 81
End: 2021-11-06
Payer: MEDICARE

## 2021-11-09 ENCOUNTER — OFFICE VISIT (OUTPATIENT)
Dept: INTERNAL MEDICINE | Facility: CLINIC | Age: 81
End: 2021-11-09
Payer: MEDICARE

## 2021-11-09 ENCOUNTER — OFFICE VISIT (OUTPATIENT)
Dept: CARDIOLOGY | Facility: CLINIC | Age: 81
End: 2021-11-09
Payer: MEDICARE

## 2021-11-09 ENCOUNTER — PATIENT MESSAGE (OUTPATIENT)
Dept: INTERNAL MEDICINE | Facility: CLINIC | Age: 81
End: 2021-11-09

## 2021-11-09 ENCOUNTER — HOSPITAL ENCOUNTER (OUTPATIENT)
Dept: CARDIOLOGY | Facility: CLINIC | Age: 81
Discharge: HOME | End: 2021-11-09
Payer: MEDICARE

## 2021-11-09 VITALS
BODY MASS INDEX: 29.29 KG/M2 | DIASTOLIC BLOOD PRESSURE: 78 MMHG | HEART RATE: 61 BPM | WEIGHT: 209.19 LBS | SYSTOLIC BLOOD PRESSURE: 117 MMHG | HEIGHT: 71 IN

## 2021-11-09 VITALS
BODY MASS INDEX: 29.26 KG/M2 | DIASTOLIC BLOOD PRESSURE: 73 MMHG | WEIGHT: 209 LBS | HEIGHT: 71 IN | OXYGEN SATURATION: 95 % | HEART RATE: 60 BPM | SYSTOLIC BLOOD PRESSURE: 130 MMHG

## 2021-11-09 DIAGNOSIS — E78.5 HYPERLIPIDEMIA, UNSPECIFIED HYPERLIPIDEMIA TYPE: ICD-10-CM

## 2021-11-09 DIAGNOSIS — E78.2 MIXED HYPERLIPIDEMIA: Primary | ICD-10-CM

## 2021-11-09 DIAGNOSIS — L30.9 DERMATITIS: ICD-10-CM

## 2021-11-09 DIAGNOSIS — H20.9 UVEITIS-HYPHEMA-GLAUCOMA SYNDROME OF RIGHT EYE: ICD-10-CM

## 2021-11-09 DIAGNOSIS — G45.4 TRANSIENT GLOBAL AMNESIA: ICD-10-CM

## 2021-11-09 DIAGNOSIS — E66.09 CLASS 1 OBESITY DUE TO EXCESS CALORIES WITH SERIOUS COMORBIDITY AND BODY MASS INDEX (BMI) OF 30.0 TO 30.9 IN ADULT: ICD-10-CM

## 2021-11-09 DIAGNOSIS — I25.10 CORONARY ARTERY DISEASE INVOLVING NATIVE CORONARY ARTERY WITHOUT ANGINA PECTORIS, UNSPECIFIED WHETHER NATIVE OR TRANSPLANTED HEART: ICD-10-CM

## 2021-11-09 DIAGNOSIS — E78.2 MIXED HYPERLIPIDEMIA: ICD-10-CM

## 2021-11-09 DIAGNOSIS — N20.0 RECURRENT NEPHROLITHIASIS: ICD-10-CM

## 2021-11-09 DIAGNOSIS — H40.41X0 UVEITIS-HYPHEMA-GLAUCOMA SYNDROME OF RIGHT EYE: ICD-10-CM

## 2021-11-09 DIAGNOSIS — C67.9 MALIGNANT NEOPLASM OF URINARY BLADDER, UNSPECIFIED SITE: ICD-10-CM

## 2021-11-09 DIAGNOSIS — I25.10 CORONARY ARTERY DISEASE INVOLVING NATIVE CORONARY ARTERY OF NATIVE HEART WITHOUT ANGINA PECTORIS: Primary | ICD-10-CM

## 2021-11-09 DIAGNOSIS — Z12.11 COLON CANCER SCREENING: ICD-10-CM

## 2021-11-09 DIAGNOSIS — G47.00 INSOMNIA, UNSPECIFIED TYPE: ICD-10-CM

## 2021-11-09 DIAGNOSIS — I25.10 CORONARY ARTERY DISEASE INVOLVING NATIVE CORONARY ARTERY OF NATIVE HEART WITHOUT ANGINA PECTORIS: ICD-10-CM

## 2021-11-09 DIAGNOSIS — T85.398A UVEITIS-HYPHEMA-GLAUCOMA SYNDROME OF RIGHT EYE: ICD-10-CM

## 2021-11-09 PROCEDURE — 99213 OFFICE O/P EST LOW 20 MIN: CPT | Mod: PBBFAC | Performed by: INTERNAL MEDICINE

## 2021-11-09 PROCEDURE — 99213 PR OFFICE/OUTPT VISIT, EST, LEVL III, 20-29 MIN: ICD-10-PCS | Mod: S$PBB,,, | Performed by: INTERNAL MEDICINE

## 2021-11-09 PROCEDURE — 99999 PR PBB SHADOW E&M-EST. PATIENT-LVL III: CPT | Mod: PBBFAC,,, | Performed by: INTERNAL MEDICINE

## 2021-11-09 PROCEDURE — 99215 OFFICE O/P EST HI 40 MIN: CPT | Mod: S$PBB,,, | Performed by: INTERNAL MEDICINE

## 2021-11-09 PROCEDURE — 99213 OFFICE O/P EST LOW 20 MIN: CPT | Mod: PBBFAC,27 | Performed by: INTERNAL MEDICINE

## 2021-11-09 PROCEDURE — 99999 PR PBB SHADOW E&M-EST. PATIENT-LVL III: ICD-10-PCS | Mod: PBBFAC,,, | Performed by: INTERNAL MEDICINE

## 2021-11-09 PROCEDURE — 93005 ELECTROCARDIOGRAM TRACING: CPT | Mod: PBBFAC | Performed by: INTERNAL MEDICINE

## 2021-11-09 PROCEDURE — 99213 OFFICE O/P EST LOW 20 MIN: CPT | Mod: S$PBB,,, | Performed by: INTERNAL MEDICINE

## 2021-11-09 PROCEDURE — 99215 PR OFFICE/OUTPT VISIT, EST, LEVL V, 40-54 MIN: ICD-10-PCS | Mod: S$PBB,,, | Performed by: INTERNAL MEDICINE

## 2021-11-09 PROCEDURE — 93010 ELECTROCARDIOGRAM REPORT: CPT | Mod: S$PBB,,, | Performed by: INTERNAL MEDICINE

## 2021-11-09 PROCEDURE — 93010 EKG 12-LEAD: ICD-10-PCS | Mod: S$PBB,,, | Performed by: INTERNAL MEDICINE

## 2021-11-09 RX ORDER — RAMIPRIL 5 MG/1
5 CAPSULE ORAL DAILY
Qty: 90 CAPSULE | Refills: 3 | Status: SHIPPED | OUTPATIENT
Start: 2021-11-09 | End: 2022-11-08 | Stop reason: SDUPTHER

## 2021-11-09 RX ORDER — TRAZODONE HYDROCHLORIDE 50 MG/1
50 TABLET ORAL NIGHTLY PRN
Qty: 30 TABLET | Refills: 11 | Status: SHIPPED | OUTPATIENT
Start: 2021-11-09 | End: 2022-11-08

## 2021-11-09 RX ORDER — ESZOPICLONE 3 MG/1
3 TABLET, FILM COATED ORAL NIGHTLY
Qty: 90 TABLET | Refills: 3 | OUTPATIENT
Start: 2021-11-09

## 2021-11-09 RX ORDER — RAMIPRIL 5 MG/1
5 CAPSULE ORAL DAILY
Qty: 90 CAPSULE | Refills: 3 | OUTPATIENT
Start: 2021-11-09

## 2021-11-09 RX ORDER — PSEUDOEPHEDRINE HCL 120 MG/1
120 TABLET, FILM COATED, EXTENDED RELEASE ORAL
COMMUNITY

## 2021-11-17 ENCOUNTER — TELEPHONE (OUTPATIENT)
Dept: INTERNAL MEDICINE | Facility: CLINIC | Age: 81
End: 2021-11-17
Payer: MEDICARE

## 2021-11-17 DIAGNOSIS — G47.00 INSOMNIA, UNSPECIFIED TYPE: ICD-10-CM

## 2021-11-17 RX ORDER — ESZOPICLONE 3 MG/1
3 TABLET, FILM COATED ORAL NIGHTLY
Qty: 60 TABLET | Refills: 3 | Status: SHIPPED | OUTPATIENT
Start: 2021-11-17 | End: 2022-06-13 | Stop reason: SDUPTHER

## 2021-11-29 ENCOUNTER — PROCEDURE VISIT (OUTPATIENT)
Dept: DERMATOLOGY | Facility: CLINIC | Age: 81
End: 2021-11-29
Payer: MEDICARE

## 2021-11-29 VITALS
HEIGHT: 71 IN | SYSTOLIC BLOOD PRESSURE: 139 MMHG | BODY MASS INDEX: 29.26 KG/M2 | HEART RATE: 56 BPM | WEIGHT: 209 LBS | DIASTOLIC BLOOD PRESSURE: 88 MMHG

## 2021-11-29 DIAGNOSIS — C44.319 BASAL CELL CARCINOMA OF RIGHT CHEEK: Primary | ICD-10-CM

## 2021-11-29 PROCEDURE — 99499 NO LOS: ICD-10-PCS | Mod: S$PBB,,, | Performed by: DERMATOLOGY

## 2021-11-29 PROCEDURE — 99499 UNLISTED E&M SERVICE: CPT | Mod: S$PBB,,, | Performed by: DERMATOLOGY

## 2021-11-29 PROCEDURE — 17312 MOHS ADDL STAGE: CPT | Mod: S$PBB,,, | Performed by: DERMATOLOGY

## 2021-11-29 PROCEDURE — 13132 PR RECMPL WND HEAD,FAC,HAND 2.6-7.5 CM: ICD-10-PCS | Mod: S$PBB,51,, | Performed by: DERMATOLOGY

## 2021-11-29 PROCEDURE — 17312 MOHS ADDL STAGE: CPT | Mod: PBBFAC | Performed by: DERMATOLOGY

## 2021-11-29 PROCEDURE — 17311: ICD-10-PCS | Mod: S$PBB,,, | Performed by: DERMATOLOGY

## 2021-11-29 PROCEDURE — 13132 CMPLX RPR F/C/C/M/N/AX/G/H/F: CPT | Mod: PBBFAC | Performed by: DERMATOLOGY

## 2021-11-29 PROCEDURE — 17312: ICD-10-PCS | Mod: S$PBB,,, | Performed by: DERMATOLOGY

## 2021-11-29 PROCEDURE — 17311 MOHS 1 STAGE H/N/HF/G: CPT | Mod: S$PBB,,, | Performed by: DERMATOLOGY

## 2021-11-29 PROCEDURE — 17311 MOHS 1 STAGE H/N/HF/G: CPT | Mod: PBBFAC | Performed by: DERMATOLOGY

## 2021-11-29 PROCEDURE — 13132 CMPLX RPR F/C/C/M/N/AX/G/H/F: CPT | Mod: S$PBB,51,, | Performed by: DERMATOLOGY

## 2021-11-29 RX ORDER — CEPHALEXIN 500 MG/1
500 CAPSULE ORAL EVERY 8 HOURS
Qty: 21 CAPSULE | Refills: 0 | Status: SHIPPED | OUTPATIENT
Start: 2021-11-29 | End: 2021-12-06

## 2021-12-02 LAB — NONINV COLON CA DNA+OCC BLD SCRN STL QL: NEGATIVE

## 2021-12-06 ENCOUNTER — OFFICE VISIT (OUTPATIENT)
Dept: DERMATOLOGY | Facility: CLINIC | Age: 81
End: 2021-12-06
Payer: MEDICARE

## 2021-12-06 DIAGNOSIS — Z09 POSTOP CHECK: Primary | ICD-10-CM

## 2021-12-06 PROCEDURE — 99999 PR PBB SHADOW E&M-EST. PATIENT-LVL I: ICD-10-PCS | Mod: PBBFAC,,, | Performed by: DERMATOLOGY

## 2021-12-06 PROCEDURE — 99999 PR PBB SHADOW E&M-EST. PATIENT-LVL I: CPT | Mod: PBBFAC,,, | Performed by: DERMATOLOGY

## 2021-12-06 PROCEDURE — 99024 POSTOP FOLLOW-UP VISIT: CPT | Mod: POP,,, | Performed by: DERMATOLOGY

## 2021-12-06 PROCEDURE — 99211 OFF/OP EST MAY X REQ PHY/QHP: CPT | Mod: PBBFAC | Performed by: DERMATOLOGY

## 2021-12-06 PROCEDURE — 99024 PR POST-OP FOLLOW-UP VISIT: ICD-10-PCS | Mod: POP,,, | Performed by: DERMATOLOGY

## 2022-01-10 ENCOUNTER — PROCEDURE VISIT (OUTPATIENT)
Dept: DERMATOLOGY | Facility: CLINIC | Age: 82
End: 2022-01-10
Payer: MEDICARE

## 2022-01-10 VITALS
DIASTOLIC BLOOD PRESSURE: 85 MMHG | SYSTOLIC BLOOD PRESSURE: 155 MMHG | HEART RATE: 59 BPM | HEIGHT: 71 IN | WEIGHT: 209 LBS | BODY MASS INDEX: 29.26 KG/M2

## 2022-01-10 DIAGNOSIS — C44.319 BASAL CELL CARCINOMA OF LEFT CHEEK: Primary | ICD-10-CM

## 2022-01-10 PROCEDURE — 17311 MOHS 1 STAGE H/N/HF/G: CPT | Mod: PBBFAC | Performed by: DERMATOLOGY

## 2022-01-10 PROCEDURE — 99499 UNLISTED E&M SERVICE: CPT | Mod: S$PBB,,, | Performed by: DERMATOLOGY

## 2022-01-10 PROCEDURE — 17311: ICD-10-PCS | Mod: S$PBB,,, | Performed by: DERMATOLOGY

## 2022-01-10 PROCEDURE — 99499 NO LOS: ICD-10-PCS | Mod: S$PBB,,, | Performed by: DERMATOLOGY

## 2022-01-10 PROCEDURE — 13131 CMPLX RPR F/C/C/M/N/AX/G/H/F: CPT | Mod: S$PBB,51,, | Performed by: DERMATOLOGY

## 2022-01-10 PROCEDURE — 13131 PR RECMPL WND HEAD,FAC,HAND 1.1-2.5 CM: ICD-10-PCS | Mod: S$PBB,51,, | Performed by: DERMATOLOGY

## 2022-01-10 PROCEDURE — 13131 CMPLX RPR F/C/C/M/N/AX/G/H/F: CPT | Mod: PBBFAC,51 | Performed by: DERMATOLOGY

## 2022-01-10 PROCEDURE — 17311 MOHS 1 STAGE H/N/HF/G: CPT | Mod: S$PBB,,, | Performed by: DERMATOLOGY

## 2022-01-10 NOTE — PROGRESS NOTES
PROCEDURE: Mohs' Micrographic Surgery    INDICATION: Location in non-mask areas of face where maximum conservation of tumor-free tissue is needed. Biopsy-proven skin cancer of cosmetically and functionally important areas, including head, neck, genital, hand, foot, or areas known for having difficulty in healing, such as the lower anterior legs. Tumor with ill-defined borders.    REFERRING PROVIDER: Sandro Collins M.D.    CASE NUMBER:     ANESTHETIC: 2.5 cc 0.5% Lidocaine with Epi 1:200,000 mixed 1:1 with 0.5% Bupivacaine    SURGICAL PREP: Hibiclens    SURGEON: Juan Plasencia MD    ASSISTANTS: Lidia Mtz PA-C and Hannah Hastings Surg Tech    PREOPERATIVE DIAGNOSIS: basal cell carcinoma- nodular    POSTOPERATIVE DIAGNOSIS: basal cell carcinoma    PATHOLOGIC DIAGNOSIS: basal cell carcinoma- nodular    HISTOLOGY OF SPECIMENS IN FIRST STAGE:   Tumor Type: No tumor seen.    STAGES OF MOHS' SURGERY PERFORMED: 1    TUMOR-FREE PLANE ACHIEVED: Yes    HEMOSTASIS: electrocoagulation     SPECIMENS: 2    LOCATION: left superior lateral cheek bone. Location verified with Dr. Collins's clinical photograph. Patient also verified location with hand held mirror.    INITIAL LESION SIZE: 0.4 x 0.5 cm    FINAL DEFECT SIZE: 0.8 x 0.9 cm    WOUND REPAIR/DISPOSITION: The patient tolerated Mohs' Micrographic Surgery for a basal cell carcinoma very well. When the tumor was completely removed, a repair of the surgical defect was undertaken.       PROCEDURE: Complex Linear Repair    INDICATION: Status post Mohs' Micrographic Surgery for basal cell carcinoma.    CASE NUMBER:     SURGEON: Juan Plasencia MD    ASSISTANTS: Lidia Mtz PA-C and Hannah Hastings Surg Tech    ANESTHETIC: 2 cc 1% Lidocaine with Epinephrine 1:100,000    SURGICAL PREP: Hibiclens, prepped by An ESTEFANIA Mtz    LOCATION: left superior lateral cheek bone    DEFECT SIZE: 0.8 x 0.9 cm    WOUND REPAIR/DISPOSITION:  After the patient's carcinoma had been completely  "removed with Mohs' Micrographic Surgery, a repair of the surgical defect was undertaken. The patient was returned to the operating suite where the area of left superior lateral cheek bone was prepped, draped, and anesthetized in the usual sterile fashion. The wound was widely undermined in all directions. The wound was undermined to a distance at least the maximum width of the defect as measured perpendicular to the closure line along at least one entire edge of the defect, in this case 1 cm. Then, electrocoagulation was used to obtain meticulous hemostasis. 5-0 Vicryl buried vertical mattress sutures were placed into the subcutaneous and dermal plane to close the wound and kaela the cutaneous wound edge. Bilateral dog ears were identified and were removed by a standard Burow's triangle technique. The cutaneous wound edges were closed using interrupted 6-0 Prolene suture.    The patient tolerated the procedure well.    The area was cleaned and dressed appropriately and the patient was given wound care instructions, as well as appointment for follow-up evaluation and suture removal in 7 days.    LENGTH OF REPAIR: 2 cm    Vitals:    01/10/22 1244 01/10/22 1413   BP: 122/82 (!) 155/85   BP Location: Right arm    Patient Position: Sitting    BP Method: Large (Automatic)    Pulse: (!) 55 (!) 59   Weight: 94.8 kg (209 lb)    Height: 5' 11" (1.803 m)        "

## 2022-01-18 ENCOUNTER — OFFICE VISIT (OUTPATIENT)
Dept: DERMATOLOGY | Facility: CLINIC | Age: 82
End: 2022-01-18
Payer: MEDICARE

## 2022-01-18 DIAGNOSIS — Z09 POSTOP CHECK: Primary | ICD-10-CM

## 2022-01-18 PROCEDURE — 99024 PR POST-OP FOLLOW-UP VISIT: ICD-10-PCS | Mod: POP,,, | Performed by: DERMATOLOGY

## 2022-01-18 PROCEDURE — 99211 OFF/OP EST MAY X REQ PHY/QHP: CPT | Mod: PBBFAC | Performed by: DERMATOLOGY

## 2022-01-18 PROCEDURE — 99999 PR PBB SHADOW E&M-EST. PATIENT-LVL I: ICD-10-PCS | Mod: PBBFAC,,, | Performed by: DERMATOLOGY

## 2022-01-18 PROCEDURE — 99024 POSTOP FOLLOW-UP VISIT: CPT | Mod: POP,,, | Performed by: DERMATOLOGY

## 2022-01-18 PROCEDURE — 99999 PR PBB SHADOW E&M-EST. PATIENT-LVL I: CPT | Mod: PBBFAC,,, | Performed by: DERMATOLOGY

## 2022-01-19 NOTE — PROGRESS NOTES
81 y.o. male patient is here for suture removal following Mohs' surgery.    Patient reports no problems.    WOUND PE:  The left superior lateral cheek sutures intact. Wound healing well. Good skin edges. No signs or symptoms of infection.      IMPRESSION:  Healing operative site from Mohs' surgery BCC left superior lateral cheek s/p Mohs with CLC, postop day # 8    PLAN:  Sutures removed today by Dr Plasencia. Steri-strips applied.  Continue wound care.  Keep moist with Aquaphor.  Call if any issues arise    RTC:  In 3 months with Dr Collins for skin check or sooner if new concern arises.

## 2022-01-31 ENCOUNTER — TELEPHONE (OUTPATIENT)
Dept: INTERNAL MEDICINE | Facility: CLINIC | Age: 82
End: 2022-01-31
Payer: MEDICARE

## 2022-01-31 DIAGNOSIS — D89.2 PARAPROTEINEMIA: Primary | ICD-10-CM

## 2022-02-02 ENCOUNTER — LAB VISIT (OUTPATIENT)
Dept: LAB | Facility: HOSPITAL | Age: 82
End: 2022-02-02
Payer: MEDICARE

## 2022-02-02 DIAGNOSIS — D89.2 PARAPROTEINEMIA: ICD-10-CM

## 2022-02-02 LAB
ALBUMIN SERPL BCP-MCNC: 3.6 G/DL (ref 3.5–5.2)
ALP SERPL-CCNC: 37 U/L (ref 55–135)
ALT SERPL W/O P-5'-P-CCNC: 14 U/L (ref 10–44)
ANA PATTERN 1: NORMAL
ANA SER QL IF: POSITIVE
ANA TITR SER IF: NORMAL {TITER}
ANION GAP SERPL CALC-SCNC: 5 MMOL/L (ref 8–16)
AST SERPL-CCNC: 16 U/L (ref 10–40)
BASOPHILS # BLD AUTO: 0.07 K/UL (ref 0–0.2)
BASOPHILS NFR BLD: 1 % (ref 0–1.9)
BILIRUB SERPL-MCNC: 0.5 MG/DL (ref 0.1–1)
BUN SERPL-MCNC: 17 MG/DL (ref 8–23)
CALCIUM SERPL-MCNC: 9.3 MG/DL (ref 8.7–10.5)
CHLORIDE SERPL-SCNC: 107 MMOL/L (ref 95–110)
CO2 SERPL-SCNC: 24 MMOL/L (ref 23–29)
CREAT SERPL-MCNC: 1 MG/DL (ref 0.5–1.4)
DIFFERENTIAL METHOD: ABNORMAL
EOSINOPHIL # BLD AUTO: 0.4 K/UL (ref 0–0.5)
EOSINOPHIL NFR BLD: 5.1 % (ref 0–8)
ERYTHROCYTE [DISTWIDTH] IN BLOOD BY AUTOMATED COUNT: 13.2 % (ref 11.5–14.5)
EST. GFR  (AFRICAN AMERICAN): >60 ML/MIN/1.73 M^2
EST. GFR  (NON AFRICAN AMERICAN): >60 ML/MIN/1.73 M^2
GLUCOSE SERPL-MCNC: 112 MG/DL (ref 70–110)
HCT VFR BLD AUTO: 41.6 % (ref 40–54)
HGB BLD-MCNC: 13.8 G/DL (ref 14–18)
IGA SERPL-MCNC: 189 MG/DL (ref 40–350)
IGG SERPL-MCNC: 877 MG/DL (ref 650–1600)
IGM SERPL-MCNC: 53 MG/DL (ref 50–300)
IMM GRANULOCYTES # BLD AUTO: 0.02 K/UL (ref 0–0.04)
IMM GRANULOCYTES NFR BLD AUTO: 0.3 % (ref 0–0.5)
LDH SERPL L TO P-CCNC: 163 U/L (ref 110–260)
LYMPHOCYTES # BLD AUTO: 1.2 K/UL (ref 1–4.8)
LYMPHOCYTES NFR BLD: 17.6 % (ref 18–48)
MCH RBC QN AUTO: 31 PG (ref 27–31)
MCHC RBC AUTO-ENTMCNC: 33.2 G/DL (ref 32–36)
MCV RBC AUTO: 94 FL (ref 82–98)
MONOCYTES # BLD AUTO: 0.6 K/UL (ref 0.3–1)
MONOCYTES NFR BLD: 8.7 % (ref 4–15)
NEUTROPHILS # BLD AUTO: 4.7 K/UL (ref 1.8–7.7)
NEUTROPHILS NFR BLD: 67.3 % (ref 38–73)
NRBC BLD-RTO: 0 /100 WBC
PLATELET # BLD AUTO: 200 K/UL (ref 150–450)
PMV BLD AUTO: 10.8 FL (ref 9.2–12.9)
POTASSIUM SERPL-SCNC: 4.2 MMOL/L (ref 3.5–5.1)
PROT SERPL-MCNC: 6.5 G/DL (ref 6–8.4)
RBC # BLD AUTO: 4.45 M/UL (ref 4.6–6.2)
SODIUM SERPL-SCNC: 136 MMOL/L (ref 136–145)
WBC # BLD AUTO: 6.92 K/UL (ref 3.9–12.7)

## 2022-02-02 PROCEDURE — 84165 PROTEIN E-PHORESIS SERUM: CPT | Mod: 26,,, | Performed by: PATHOLOGY

## 2022-02-02 PROCEDURE — 86038 ANTINUCLEAR ANTIBODIES: CPT | Performed by: INTERNAL MEDICINE

## 2022-02-02 PROCEDURE — 87340 HEPATITIS B SURFACE AG IA: CPT | Performed by: INTERNAL MEDICINE

## 2022-02-02 PROCEDURE — 86334 PATHOLOGIST INTERPRETATION IFE: ICD-10-PCS | Mod: 26,,, | Performed by: PATHOLOGY

## 2022-02-02 PROCEDURE — 86334 IMMUNOFIX E-PHORESIS SERUM: CPT | Performed by: INTERNAL MEDICINE

## 2022-02-02 PROCEDURE — 86706 HEP B SURFACE ANTIBODY: CPT | Performed by: INTERNAL MEDICINE

## 2022-02-02 PROCEDURE — 83615 LACTATE (LD) (LDH) ENZYME: CPT | Performed by: INTERNAL MEDICINE

## 2022-02-02 PROCEDURE — 85025 COMPLETE CBC W/AUTO DIFF WBC: CPT | Performed by: INTERNAL MEDICINE

## 2022-02-02 PROCEDURE — 83520 IMMUNOASSAY QUANT NOS NONAB: CPT | Performed by: INTERNAL MEDICINE

## 2022-02-02 PROCEDURE — 86039 ANTINUCLEAR ANTIBODIES (ANA): CPT | Performed by: INTERNAL MEDICINE

## 2022-02-02 PROCEDURE — 36415 COLL VENOUS BLD VENIPUNCTURE: CPT | Performed by: INTERNAL MEDICINE

## 2022-02-02 PROCEDURE — 84165 PATHOLOGIST INTERPRETATION SPE: ICD-10-PCS | Mod: 26,,, | Performed by: PATHOLOGY

## 2022-02-02 PROCEDURE — 86334 IMMUNOFIX E-PHORESIS SERUM: CPT | Mod: 26,,, | Performed by: PATHOLOGY

## 2022-02-02 PROCEDURE — 82784 ASSAY IGA/IGD/IGG/IGM EACH: CPT | Performed by: INTERNAL MEDICINE

## 2022-02-02 PROCEDURE — 80053 COMPREHEN METABOLIC PANEL: CPT | Performed by: INTERNAL MEDICINE

## 2022-02-02 PROCEDURE — 84165 PROTEIN E-PHORESIS SERUM: CPT | Performed by: INTERNAL MEDICINE

## 2022-02-02 PROCEDURE — 86235 NUCLEAR ANTIGEN ANTIBODY: CPT | Mod: 59 | Performed by: INTERNAL MEDICINE

## 2022-02-02 PROCEDURE — 86803 HEPATITIS C AB TEST: CPT | Performed by: INTERNAL MEDICINE

## 2022-02-03 ENCOUNTER — PATIENT MESSAGE (OUTPATIENT)
Dept: INTERNAL MEDICINE | Facility: CLINIC | Age: 82
End: 2022-02-03
Payer: MEDICARE

## 2022-02-03 LAB
ALBUMIN SERPL ELPH-MCNC: 3.79 G/DL (ref 3.35–5.55)
ALPHA1 GLOB SERPL ELPH-MCNC: 0.29 G/DL (ref 0.17–0.41)
ALPHA2 GLOB SERPL ELPH-MCNC: 0.67 G/DL (ref 0.43–0.99)
ANTI SM ANTIBODY: 0.09 RATIO (ref 0–0.99)
ANTI SM/RNP ANTIBODY: 0.28 RATIO (ref 0–0.99)
ANTI-SM INTERPRETATION: NEGATIVE
ANTI-SM/RNP INTERPRETATION: NEGATIVE
ANTI-SSA ANTIBODY: 0.05 RATIO (ref 0–0.99)
ANTI-SSA INTERPRETATION: NEGATIVE
ANTI-SSB ANTIBODY: 0.08 RATIO (ref 0–0.99)
ANTI-SSB INTERPRETATION: NEGATIVE
B-GLOBULIN SERPL ELPH-MCNC: 0.68 G/DL (ref 0.5–1.1)
DSDNA AB SER-ACNC: NORMAL [IU]/ML
GAMMA GLOB SERPL ELPH-MCNC: 0.87 G/DL (ref 0.67–1.58)
HBV SURFACE AB SER-ACNC: NEGATIVE M[IU]/ML
HBV SURFACE AG SERPL QL IA: NEGATIVE
HCV AB SERPL QL IA: NEGATIVE
INTERPRETATION SERPL IFE-IMP: NORMAL
KAPPA LC SER QL IA: 2.5 MG/DL (ref 0.33–1.94)
KAPPA LC/LAMBDA SER IA: 1.22 (ref 0.26–1.65)
LAMBDA LC SER QL IA: 2.05 MG/DL (ref 0.57–2.63)
PATHOLOGIST INTERPRETATION IFE: NORMAL
PATHOLOGIST INTERPRETATION SPE: NORMAL
PROT SERPL-MCNC: 6.3 G/DL (ref 6–8.4)

## 2022-06-13 ENCOUNTER — TELEPHONE (OUTPATIENT)
Dept: INTERNAL MEDICINE | Facility: CLINIC | Age: 82
End: 2022-06-13
Payer: MEDICARE

## 2022-06-13 DIAGNOSIS — G47.00 INSOMNIA, UNSPECIFIED TYPE: ICD-10-CM

## 2022-06-13 RX ORDER — ESZOPICLONE 3 MG/1
3 TABLET, FILM COATED ORAL NIGHTLY
Qty: 60 TABLET | Refills: 3 | Status: SHIPPED | OUTPATIENT
Start: 2022-06-13 | End: 2022-12-13 | Stop reason: SDUPTHER

## 2022-06-22 ENCOUNTER — TELEPHONE (OUTPATIENT)
Dept: INTERNAL MEDICINE | Facility: CLINIC | Age: 82
End: 2022-06-22
Payer: MEDICARE

## 2022-06-22 DIAGNOSIS — R30.0 DYSURIA: Primary | ICD-10-CM

## 2022-09-08 ENCOUNTER — TELEPHONE (OUTPATIENT)
Dept: INTERNAL MEDICINE | Facility: CLINIC | Age: 82
End: 2022-09-08
Payer: MEDICARE

## 2022-09-08 DIAGNOSIS — I25.10 CORONARY ARTERY DISEASE INVOLVING NATIVE CORONARY ARTERY OF NATIVE HEART WITHOUT ANGINA PECTORIS: Primary | ICD-10-CM

## 2022-09-08 DIAGNOSIS — E88.09 DYSPROTEINEMIA: Primary | ICD-10-CM

## 2022-09-08 NOTE — TELEPHONE ENCOUNTER
Skin bx by Dr. Collins, outside derm, showed rouleaux formation. I discussed with Hermelindo Johnson who suggested dysproteinemia workup

## 2022-09-12 ENCOUNTER — LAB VISIT (OUTPATIENT)
Dept: LAB | Facility: HOSPITAL | Age: 82
End: 2022-09-12
Attending: INTERNAL MEDICINE
Payer: MEDICARE

## 2022-09-12 ENCOUNTER — TELEPHONE (OUTPATIENT)
Dept: INTERNAL MEDICINE | Facility: CLINIC | Age: 82
End: 2022-09-12
Payer: MEDICARE

## 2022-09-12 DIAGNOSIS — I25.10 CORONARY ARTERY DISEASE INVOLVING NATIVE CORONARY ARTERY OF NATIVE HEART WITHOUT ANGINA PECTORIS: Primary | ICD-10-CM

## 2022-09-12 DIAGNOSIS — Z12.5 PROSTATE CANCER SCREENING: ICD-10-CM

## 2022-09-12 DIAGNOSIS — E88.09 DYSPROTEINEMIA: ICD-10-CM

## 2022-09-12 DIAGNOSIS — E78.2 MIXED HYPERLIPIDEMIA: ICD-10-CM

## 2022-09-12 PROCEDURE — 84165 PROTEIN E-PHORESIS SERUM: CPT | Performed by: INTERNAL MEDICINE

## 2022-09-12 PROCEDURE — 86334 IMMUNOFIX E-PHORESIS SERUM: CPT | Performed by: INTERNAL MEDICINE

## 2022-09-12 PROCEDURE — 86334 PATHOLOGIST INTERPRETATION IFE: ICD-10-PCS | Mod: 26,,, | Performed by: PATHOLOGY

## 2022-09-12 PROCEDURE — 86334 IMMUNOFIX E-PHORESIS SERUM: CPT | Mod: 26,,, | Performed by: PATHOLOGY

## 2022-09-12 PROCEDURE — 84165 PATHOLOGIST INTERPRETATION SPE: ICD-10-PCS | Mod: 26,,, | Performed by: PATHOLOGY

## 2022-09-12 PROCEDURE — 84165 PROTEIN E-PHORESIS SERUM: CPT | Mod: 26,,, | Performed by: PATHOLOGY

## 2022-09-12 PROCEDURE — 83520 IMMUNOASSAY QUANT NOS NONAB: CPT | Performed by: INTERNAL MEDICINE

## 2022-09-12 PROCEDURE — 82595 ASSAY OF CRYOGLOBULIN: CPT | Performed by: INTERNAL MEDICINE

## 2022-09-12 NOTE — TELEPHONE ENCOUNTER
----- Message from Alexandrea Guan MA sent at 9/12/2022 10:10 AM CDT -----  Please add orders for his annual

## 2022-09-13 LAB
ALBUMIN SERPL ELPH-MCNC: 4.38 G/DL (ref 3.35–5.55)
ALPHA1 GLOB SERPL ELPH-MCNC: 0.34 G/DL (ref 0.17–0.41)
ALPHA2 GLOB SERPL ELPH-MCNC: 0.69 G/DL (ref 0.43–0.99)
B-GLOBULIN SERPL ELPH-MCNC: 0.81 G/DL (ref 0.5–1.1)
GAMMA GLOB SERPL ELPH-MCNC: 0.97 G/DL (ref 0.67–1.58)
INTERPRETATION SERPL IFE-IMP: NORMAL
KAPPA LC SER QL IA: 2.57 MG/DL (ref 0.33–1.94)
KAPPA LC/LAMBDA SER IA: 1.29 (ref 0.26–1.65)
LAMBDA LC SER QL IA: 1.99 MG/DL (ref 0.57–2.63)
PROT SERPL-MCNC: 7.2 G/DL (ref 6–8.4)

## 2022-09-14 LAB
PATHOLOGIST INTERPRETATION IFE: NORMAL
PATHOLOGIST INTERPRETATION SPE: NORMAL

## 2022-09-22 LAB — CRYOGLOB SER QL: NORMAL

## 2022-11-02 NOTE — PROGRESS NOTES
Subjective:   Patient ID:  Gurjit Valentin is a 82 y.o. male who presents for follow up of Coronary Artery Disease and Hyperlipidemia      Assessment:     1. CAD without angina: Known 50% LAD stenosis: nl EF, non-obst CAD by cath    2. Mixed hyperlipidemia: LDL at goal on meds.   3. Class 2 severe obesity due to excess calories with serious comorbidity in adult, unspecified BMI    4. DELVIN (obstructive sleep apnea)        Plan:     Cont meds and resume exercise  RTC 1 yr with FLP, BMP, and ECG.     HPI: Feels well. Not exercising regularly but no limitations of activity. No angina or heart failure symptoms.    10- TTE  The left ventricle is normal in size with normal systolic function. The estimated ejection fraction is 60%.  Normal right ventricular size with normal right ventricular systolic function.  Indeterminate left ventricular diastolic function.  Mild left atrial enlargement.  The estimated PA systolic pressure is 28 mmHg.  Normal central venous pressure (3 mmHg).      TTE  The left ventricle is normal in size with normal systolic function. The estimated ejection fraction is 60%.  Normal right ventricular systolic function.  Normal left ventricular diastolic function.      Echo Stress  Normal left ventricular systolic function. The estimated ejection fraction is 65%  Normal LV diastolic function.  No wall motion abnormalities.  Normal right ventricular systolic function.  The estimated PA systolic pressure is 31 mm Hg  Normal central venous pressure (3 mm Hg).  The stress echo portion of this study is negative for myocardial ischemia. Target heart rate was achieved.  The EKG portion of this study is positive for myocardial ischemia.  Although the EKG response to exercise was positive, there was above average exercise capacity and no angina elicited. This is unchanged from prior studies.  Arrhythmias during stress: rare PVCs.  The patient's exercise capacity was above average. Achieved 12  METs.  The test was stopped because the patient experienced shortness of breath.    Review of Systems   Cardiovascular:  Negative for chest pain, claudication, cyanosis, dyspnea on exertion, irregular heartbeat, leg swelling, near-syncope, orthopnea, palpitations, paroxysmal nocturnal dyspnea and syncope.   Respiratory:  Negative for shortness of breath.         Post nasal drip   Neurological:  Negative for brief paralysis, dizziness and focal weakness.     Past Medical History:   Diagnosis Date    Allergy     Basal cell carcinoma     Cancer     bladder    Cancer     skin     Cataract     Coronary artery disease     Hyperlipidemia     Hypertension     Increased prostate specific antigen (PSA) velocity 10/15/2015    Kidney stone     Recurrent nephrolithiasis 10/6/2014    Sleep apnea        Past Surgical History:   Procedure Laterality Date    BLADDER SURGERY      CARDIAC CATHETERIZATION      COLONOSCOPY N/A 2017    Procedure: COLONOSCOPY;  Surgeon: Kenneth Spicer MD;  Location: [address];  Service: Endoscopy;  Laterality: N/A;    CYSTOSCOPY      EYE SURGERY      bilateral    KIDNEY STONE SURGERY      LITHOTRIPSY      TONSILLECTOMY      at age 4       Social History     Tobacco Use    Smoking status: Former     Packs/day: 0.25     Years: 20.00     Pack years: 5.00     Types: Cigarettes     Quit date: 1988     Years since quittin.8    Smokeless tobacco: Never    Tobacco comments:     STOPPED 30 YRS AGO.   Substance Use Topics    Alcohol use: Yes     Alcohol/week: 9.8 standard drinks     Types: 9 Glasses of wine, 1 Standard drinks or equivalent per week     Comment: weekly    Drug use: No       Family History   Problem Relation Age of Onset    Hypertension Mother     Hypertension Brother     Heart attack Brother     No Known Problems Father     No Known Problems Maternal Grandmother     No Known Problems Maternal Grandfather     No Known Problems Paternal Grandmother   "   No Known Problems Paternal Grandfather     No Known Problems Sister     No Known Problems Maternal Aunt     No Known Problems Maternal Uncle     No Known Problems Paternal Aunt     No Known Problems Paternal Uncle     Colon polyps Neg Hx     Cancer Neg Hx     Heart disease Neg Hx     Anemia Neg Hx     Arrhythmia Neg Hx     Asthma Neg Hx     Clotting disorder Neg Hx     Fainting Neg Hx     Heart failure Neg Hx     Hyperlipidemia Neg Hx        Current Outpatient Medications   Medication Sig    desonide (DESOWEN) 0.05 % cream Apply 1 application topically Daily.    eszopiclone (LUNESTA) 3 mg Tab Take 1 tablet (3 mg total) by mouth every evening.    loratadine (CLARITIN) 10 mg tablet Take 10 mg by mouth daily as needed.    omega-3 fatty acids 300 mg Cap Take 300 mg by mouth.    pseudoephedrine (SUDAFED) 120 mg 12 hr tablet Take 120 mg by mouth every 12 (twelve) hours.    ramipriL (ALTACE) 5 MG capsule Take 1 capsule (5 mg total) by mouth once daily.    rosuvastatin (CRESTOR) 20 MG tablet Take 1 tablet (20 mg total) by mouth once daily.    traZODone (DESYREL) 50 MG tablet Take 1 tablet (50 mg total) by mouth nightly as needed for Insomnia.     No current facility-administered medications for this visit.       Review of patient's allergies indicates:  No Known Allergies    Objective:     /68 (BP Location: Left arm, Patient Position: Sitting, BP Method: Large (Automatic))   Pulse 64   Ht 5' 11" (1.803 m)   Wt 95.3 kg (210 lb 1.6 oz)   SpO2 (!) 93%   BMI 29.30 kg/m²     Physical Exam  Vitals and nursing note reviewed.   Constitutional:       Appearance: He is well-developed.   HENT:      Head: Normocephalic and atraumatic.   Eyes:      Conjunctiva/sclera: Conjunctivae normal.      Pupils: Pupils are equal, round, and reactive to light.   Neck:      Thyroid: No thyromegaly.   Cardiovascular:      Rate and Rhythm: Normal rate and regular rhythm.      Pulses: Normal pulses.      Heart " sounds: Normal heart sounds. No murmur heard.    No friction rub. No gallop.   Pulmonary:      Effort: Pulmonary effort is normal. No respiratory distress.      Breath sounds: Normal breath sounds. No wheezing or rales.   Chest:      Chest wall: No tenderness.   Abdominal:      General: Bowel sounds are normal. There is no distension.      Palpations: Abdomen is soft.      Tenderness: There is no abdominal tenderness.   Musculoskeletal:         General: Normal range of motion.      Cervical back: Normal range of motion and neck supple.   Skin:     General: Skin is warm and dry.   Neurological:      Mental Status: He is alert and oriented to person, place, and time.      Cranial Nerves: No cranial nerve deficit.      Coordination: Coordination normal.      Deep Tendon Reflexes: Reflexes are normal and symmetric.   Psychiatric:         Behavior: Behavior normal.         Thought Content: Thought content normal.         Judgment: Judgment normal.       Chemistry        Component Value Date/Time     11/04/2022 0810    K 4.6 11/04/2022 0810     11/04/2022 0810    CO2 24 11/04/2022 0810    BUN 16 11/04/2022 0810    CREATININE 1.0 11/04/2022 0810     (H) 11/04/2022 0810        Component Value Date/Time    CALCIUM 9.1 11/04/2022 0810    ALKPHOS 34 (L) 11/04/2022 0810    AST 17 11/04/2022 0810    ALT 13 11/04/2022 0810    BILITOT 0.6 11/04/2022 0810    ESTGFRAFRICA >60.0 02/02/2022 0721    EGFRNONAA >60.0 02/02/2022 0721            Lab Results   Component Value Date    CHOL 146 11/04/2022    CHOL 146 11/04/2022    CHOL 151 10/29/2021    CHOL 151 10/29/2021     Lab Results   Component Value Date    HDL 66 11/04/2022    HDL 66 11/04/2022    HDL 80 (H) 10/29/2021    HDL 80 (H) 10/29/2021     Lab Results   Component Value Date    LDLCALC 66.4 11/04/2022    LDLCALC 66.4 11/04/2022    LDLCALC 57.8 (L) 10/29/2021    LDLCALC 57.8 (L) 10/29/2021     Lab Results   Component Value Date    TRIG 68 11/04/2022    TRIG 68  11/04/2022    TRIG 66 10/29/2021    TRIG 66 10/29/2021     Lab Results   Component Value Date    CHOLHDL 45.2 11/04/2022    CHOLHDL 45.2 11/04/2022    CHOLHDL 53.0 (H) 10/29/2021    CHOLHDL 53.0 (H) 10/29/2021         Lab Results   Component Value Date     11/04/2022    K 4.6 11/04/2022     11/04/2022    CO2 24 11/04/2022    BUN 16 11/04/2022    CREATININE 1.0 11/04/2022     (H) 11/04/2022    HGBA1C 5.8 (H) 09/20/2017    AST 17 11/04/2022    ALT 13 11/04/2022    ALBUMIN 3.8 11/04/2022    PROT 6.8 11/04/2022    BILITOT 0.6 11/04/2022    WBC 6.15 11/04/2022    HGB 14.7 11/04/2022    HCT 43.3 11/04/2022    MCV 95 11/04/2022     11/04/2022    INR 1.0 06/01/2010    PSA 2.0 10/29/2021    TSH 1.064 02/25/2013    CHOL 146 11/04/2022    CHOL 146 11/04/2022    HDL 66 11/04/2022    HDL 66 11/04/2022    LDLCALC 66.4 11/04/2022    LDLCALC 66.4 11/04/2022    TRIG 68 11/04/2022    TRIG 68 11/04/2022

## 2022-11-04 ENCOUNTER — LAB VISIT (OUTPATIENT)
Dept: LAB | Facility: HOSPITAL | Age: 82
End: 2022-11-04
Attending: INTERNAL MEDICINE
Payer: MEDICARE

## 2022-11-04 DIAGNOSIS — E78.2 MIXED HYPERLIPIDEMIA: ICD-10-CM

## 2022-11-04 DIAGNOSIS — R30.0 DYSURIA: ICD-10-CM

## 2022-11-04 DIAGNOSIS — I25.10 CORONARY ARTERY DISEASE INVOLVING NATIVE CORONARY ARTERY OF NATIVE HEART WITHOUT ANGINA PECTORIS: ICD-10-CM

## 2022-11-04 LAB
ALBUMIN SERPL BCP-MCNC: 3.8 G/DL (ref 3.5–5.2)
ALP SERPL-CCNC: 34 U/L (ref 55–135)
ALT SERPL W/O P-5'-P-CCNC: 13 U/L (ref 10–44)
ANION GAP SERPL CALC-SCNC: 7 MMOL/L (ref 8–16)
AST SERPL-CCNC: 17 U/L (ref 10–40)
BASOPHILS # BLD AUTO: 0.07 K/UL (ref 0–0.2)
BASOPHILS NFR BLD: 1.1 % (ref 0–1.9)
BILIRUB SERPL-MCNC: 0.6 MG/DL (ref 0.1–1)
BILIRUB UR QL STRIP: NEGATIVE
BUN SERPL-MCNC: 16 MG/DL (ref 8–23)
CALCIUM SERPL-MCNC: 9.1 MG/DL (ref 8.7–10.5)
CHLORIDE SERPL-SCNC: 107 MMOL/L (ref 95–110)
CHOLEST SERPL-MCNC: 146 MG/DL (ref 120–199)
CHOLEST SERPL-MCNC: 146 MG/DL (ref 120–199)
CHOLEST/HDLC SERPL: 2.2 {RATIO} (ref 2–5)
CHOLEST/HDLC SERPL: 2.2 {RATIO} (ref 2–5)
CLARITY UR REFRACT.AUTO: CLEAR
CO2 SERPL-SCNC: 24 MMOL/L (ref 23–29)
COLOR UR AUTO: YELLOW
CREAT SERPL-MCNC: 1 MG/DL (ref 0.5–1.4)
DIFFERENTIAL METHOD: ABNORMAL
EOSINOPHIL # BLD AUTO: 0.3 K/UL (ref 0–0.5)
EOSINOPHIL NFR BLD: 5.5 % (ref 0–8)
ERYTHROCYTE [DISTWIDTH] IN BLOOD BY AUTOMATED COUNT: 13.4 % (ref 11.5–14.5)
ERYTHROCYTE [SEDIMENTATION RATE] IN BLOOD BY PHOTOMETRIC METHOD: 9 MM/HR (ref 0–23)
EST. GFR  (NO RACE VARIABLE): >60 ML/MIN/1.73 M^2
GLUCOSE SERPL-MCNC: 112 MG/DL (ref 70–110)
GLUCOSE UR QL STRIP: NEGATIVE
HCT VFR BLD AUTO: 43.3 % (ref 40–54)
HDLC SERPL-MCNC: 66 MG/DL (ref 40–75)
HDLC SERPL-MCNC: 66 MG/DL (ref 40–75)
HDLC SERPL: 45.2 % (ref 20–50)
HDLC SERPL: 45.2 % (ref 20–50)
HGB BLD-MCNC: 14.7 G/DL (ref 14–18)
HGB UR QL STRIP: NEGATIVE
IMM GRANULOCYTES # BLD AUTO: 0.01 K/UL (ref 0–0.04)
IMM GRANULOCYTES NFR BLD AUTO: 0.2 % (ref 0–0.5)
KETONES UR QL STRIP: NEGATIVE
LDLC SERPL CALC-MCNC: 66.4 MG/DL (ref 63–159)
LDLC SERPL CALC-MCNC: 66.4 MG/DL (ref 63–159)
LEUKOCYTE ESTERASE UR QL STRIP: NEGATIVE
LYMPHOCYTES # BLD AUTO: 1.2 K/UL (ref 1–4.8)
LYMPHOCYTES NFR BLD: 19.8 % (ref 18–48)
MCH RBC QN AUTO: 32.2 PG (ref 27–31)
MCHC RBC AUTO-ENTMCNC: 33.9 G/DL (ref 32–36)
MCV RBC AUTO: 95 FL (ref 82–98)
MONOCYTES # BLD AUTO: 0.7 K/UL (ref 0.3–1)
MONOCYTES NFR BLD: 11.1 % (ref 4–15)
NEUTROPHILS # BLD AUTO: 3.8 K/UL (ref 1.8–7.7)
NEUTROPHILS NFR BLD: 62.3 % (ref 38–73)
NITRITE UR QL STRIP: NEGATIVE
NONHDLC SERPL-MCNC: 80 MG/DL
NONHDLC SERPL-MCNC: 80 MG/DL
NRBC BLD-RTO: 0 /100 WBC
PH UR STRIP: 6 [PH] (ref 5–8)
PLATELET # BLD AUTO: 216 K/UL (ref 150–450)
PMV BLD AUTO: 10.9 FL (ref 9.2–12.9)
POTASSIUM SERPL-SCNC: 4.6 MMOL/L (ref 3.5–5.1)
PROT SERPL-MCNC: 6.8 G/DL (ref 6–8.4)
PROT UR QL STRIP: NEGATIVE
RBC # BLD AUTO: 4.57 M/UL (ref 4.6–6.2)
SODIUM SERPL-SCNC: 138 MMOL/L (ref 136–145)
SP GR UR STRIP: 1.01 (ref 1–1.03)
TRIGL SERPL-MCNC: 68 MG/DL (ref 30–150)
TRIGL SERPL-MCNC: 68 MG/DL (ref 30–150)
URN SPEC COLLECT METH UR: NORMAL
WBC # BLD AUTO: 6.15 K/UL (ref 3.9–12.7)

## 2022-11-04 PROCEDURE — 36415 COLL VENOUS BLD VENIPUNCTURE: CPT | Mod: PN | Performed by: INTERNAL MEDICINE

## 2022-11-04 PROCEDURE — 80053 COMPREHEN METABOLIC PANEL: CPT | Performed by: INTERNAL MEDICINE

## 2022-11-04 PROCEDURE — 85025 COMPLETE CBC W/AUTO DIFF WBC: CPT | Performed by: INTERNAL MEDICINE

## 2022-11-04 PROCEDURE — 85652 RBC SED RATE AUTOMATED: CPT | Performed by: INTERNAL MEDICINE

## 2022-11-04 PROCEDURE — 81003 URINALYSIS AUTO W/O SCOPE: CPT | Performed by: INTERNAL MEDICINE

## 2022-11-04 PROCEDURE — 80061 LIPID PANEL: CPT | Performed by: INTERNAL MEDICINE

## 2022-11-08 ENCOUNTER — HOSPITAL ENCOUNTER (OUTPATIENT)
Dept: CARDIOLOGY | Facility: CLINIC | Age: 82
Discharge: HOME | End: 2022-11-08
Payer: MEDICARE

## 2022-11-08 ENCOUNTER — OFFICE VISIT (OUTPATIENT)
Dept: INTERNAL MEDICINE | Facility: CLINIC | Age: 82
End: 2022-11-08
Payer: MEDICARE

## 2022-11-08 ENCOUNTER — PATIENT MESSAGE (OUTPATIENT)
Dept: INTERNAL MEDICINE | Facility: CLINIC | Age: 82
End: 2022-11-08

## 2022-11-08 ENCOUNTER — OFFICE VISIT (OUTPATIENT)
Dept: CARDIOLOGY | Facility: CLINIC | Age: 82
End: 2022-11-08
Payer: MEDICARE

## 2022-11-08 VITALS
SYSTOLIC BLOOD PRESSURE: 110 MMHG | BODY MASS INDEX: 29.42 KG/M2 | DIASTOLIC BLOOD PRESSURE: 68 MMHG | HEIGHT: 71 IN | WEIGHT: 210.13 LBS | HEART RATE: 64 BPM | OXYGEN SATURATION: 93 %

## 2022-11-08 VITALS
HEART RATE: 58 BPM | WEIGHT: 203.94 LBS | DIASTOLIC BLOOD PRESSURE: 78 MMHG | HEIGHT: 71 IN | SYSTOLIC BLOOD PRESSURE: 126 MMHG | BODY MASS INDEX: 28.55 KG/M2

## 2022-11-08 DIAGNOSIS — E78.2 MIXED HYPERLIPIDEMIA: ICD-10-CM

## 2022-11-08 DIAGNOSIS — G47.33 OSA (OBSTRUCTIVE SLEEP APNEA): ICD-10-CM

## 2022-11-08 DIAGNOSIS — G45.4 TRANSIENT GLOBAL AMNESIA: ICD-10-CM

## 2022-11-08 DIAGNOSIS — G47.00 INSOMNIA, UNSPECIFIED TYPE: ICD-10-CM

## 2022-11-08 DIAGNOSIS — N20.0 RECURRENT NEPHROLITHIASIS: Chronic | ICD-10-CM

## 2022-11-08 DIAGNOSIS — I25.10 CORONARY ARTERY DISEASE INVOLVING NATIVE CORONARY ARTERY OF NATIVE HEART WITHOUT ANGINA PECTORIS: Primary | ICD-10-CM

## 2022-11-08 DIAGNOSIS — C67.9 MALIGNANT NEOPLASM OF URINARY BLADDER, UNSPECIFIED SITE: ICD-10-CM

## 2022-11-08 DIAGNOSIS — E66.01 CLASS 2 SEVERE OBESITY DUE TO EXCESS CALORIES WITH SERIOUS COMORBIDITY IN ADULT, UNSPECIFIED BMI: ICD-10-CM

## 2022-11-08 DIAGNOSIS — I10 PRIMARY HYPERTENSION: ICD-10-CM

## 2022-11-08 DIAGNOSIS — Z12.5 PROSTATE CANCER SCREENING: ICD-10-CM

## 2022-11-08 DIAGNOSIS — I25.10 CORONARY ARTERY DISEASE INVOLVING NATIVE CORONARY ARTERY OF NATIVE HEART WITHOUT ANGINA PECTORIS: ICD-10-CM

## 2022-11-08 PROCEDURE — 99999 PR PBB SHADOW E&M-EST. PATIENT-LVL III: ICD-10-PCS | Mod: PBBFAC,,, | Performed by: INTERNAL MEDICINE

## 2022-11-08 PROCEDURE — 99213 OFFICE O/P EST LOW 20 MIN: CPT | Mod: PBBFAC,25 | Performed by: INTERNAL MEDICINE

## 2022-11-08 PROCEDURE — 99999 PR PBB SHADOW E&M-EST. PATIENT-LVL III: CPT | Mod: PBBFAC,,, | Performed by: INTERNAL MEDICINE

## 2022-11-08 PROCEDURE — 93010 ELECTROCARDIOGRAM REPORT: CPT | Mod: S$PBB,,, | Performed by: INTERNAL MEDICINE

## 2022-11-08 PROCEDURE — 93005 ELECTROCARDIOGRAM TRACING: CPT | Mod: PBBFAC | Performed by: INTERNAL MEDICINE

## 2022-11-08 PROCEDURE — 99213 OFFICE O/P EST LOW 20 MIN: CPT | Mod: S$PBB,,, | Performed by: INTERNAL MEDICINE

## 2022-11-08 PROCEDURE — 93010 EKG 12-LEAD: ICD-10-PCS | Mod: S$PBB,,, | Performed by: INTERNAL MEDICINE

## 2022-11-08 PROCEDURE — 99215 OFFICE O/P EST HI 40 MIN: CPT | Mod: S$PBB,,, | Performed by: INTERNAL MEDICINE

## 2022-11-08 PROCEDURE — 99215 PR OFFICE/OUTPT VISIT, EST, LEVL V, 40-54 MIN: ICD-10-PCS | Mod: S$PBB,,, | Performed by: INTERNAL MEDICINE

## 2022-11-08 PROCEDURE — 99213 OFFICE O/P EST LOW 20 MIN: CPT | Mod: PBBFAC,27,25 | Performed by: INTERNAL MEDICINE

## 2022-11-08 PROCEDURE — 99213 PR OFFICE/OUTPT VISIT, EST, LEVL III, 20-29 MIN: ICD-10-PCS | Mod: S$PBB,,, | Performed by: INTERNAL MEDICINE

## 2022-11-08 NOTE — PROGRESS NOTES
Subjective:       Patient ID: Gurjit Valentin is a 82 y.o. male.    Chief Complaint: Annual Exam    HPI  Transient global amnesia  Pt had a spell of TGA while attending his brother who was dying. Had onset of amnesia and was briefly admitted to [facility] 4/24/17 and a full neurological workup was negative. He has a similar spell of TGA in 2004, also with a negative neuro w/u.   study results from [facility]  included:  CT head w/wo contrast  CTA head and neck  CT brain perfusion with contrast  He has had no further episodes of this since 2017.     Coronary artery disease: Known 50% LAD stenosis.  Pt saw Dr. Tucker today and he felt that all was doing well from CAD standpoint. See his note. Pt has slacked off on exercising on [employer]  25-30 min three times weekly, but is committed to restarting his regular exercise program.   Lipids look good:  Lab Results   Component Value Date    CHOL 146 11/04/2022    CHOL 146 11/04/2022    CHOL 151 10/29/2021    CHOL 151 10/29/2021     Lab Results   Component Value Date    HDL 66 11/04/2022    HDL 66 11/04/2022    HDL 80 (H) 10/29/2021    HDL 80 (H) 10/29/2021     Lab Results   Component Value Date    LDLCALC 66.4 11/04/2022    LDLCALC 66.4 11/04/2022    LDLCALC 57.8 (L) 10/29/2021    LDLCALC 57.8 (L) 10/29/2021     Lab Results   Component Value Date    TRIG 68 11/04/2022    TRIG 68 11/04/2022    TRIG 66 10/29/2021    TRIG 66 10/29/2021     Lab Results   Component Value Date    CHOLHDL 45.2 11/04/2022    CHOLHDL 45.2 11/04/2022    CHOLHDL 53.0 (H) 10/29/2021    CHOLHDL 53.0 (H) 10/29/2021          Bladder cancer  Last visit with Dr. Kyle was 11/5/19 and cysto was negative for any sign of tumor recurrence.  No further rescreening cystoscopies were recommended and he has not seen urology since then. His UA today is completely normal.        Dermatitis  Has scaly rash on left posterior thigh and a few smaller patches on lateral leg. Suspicious for eczema. He has seen  Dr. Collins, his dermatologist, but topicals he is using have not solved the problem. I urged him to use his steroid cream regularly.     HTN  His BP appears well controlled on the ramipril 5 mg. (126/78 today). He has lost 6 lb since last physical exam in late 2021. He looks and feels good.     He has received 4 doses of Pfizer covid vaccine including the new bivalent booster on 10/6/22.     CBC, metabolic profile, sed rate, EKG  are all normal.   He will draw PSA on 11/9/22.    Last colonoscopy was 2017 and no repeat screening exam was recommended due to his age. We discussed that he may want to reconsider this recommendation when he is 7 years out from last scope.    Review of Systems   Constitutional: Negative for chills, decreased appetite, fever, malaise/fatigue, night sweats, weight gain and weight loss.   HENT:  Positive for congestion. Negative for ear pain, hearing loss, hoarse voice, sore throat and tinnitus.    Eyes:  Positive for visual disturbance. Negative for blurred vision and redness.   Cardiovascular:  Negative for chest pain, leg swelling and palpitations.   Respiratory:  Negative for cough, hemoptysis, shortness of breath and sputum production.    Hematologic/Lymphatic: Negative for adenopathy. Does not bruise/bleed easily.   Skin:  Negative for dry skin, itching, rash and suspicious lesions.   Musculoskeletal:  Negative for back pain, joint pain, myalgias and neck pain.   Gastrointestinal:  Negative for abdominal pain, constipation, diarrhea, heartburn, nausea and vomiting.   Genitourinary:  Negative for dysuria, flank pain, frequency, hematuria, hesitancy and urgency.   Neurological:  Negative for dizziness, headaches, numbness, paresthesias and weakness.   Psychiatric/Behavioral:  Negative for depression and memory loss. The patient has insomnia. The patient is not nervous/anxious.      Objective:      Physical Exam  Vitals reviewed.   Constitutional:       Appearance: He is well-developed.    HENT:      Head: Normocephalic and atraumatic.      Right Ear: External ear normal.      Left Ear: External ear normal.   Eyes:      Conjunctiva/sclera: Conjunctivae normal.      Pupils: Pupils are equal, round, and reactive to light.   Neck:      Thyroid: No thyromegaly.   Cardiovascular:      Rate and Rhythm: Normal rate and regular rhythm.      Heart sounds: Normal heart sounds. No murmur heard.  Pulmonary:      Effort: Pulmonary effort is normal.      Breath sounds: Normal breath sounds. No wheezing or rales.   Abdominal:      General: Bowel sounds are normal.      Palpations: Abdomen is soft. There is no mass.      Tenderness: There is no abdominal tenderness. There is no rebound.   Musculoskeletal:         General: Normal range of motion.      Cervical back: Normal range of motion and neck supple.   Lymphadenopathy:      Cervical: No cervical adenopathy.   Skin:     General: Skin is warm and dry.   Neurological:      Mental Status: He is alert and oriented to person, place, and time.   Psychiatric:         Behavior: Behavior normal.       Assessment:       1. Coronary artery disease involving native coronary artery of native heart without angina pectoris    2. Prostate cancer screening    3. Transient global amnesia    4. Mixed hyperlipidemia , well controlled   5. Recurrent nephrolithiasis    6. Malignant neoplasm of urinary bladder without evidence of recurrence   7. Insomnia   8. DELVIN (obstructive sleep apnea) on CPAP   9. Primary hypertension , well controlled       Plan:        1. Obtain Shingrix #2 at pharmacy   2. Continue present meds   3. Resume regular aerobic exercise program   4. PSA to be drawn tomorrow am     5. Needs to decide on new PCP with my impending USP

## 2022-11-09 ENCOUNTER — LAB VISIT (OUTPATIENT)
Dept: LAB | Facility: HOSPITAL | Age: 82
End: 2022-11-09
Attending: INTERNAL MEDICINE
Payer: MEDICARE

## 2022-11-09 DIAGNOSIS — Z12.5 PROSTATE CANCER SCREENING: ICD-10-CM

## 2022-11-09 LAB — COMPLEXED PSA SERPL-MCNC: 1.8 NG/ML (ref 0–4)

## 2022-11-09 PROCEDURE — 36415 COLL VENOUS BLD VENIPUNCTURE: CPT | Performed by: INTERNAL MEDICINE

## 2022-11-09 PROCEDURE — 84153 ASSAY OF PSA TOTAL: CPT | Performed by: INTERNAL MEDICINE

## 2022-12-12 ENCOUNTER — TELEPHONE (OUTPATIENT)
Dept: INTERNAL MEDICINE | Facility: CLINIC | Age: 82
End: 2022-12-12
Payer: MEDICARE

## 2022-12-13 ENCOUNTER — TELEPHONE (OUTPATIENT)
Dept: INTERNAL MEDICINE | Facility: CLINIC | Age: 82
End: 2022-12-13
Payer: MEDICARE

## 2022-12-13 DIAGNOSIS — G47.00 INSOMNIA, UNSPECIFIED TYPE: ICD-10-CM

## 2022-12-13 RX ORDER — ESZOPICLONE 3 MG/1
3 TABLET, FILM COATED ORAL NIGHTLY
Qty: 60 TABLET | Refills: 3 | Status: SHIPPED | OUTPATIENT
Start: 2022-12-13 | End: 2023-08-11 | Stop reason: SDUPTHER

## 2022-12-19 ENCOUNTER — TELEPHONE (OUTPATIENT)
Dept: INTERNAL MEDICINE | Facility: CLINIC | Age: 82
End: 2022-12-19
Payer: MEDICARE

## 2022-12-19 DIAGNOSIS — E78.5 HYPERLIPIDEMIA, UNSPECIFIED HYPERLIPIDEMIA TYPE: ICD-10-CM

## 2022-12-19 RX ORDER — ROSUVASTATIN CALCIUM 20 MG/1
20 TABLET, COATED ORAL DAILY
Qty: 90 TABLET | Refills: 3 | Status: SHIPPED | OUTPATIENT
Start: 2022-12-19 | End: 2024-01-13

## 2022-12-19 RX ORDER — BENZONATATE 100 MG/1
100 CAPSULE ORAL 3 TIMES DAILY PRN
Qty: 30 CAPSULE | Refills: 3 | Status: SHIPPED | OUTPATIENT
Start: 2022-12-19 | End: 2022-12-29

## 2023-01-05 ENCOUNTER — OFFICE VISIT (OUTPATIENT)
Dept: INTERNAL MEDICINE | Facility: CLINIC | Age: 83
End: 2023-01-05
Payer: MEDICARE

## 2023-01-05 VITALS
SYSTOLIC BLOOD PRESSURE: 115 MMHG | WEIGHT: 213.88 LBS | HEART RATE: 60 BPM | HEIGHT: 71 IN | DIASTOLIC BLOOD PRESSURE: 73 MMHG | BODY MASS INDEX: 29.94 KG/M2 | TEMPERATURE: 98 F

## 2023-01-05 DIAGNOSIS — Z76.89 ENCOUNTER TO ESTABLISH CARE: Primary | ICD-10-CM

## 2023-01-05 DIAGNOSIS — J31.0 RHINITIS, UNSPECIFIED TYPE: ICD-10-CM

## 2023-01-05 DIAGNOSIS — R39.9 LOWER URINARY TRACT SYMPTOMS (LUTS): ICD-10-CM

## 2023-01-05 PROCEDURE — 99999 PR PBB SHADOW E&M-EST. PATIENT-LVL III: CPT | Mod: PBBFAC,,, | Performed by: INTERNAL MEDICINE

## 2023-01-05 PROCEDURE — 99215 PR OFFICE/OUTPT VISIT, EST, LEVL V, 40-54 MIN: ICD-10-PCS | Mod: S$PBB,,, | Performed by: INTERNAL MEDICINE

## 2023-01-05 PROCEDURE — 99999 PR PBB SHADOW E&M-EST. PATIENT-LVL III: ICD-10-PCS | Mod: PBBFAC,,, | Performed by: INTERNAL MEDICINE

## 2023-01-05 PROCEDURE — 99215 OFFICE O/P EST HI 40 MIN: CPT | Mod: S$PBB,,, | Performed by: INTERNAL MEDICINE

## 2023-01-05 PROCEDURE — 99213 OFFICE O/P EST LOW 20 MIN: CPT | Mod: PBBFAC | Performed by: INTERNAL MEDICINE

## 2023-01-05 RX ORDER — FLUTICASONE PROPIONATE 50 MCG
2 SPRAY, SUSPENSION (ML) NASAL DAILY
Qty: 16 G | Refills: 5 | Status: SHIPPED | OUTPATIENT
Start: 2023-01-05 | End: 2023-08-07

## 2023-01-06 NOTE — PROGRESS NOTES
Subjective:       Patient ID: Gurjit Valentin is a 82 y.o. male.    Chief Complaint: Establish Care      HPI  Establishing care,  clinic. Reviewed medical, surgical, social and family history, medications, appropriate preventive health screenings, as well as vaccination history. Updates as noted below or in assessment and plan.    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 Generally active and well, though does note less exercise this past yr.  Hx of CAD. On Crestor 20 with lipids controlled. Ramipril for years now for CV protection. Follows with Cards.  Hx of BPH with LUTS. Had a cold before christmas. Notes some acute on chronic dry cough and throat clearing since then. Also notes increased nocturia and weakness of stream. Has been using antihistamine and decongestant. Throat clearing issue is chronic. Notes gerd was considered but had normal EGD and trial PPI no effect. Normal CXR 2012. Essentially social smoking hx many years ago.    Review of Systems   All other systems reviewed and are negative.    Past Medical History:   Diagnosis Date    Allergy     Basal cell carcinoma     Bladder cancer     BPH with obstruction/lower urinary tract symptoms     Cataract     Chronic cough     Coronary artery disease     Hyperlipidemia     Increased prostate specific antigen (PSA) velocity 10/15/2015    Insomnia     Kidney stone     Personal history of colonic polyps     Skin cancer     Sleep apnea     Transient global amnesia     x2         Current Outpatient Medications:     desonide (DESOWEN) 0.05 % cream, Apply 1 application topically Daily., Disp: , Rfl:     eszopiclone (LUNESTA) 3 mg Tab, Take 1 tablet (3 mg total) by mouth every evening., Disp: 60 tablet, Rfl: 3    omega-3 fatty acids 300 mg Cap, Take 300 mg by mouth., Disp: , Rfl:     pseudoephedrine (SUDAFED) 120 mg 12 hr tablet, Take 120 mg by mouth every 12 (twelve) hours.,  Disp: , Rfl:     ramipriL (ALTACE) 5 MG capsule, Take 1 capsule (5 mg total) by mouth once daily., Disp: 90 capsule, Rfl: 3    rosuvastatin (CRESTOR) 20 MG tablet, Take 1 tablet (20 mg total) by mouth once daily., Disp: 90 tablet, Rfl: 3    fluticasone propionate (FLONASE) 50 mcg/actuation nasal spray, 2 sprays (100 mcg total) by Each Nostril route once daily., Disp: 16 g, Rfl: 5    Past Surgical History:   Procedure Laterality Date    BLADDER SURGERY      Ca excision    CARDIAC CATHETERIZATION      COLONOSCOPY N/A 2017    No further screening planned.    CYSTOSCOPY      EYE SURGERY Bilateral     Cataract    KIDNEY STONE SURGERY      LITHOTRIPSY      TONSILLECTOMY      at age 4       Family History   Problem Relation Age of Onset    Hypertension Mother     Hypertension Brother     Heart attack Brother     No Known Problems Father     No Known Problems Maternal Grandmother     No Known Problems Maternal Grandfather     No Known Problems Paternal Grandmother     No Known Problems Paternal Grandfather     No Known Problems Sister     No Known Problems Maternal Aunt     No Known Problems Maternal Uncle     No Known Problems Paternal Aunt     No Known Problems Paternal Uncle     Colon polyps Neg Hx     Cancer Neg Hx     Heart disease Neg Hx     Anemia Neg Hx     Arrhythmia Neg Hx     Asthma Neg Hx     Clotting disorder Neg Hx     Fainting Neg Hx     Heart failure Neg Hx     Hyperlipidemia Neg Hx        Social History     Tobacco Use    Smoking status: Former     Packs/day: 0.25     Years: 20.00     Pack years: 5.00     Types: Cigarettes     Quit date: 1988     Years since quittin.0    Smokeless tobacco: Never    Tobacco comments:     STOPPED 30 YRS AGO.   Substance Use Topics    Alcohol use: Yes     Comment: 1 to 2 drinks most days    Drug use: No       Immunization History   Administered Date(s) Administered    COVID-19, MRNA, LN-S, PF (Pfizer) (Purple Cap) 2021, 2021, 2021    Influenza  (FLUAD) - Trivalent - Adjuvanted - PF (65+) 09/15/2017, 10/06/2018    Influenza - High Dose - PF (65 years and older) 10/06/2014, 09/19/2015, 09/17/2016, 09/13/2017, 09/10/2019, 10/21/2021, 10/06/2022    Influenza - Trivalent (ADULT) 10/10/2007, 09/22/2009, 09/08/2010, 10/31/2011    Influenza - Trivalent - PF (ADULT) 09/12/2020    Influenza Split 10/01/2015    Pneumococcal Conjugate - 13 Valent 10/06/2014, 10/15/2015    Pneumococcal Polysaccharide - 23 Valent 11/07/2019    Tdap 10/06/2014    Zoster 10/06/2014    Zoster Recombinant 11/04/2020         Objective:      Vitals:    01/05/23 1401   BP: 115/73   Pulse: 60   Temp: 98.1 °F (36.7 °C)       Physical Exam  Constitutional:       General: He is not in acute distress.     Appearance: Normal appearance. He is well-developed. He is not ill-appearing.   HENT:      Head: Normocephalic and atraumatic.      Right Ear: Hearing and tympanic membrane normal. There is no impacted cerumen.      Left Ear: Hearing and tympanic membrane normal. There is no impacted cerumen.      Nose: Nose normal.      Mouth/Throat:      Mouth: Mucous membranes are moist.      Pharynx: Oropharynx is clear.   Eyes:      Extraocular Movements: Extraocular movements intact.      Conjunctiva/sclera: Conjunctivae normal.   Neck:      Vascular: No carotid bruit.   Cardiovascular:      Rate and Rhythm: Normal rate and regular rhythm.      Heart sounds: Normal heart sounds. No murmur heard.  Pulmonary:      Effort: Pulmonary effort is normal. No respiratory distress.      Breath sounds: Normal breath sounds. No wheezing, rhonchi or rales.   Abdominal:      General: Abdomen is flat. There is no distension.      Palpations: Abdomen is soft. There is no mass.      Tenderness: There is no abdominal tenderness.   Musculoskeletal:         General: No swelling or deformity.      Cervical back: No tenderness.   Lymphadenopathy:      Cervical: No cervical adenopathy.   Skin:     General: Skin is warm and dry.       Findings: No lesion or rash.   Neurological:      General: No focal deficit present.      Mental Status: He is alert and oriented to person, place, and time.      Cranial Nerves: No cranial nerve deficit.      Coordination: Coordination normal.      Gait: Gait normal.   Psychiatric:         Mood and Affect: Mood normal.         Behavior: Behavior normal.         Thought Content: Thought content normal.         Judgment: Judgment normal.       Recent Results (from the past 2016 hour(s))   Urinalysis    Collection Time: 11/04/22  7:55 AM   Result Value Ref Range    Specimen UA Urine, Clean CatchUrine, Clean CatchUrin     Color, UA Yellow Yellow, Straw, [place]    Appearance, UA Clear Clear    pH, UA 6.0 5.0 - 8.0    Specific Gravity, UA 1.015 1.005 - 1.030    Protein, UA Negative Negative    Glucose, UA Negative Negative    Ketones, UA Negative Negative    Bilirubin (UA) Negative Negative    Occult Blood UA Negative Negative    Nitrite, UA Negative Negative    Leukocytes, UA Negative Negative   Lipid Panel    Collection Time: 11/04/22  8:10 AM   Result Value Ref Range    Cholesterol 146 120 - 199 mg/dL    Triglycerides 68 30 - 150 mg/dL    HDL 66 40 - 75 mg/dL    LDL Cholesterol 66.4 63.0 - 159.0 mg/dL    HDL/Cholesterol Ratio 45.2 20.0 - 50.0 %    Total Cholesterol/HDL Ratio 2.2 2.0 - 5.0    Non-HDL Cholesterol 80 mg/dL   CBC Auto Differential    Collection Time: 11/04/22  8:10 AM   Result Value Ref Range    WBC 6.15 3.90 - 12.70 K/uL    RBC 4.57 (L) 4.60 - 6.20 M/uL    Hemoglobin 14.7 14.0 - 18.0 g/dL    Hematocrit 43.3 40.0 - 54.0 %    MCV 95 82 - 98 fL    MCH 32.2 (H) 27.0 - 31.0 pg    MCHC 33.9 32.0 - 36.0 g/dL    RDW 13.4 11.5 - 14.5 %    Platelets 216 150 - 450 K/uL    MPV 10.9 9.2 - 12.9 fL    Immature Granulocytes 0.2 0.0 - 0.5 %    Gran # (ANC) 3.8 1.8 - 7.7 K/uL    Immature Grans (Abs) 0.01 0.00 - 0.04 K/uL    Lymph # 1.2 1.0 - 4.8 K/uL    Mono # 0.7 0.3 - 1.0 K/uL    Eos # 0.3 0.0 - 0.5 K/uL    Baso # 0.07  "0.00 - 0.20 K/uL    nRBC 0 0 /100 WBC    Gran % 62.3 38.0 - 73.0 %    Lymph % 19.8 18.0 - 48.0 %    Mono % 11.1 4.0 - 15.0 %    Eosinophil % 5.5 0.0 - 8.0 %    Basophil % 1.1 0.0 - 1.9 %    Differential Method Automated    Comprehensive Metabolic Panel    Collection Time: 11/04/22  8:10 AM   Result Value Ref Range    Sodium 138 136 - 145 mmol/L    Potassium 4.6 3.5 - 5.1 mmol/L    Chloride 107 95 - 110 mmol/L    CO2 24 23 - 29 mmol/L    Glucose 112 (H) 70 - 110 mg/dL    BUN 16 8 - 23 mg/dL    Creatinine 1.0 0.5 - 1.4 mg/dL    Calcium 9.1 8.7 - 10.5 mg/dL    Total Protein 6.8 6.0 - 8.4 g/dL    Albumin 3.8 3.5 - 5.2 g/dL    Total Bilirubin 0.6 0.1 - 1.0 mg/dL    Alkaline Phosphatase 34 (L) 55 - 135 U/L    AST 17 10 - 40 U/L    ALT 13 10 - 44 U/L    Anion Gap 7 (L) 8 - 16 mmol/L    eGFR >60.0 >60 mL/min/1.73 m^2   Lipid Panel    Collection Time: 11/04/22  8:10 AM   Result Value Ref Range    Cholesterol 146 120 - 199 mg/dL    Triglycerides 68 30 - 150 mg/dL    HDL 66 40 - 75 mg/dL    LDL Cholesterol 66.4 63.0 - 159.0 mg/dL    HDL/Cholesterol Ratio 45.2 20.0 - 50.0 %    Total Cholesterol/HDL Ratio 2.2 2.0 - 5.0    Non-HDL Cholesterol 80 mg/dL   Sedimentation rate    Collection Time: 11/04/22  8:10 AM   Result Value Ref Range    Sed Rate 9 0 - 23 mm/Hr   PSA, Screening    Collection Time: 11/09/22  8:22 AM   Result Value Ref Range    PSA, Screen 1.8 0.00 - 4.00 ng/mL          Assessment/Plan:     1) Establishing primary care  2) Cough, "Throat clearing" - Chronic issue worsened post recent probable viral respiratory infection. Start Flonase daily. Notes no effect with trial PPI in past. May consider updating CXR and trial off Ramipril if persistent issue over coming weeks.  3) BPH with LUTS - Nocturia and weakening of stream worse in past few weeks which I suspect is at least partly antihistamine and decongestant related. Switching to Flonase. Depending on progress, agreed on considering trial of Flomax. PSA recently " normal as well as UA.  4) Bladder ca hx - S/P excision and notes Urology felt he had completed adequate surveillance after 7 yrs.  5) Skin ca hx - Follows with Derm at least yearly.  6) CAD - Stable clinically with controlled lipids on Crestor 20. On Ramipril for CV protection. Discussed aspirin daily still appropriate in light of known CAD hx as well as transient global amnesia hx.   7) Kidney stone hx - No recurrence in years. Continue hydration focus.   8) Insomnia - Controlled on Lunesta nightly.    - Notes had 2nd Shingrix. Vaccines up to date.  - Colonoscopy with polyp 2017. No further f/u recommended.    Due for repeat annual wellness exam and labs 11/23.

## 2023-02-06 ENCOUNTER — TELEPHONE (OUTPATIENT)
Dept: DERMATOLOGY | Facility: CLINIC | Age: 83
End: 2023-02-06
Payer: MEDICARE

## 2023-02-06 NOTE — TELEPHONE ENCOUNTER
EP scheduled for same-day Mohs BCC R mid cheek on 3/29 at 12:30pm, referral from Dr. Collins. Pt confirmed date, time, and location. Appt information sent in the mail.

## 2023-03-29 ENCOUNTER — PROCEDURE VISIT (OUTPATIENT)
Dept: DERMATOLOGY | Facility: CLINIC | Age: 83
End: 2023-03-29
Payer: MEDICARE

## 2023-03-29 ENCOUNTER — OFFICE VISIT (OUTPATIENT)
Dept: INTERNAL MEDICINE | Facility: CLINIC | Age: 83
End: 2023-03-29
Payer: MEDICARE

## 2023-03-29 VITALS
HEART RATE: 51 BPM | DIASTOLIC BLOOD PRESSURE: 84 MMHG | WEIGHT: 213 LBS | SYSTOLIC BLOOD PRESSURE: 143 MMHG | BODY MASS INDEX: 29.82 KG/M2 | HEIGHT: 71 IN

## 2023-03-29 DIAGNOSIS — M25.562 ACUTE PAIN OF LEFT KNEE: Primary | ICD-10-CM

## 2023-03-29 DIAGNOSIS — C44.319 BASAL CELL CARCINOMA OF RIGHT CHEEK: Primary | ICD-10-CM

## 2023-03-29 PROCEDURE — 17312: ICD-10-PCS | Mod: S$PBB,,, | Performed by: DERMATOLOGY

## 2023-03-29 PROCEDURE — 17311 MOHS 1 STAGE H/N/HF/G: CPT | Mod: S$PBB,,, | Performed by: DERMATOLOGY

## 2023-03-29 PROCEDURE — 17311 MOHS 1 STAGE H/N/HF/G: CPT | Mod: PBBFAC | Performed by: DERMATOLOGY

## 2023-03-29 PROCEDURE — 99212 PR OFFICE/OUTPT VISIT, EST, LEVL II, 10-19 MIN: ICD-10-PCS | Mod: S$PBB,,, | Performed by: INTERNAL MEDICINE

## 2023-03-29 PROCEDURE — 17311: ICD-10-PCS | Mod: S$PBB,,, | Performed by: DERMATOLOGY

## 2023-03-29 PROCEDURE — 13132 PR RECMPL WND HEAD,FAC,HAND 2.6-7.5 CM: ICD-10-PCS | Mod: S$PBB,51,, | Performed by: DERMATOLOGY

## 2023-03-29 PROCEDURE — 13132 CMPLX RPR F/C/C/M/N/AX/G/H/F: CPT | Mod: PBBFAC | Performed by: DERMATOLOGY

## 2023-03-29 PROCEDURE — 17312 MOHS ADDL STAGE: CPT | Mod: PBBFAC | Performed by: DERMATOLOGY

## 2023-03-29 PROCEDURE — 99212 OFFICE O/P EST SF 10 MIN: CPT | Mod: S$PBB,,, | Performed by: INTERNAL MEDICINE

## 2023-03-29 PROCEDURE — 17312 MOHS ADDL STAGE: CPT | Mod: S$PBB,,, | Performed by: DERMATOLOGY

## 2023-03-29 PROCEDURE — 99499 UNLISTED E&M SERVICE: CPT | Mod: S$PBB,,, | Performed by: DERMATOLOGY

## 2023-03-29 PROCEDURE — 99499 NO LOS: ICD-10-PCS | Mod: S$PBB,,, | Performed by: DERMATOLOGY

## 2023-03-29 PROCEDURE — 13132 CMPLX RPR F/C/C/M/N/AX/G/H/F: CPT | Mod: S$PBB,51,, | Performed by: DERMATOLOGY

## 2023-03-29 NOTE — PROGRESS NOTES
PROCEDURE: Mohs' Micrographic Surgery    INDICATION: Location in non-mask areas of face where maximum conservation of tumor-free tissue is needed. Biopsy-proven skin cancer of cosmetically and functionally important areas, including head, neck, genital, hand, foot, or areas known for having difficulty in healing, such as the lower anterior legs. Tumor with ill-defined borders.    REFERRING PROVIDER: Sandro Collins M.D.    CASE NUMBER:     ANESTHETIC: 3.5 cc 0.5% Bupivacaine with Epi 1:200,000 mixed 1:1 with plain 1% Lidocaine    SURGICAL PREP: Hibiclens    SURGEON: Juan Plasencia MD    ASSISTANTS: Lidia Mtz PA-C and Kendal Balbuena MA    PREOPERATIVE DIAGNOSIS: basal cell carcinoma- nodular    POSTOPERATIVE DIAGNOSIS: basal cell carcinoma- superficial, nodular, infiltrating    PATHOLOGIC DIAGNOSIS: basal cell carcinoma- superficial, nodular, infiltrating    HISTOLOGY OF SPECIMENS IN FIRST STAGE:   Debulking tumor confirms superficial, nodular, and infiltrating basal cell carcinoma.  Tumor Type: Tumor seen. Superficial basal cell carcinoma: Foci of basaloid cells with peripheral palisading and focal retraction artifact arising along the dermoepidermal junction and extending into the papillary dermis.  Nodular basal cell carcinoma: Nodular tumor in dermis composed of basaloid cells exhibiting peripheral palisading and retraction artifact.  Infiltrative basal cell carcinoma: Basaloid tumor in dermis characterized by thin elongated strands of basaloid cells infiltrating between dermal collagen bundles.   Depth of Invasion: epidermis and dermis  Perineural Invasion: No    HISTOLOGY OF SPECIMENS IN SUBSEQUENT STAGES:  Tumor Type:  No tumor seen.    STAGES OF MOHS' SURGERY PERFORMED: 2    TUMOR-FREE PLANE ACHIEVED: Yes    HEMOSTASIS: electrocoagulation     SPECIMENS: 3 (2 in stage A and 1 in stage B)    LOCATION: right mid cheek. Location verified with Dr. Collins's clinical photograph. Patient also verified  location with hand held mirror.    INITIAL LESION SIZE: 0.5 x 0.7 cm    FINAL DEFECT SIZE: 1.0 x 1.2 cm    WOUND REPAIR/DISPOSITION: The patient tolerated Mohs' Micrographic Surgery for a basal cell carcinoma very well. When the tumor was completely removed, a repair of the surgical defect was undertaken.    PROCEDURE: Complex Linear Repair    INDICATION: Status post Mohs' Micrographic Surgery for basal cell carcinoma.    CASE NUMBER:     SURGEON: Juan Plasencia MD    ASSISTANTS: Lidia Mtz PA-C and Kendal Balbuena MA    ANESTHETIC: 2.5 cc 1% Lidocaine with Epinephrine 1:100,000    SURGICAL PREP: Hibiclens, prepped by Kendal Balbuena MA    LOCATION: right mid cheek    DEFECT SIZE: 1.0 x 1.2 cm    WOUND REPAIR/DISPOSITION:  After the patient's carcinoma had been completely removed with Mohs' Micrographic Surgery, a repair of the surgical defect was undertaken. The patient was returned to the operating suite where the area of right mid cheek was prepped, draped, and anesthetized in the usual sterile fashion. The wound was widely undermined in all directions. The wound was undermined to a distance at least the maximum width of the defect as measured perpendicular to the closure line along at least one entire edge of the defect, in this case 2 cm. Then, electrocoagulation was used to obtain meticulous hemostasis. 5-0 Vicryl buried vertical mattress sutures were placed into the subcutaneous and dermal plane to close the wound and kaela the cutaneous wound edge. Bilateral dog ears were identified and were removed by a standard Burow's triangle technique. The cutaneous wound edges were closed using interrupted 5-0 Prolene suture.    The patient tolerated the procedure well.    The area was cleaned and dressed appropriately and the patient was given wound care instructions, as well as appointment for follow-up evaluation and suture removal in 7 days.    LENGTH OF REPAIR: 3.4 cm    Vitals:    03/29/23 1228 03/29/23  "1450   BP: 123/73 (!) 143/84   BP Location: Left arm    Patient Position: Sitting    BP Method: Medium (Automatic)    Pulse: 62 (!) 51   Weight: 96.6 kg (213 lb)    Height: 5' 11" (1.803 m)          "

## 2023-03-29 NOTE — PROGRESS NOTES
Subjective:       Patient ID: Gurjit Valentin is a 82 y.o. male.    Chief Complaint: Left knee pain    HPI:  Was at [facility] facility today for Moh's procedure on face. Has been having some left knee pain past couple of days, so stopped by the office. Notes pain while walking stairs 2 days ago. Has improved. Pain focal at proximal lateral knee. No swelling. Stable ambulation.    Past Medical History:   Diagnosis Date    Allergy     Basal cell carcinoma     Basal cell carcinoma of right cheek 03/29/2023    R mid cheek    Bladder cancer     BPH with obstruction/lower urinary tract symptoms     Cataract     Chronic cough     Coronary artery disease     Hyperlipidemia     Increased prostate specific antigen (PSA) velocity 10/15/2015    Insomnia     Kidney stone     Personal history of colonic polyps     Skin cancer     Sleep apnea     Transient global amnesia     x2         Current Outpatient Medications:     desonide (DESOWEN) 0.05 % cream, Apply 1 application topically Daily., Disp: , Rfl:     eszopiclone (LUNESTA) 3 mg Tab, Take 1 tablet (3 mg total) by mouth every evening., Disp: 60 tablet, Rfl: 3    fluticasone propionate (FLONASE) 50 mcg/actuation nasal spray, 2 sprays (100 mcg total) by Each Nostril route once daily., Disp: 16 g, Rfl: 5    omega-3 fatty acids 300 mg Cap, Take 300 mg by mouth., Disp: , Rfl:     pseudoephedrine (SUDAFED) 120 mg 12 hr tablet, Take 120 mg by mouth every 12 (twelve) hours., Disp: , Rfl:     ramipriL (ALTACE) 5 MG capsule, Take 1 capsule (5 mg total) by mouth once daily., Disp: 90 capsule, Rfl: 3    rosuvastatin (CRESTOR) 20 MG tablet, Take 1 tablet (20 mg total) by mouth once daily., Disp: 90 tablet, Rfl: 3    Past Surgical History:   Procedure Laterality Date    BLADDER SURGERY      Ca excision    CARDIAC CATHETERIZATION      COLONOSCOPY N/A 12/04/2017    No further screening planned.    CYSTOSCOPY      EYE SURGERY Bilateral     Cataract    KIDNEY STONE SURGERY      LITHOTRIPSY       TONSILLECTOMY      at age 4       Social History     Tobacco Use    Smoking status: Former     Packs/day: 0.25     Years: 20.00     Pack years: 5.00     Types: Cigarettes     Quit date: 1988     Years since quittin.2    Smokeless tobacco: Never    Tobacco comments:     STOPPED 30 YRS AGO.   Substance Use Topics    Alcohol use: Yes     Comment: 1 to 2 drinks most days    Drug use: No         Objective:      There were no vitals filed for this visit.    Physical Exam  Constitutional:       Appearance: Normal appearance.   Musculoskeletal:         General: Tenderness (Proximal lateral knee just above joint.) present. No swelling, deformity or signs of injury.      Right lower leg: No edema.      Left lower leg: No edema.   Skin:     Findings: No bruising, lesion or rash.   Neurological:      General: No focal deficit present.      Mental Status: He is alert and oriented to person, place, and time.   Psychiatric:         Mood and Affect: Mood normal.         Behavior: Behavior normal.         Assessment/Plan:     1) Left knee pain - Mild/moderate after walking stairs 2 days ago. Focal TTP without swelling at proximal lateral joint. Suspect tendonitis and/or OA related. Agreed on exercise focus such as cycling for strengthening long-term. Start Tylenol 1000 mg twice daily prn, okay to use 3x daily short-term. If symptoms worsening or persistent, will consider PT, imaging and/or Sports Med referral.

## 2023-04-03 ENCOUNTER — TELEPHONE (OUTPATIENT)
Dept: INTERNAL MEDICINE | Facility: CLINIC | Age: 83
End: 2023-04-03
Payer: MEDICARE

## 2023-04-03 NOTE — TELEPHONE ENCOUNTER
----- Message from Alexandrea Guan MA sent at 4/3/2023  3:46 PM CDT -----  Please put a PT order for his knee to [facility]. Let me know when you do

## 2023-04-05 ENCOUNTER — OFFICE VISIT (OUTPATIENT)
Dept: DERMATOLOGY | Facility: CLINIC | Age: 83
End: 2023-04-05
Payer: MEDICARE

## 2023-04-05 DIAGNOSIS — Z09 POSTOP CHECK: Primary | ICD-10-CM

## 2023-04-05 PROCEDURE — 99024 POSTOP FOLLOW-UP VISIT: CPT | Mod: POP,,, | Performed by: DERMATOLOGY

## 2023-04-05 PROCEDURE — 99024 PR POST-OP FOLLOW-UP VISIT: ICD-10-PCS | Mod: POP,,, | Performed by: DERMATOLOGY

## 2023-04-05 PROCEDURE — 99212 OFFICE O/P EST SF 10 MIN: CPT | Mod: PBBFAC | Performed by: DERMATOLOGY

## 2023-04-05 PROCEDURE — 99999 PR PBB SHADOW E&M-EST. PATIENT-LVL II: ICD-10-PCS | Mod: PBBFAC,,, | Performed by: DERMATOLOGY

## 2023-04-05 PROCEDURE — 99999 PR PBB SHADOW E&M-EST. PATIENT-LVL II: CPT | Mod: PBBFAC,,, | Performed by: DERMATOLOGY

## 2023-04-05 NOTE — PROGRESS NOTES
82 y.o. male patient is here for suture removal following Mohs' surgery.    Patient reports no problems.    WOUND PE:  The R mid cheek sutures intact. Wound healing well. Good skin edges. No signs or symptoms of infection.    IMPRESSION:  Healing operative site from Mohs' surgery, BCC R mid cheek s/p Mohs with CLC, postop day #7.    PLAN:  Sutures removed today by  Aixa Cabrera MA .   Steri-strips applied.  Keep moist with Aquaphor.  Call if any issues arise    RTC:  In 3-6 months with Sandro Collins M.D. for skin check or sooner if new concern arises.

## 2023-04-11 ENCOUNTER — CLINICAL SUPPORT (OUTPATIENT)
Dept: REHABILITATION | Facility: HOSPITAL | Age: 83
End: 2023-04-11
Attending: INTERNAL MEDICINE
Payer: MEDICARE

## 2023-04-11 DIAGNOSIS — M22.42 CHONDROMALACIA, PATELLA, LEFT: ICD-10-CM

## 2023-04-11 DIAGNOSIS — G89.29 CHRONIC PAIN OF LEFT KNEE: ICD-10-CM

## 2023-04-11 DIAGNOSIS — M25.562 ACUTE PAIN OF LEFT KNEE: ICD-10-CM

## 2023-04-11 DIAGNOSIS — M25.562 CHRONIC PAIN OF LEFT KNEE: ICD-10-CM

## 2023-04-11 PROCEDURE — 97110 THERAPEUTIC EXERCISES: CPT | Mod: PO

## 2023-04-11 PROCEDURE — 97161 PT EVAL LOW COMPLEX 20 MIN: CPT | Mod: PO

## 2023-04-11 NOTE — PLAN OF CARE
[facility]   Physical Therapy Initial Evaluation       Name: Gurjit Valentin  Clinic Number: [id number]    Therapy Diagnosis:   Encounter Diagnoses   Name Primary?    Acute pain of left knee     Chronic pain of left knee     Chondromalacia, patella, left         Physician: Huseyin Rice MD    Physician Orders: PT Eval and Treat   Medical Diagnosis from Referral: Acute pain of left knee  Evaluation Date: 4/11/2023  Authorization Period Expiration: 4/5/24  Plan of Care Expiration: 5/23/23  Progress Note Due: 5/9/23  Visit # / Visits authorized: 1/ 1   FOTO: TBD    Precautions: Standard     Time In: 950 AM  Time Out: 1030 AM  Total Appointment Time (timed & untimed codes): 40 minutes      SUBJECTIVE     Date of onset: chronic     History of current condition - Francisco reports: he played college soccer and the knee cap dislocated twice. He didn't damage ACL or Meniscus either time. Just had fluid drained. He lead a very active life. Always had a little weakness in the left knee so he wore a sleeve with sports and activities. About a week to 10 days ago, he was at the [employer] and the knee hurt and almost gave way when walking up the stairs. Going down the stairs he endorses he will have a limp and will have pain. He went to Dr. Rice and he does not believe that there is an ACL or meniscus involvement. Dr. Rice thinks it could be more tendinitis.  For the last year he has not been dutifully going on his elliptical.  He reports that he went to an ortho doc at [facility] about 7-8 years ago and he reports that they took imaging and everything looked perfect.    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[employer])   Prior Level of Function: independent   Current Level of Function: independent but has pain  "with activities    Pain:  Current 0/10, worst 5/10, best 0/10   Location: left knee   Description: Sharp  Aggravating Factors: going down stairs, stepping, crossing the legs   Easing Factors: nothing    Patients goals: "get my knee back in shape" "play pickleball" "not have any difficulty going up or down stairs, and playing golf, and sailing"    Medical History:   Past Medical History:   Diagnosis Date    Allergy     Basal cell carcinoma     Basal cell carcinoma of right cheek 03/29/2023    R mid cheek    Bladder cancer     BPH with obstruction/lower urinary tract symptoms     Cataract     Chronic cough     Coronary artery disease     Hyperlipidemia     Increased prostate specific antigen (PSA) velocity 10/15/2015    Insomnia     Kidney stone     Personal history of colonic polyps     Skin cancer     Sleep apnea     Transient global amnesia     x2       Surgical History:   Gurjit Valentin  has a past surgical history that includes Eye surgery (Bilateral); Tonsillectomy; Lithotripsy; Kidney stone surgery; Bladder surgery; Cystoscopy; Cardiac catheterization; and Colonoscopy (N/A, 12/04/2017).    Medications:   Gurjit has a current medication list which includes the following prescription(s): desonide, eszopiclone, fluticasone propionate, omega-3 fatty acids, pseudoephedrine, ramipril, and rosuvastatin.    Allergies:   Review of patient's allergies indicates:  No Known Allergies       OBJECTIVE     Observation: enters clinic independently without assistive deice        Active ROM: (measured in degrees)   Knee    Right 0-130   Left 0-128   PROM: WNL     Sensation: Intact     Lower Extremity Strength (graded 0-5 out of 5)    Right LE Left LE   Hip flexion: 5/5 4+/5   Knee extension:  5/5 4+/5 *pain   Ankle dorsiflexion: 5/5 5/5   Posterior fibers of Gluteus Medius 4+/5 >/=4/5   Hip extension: 4/5 4/5   Knee flexion:  5/5 4+/5     Special Tests: (pos. or neg.)    (R) (L)   Valgus Stress Test - -   Varus Stress test - " "-   Lachman's test - -   Halina's Test NT -   Noble's compression test - +   Tipton's sign + +     Function:  Double Leg squat: poor form, excessive anterior tib translation  Sit <--> Stand:I   Bed Mobility: I  Step down test: +         Palpation: TTP @ lateral knee joint and distal ITB         Limitation/Restriction for FOTO TBD Survey    Therapist reviewed FOTO scores for Gurjit Valentin on 4/11/2023.   FOTO documents entered into [employer] - see Media section.    Limitation Score: TBD%         TREATMENT     Total Treatment time (time-based codes) separate from Evaluation: 15 minutes      Francisco received the treatments listed below:      therapeutic exercises to develop strength, endurance, and flexibility for 15 minutes including:  Quad sets supine with towel roll under 10x5" holds  SLR 10x5" holds  Prone quad stretch 3x30"          PATIENT EDUCATION AND HOME EXERCISES     Education provided:   - PT role and POC  - HEP    Written Home Exercises Provided: yes. Exercises were reviewed and Francisco was able to demonstrate them prior to the end of the session.  Francisco demonstrated good  understanding of the education provided. See EMR under Patient Instructions for exercises provided during therapy sessions.    ASSESSMENT     Gurjit is a 82 y.o. male referred to outpatient Physical Therapy with a medical diagnosis of acute pain of left knee. Patient presents with chronic left knee pain (reporting hx of dislocations ). Pt with pain with stair navigation, tipton's sign test, and tenderness at lateral left knee joint. Pt with signs and symptoms that could be related to chondromalacia patella and/or OA of the left knee. Pt with impaired quad strength as compared to contralateral leg. Will benefit from progressive hip and quad strengthening to improve upon symptoms.   Patient prognosis is Excellent.   Patient will benefit from skilled outpatient Physical Therapy to address the deficits stated above and in the chart below, provide " patient /family education, and to maximize patientt's level of independence.     Plan of care discussed with patient: Yes  Patient's spiritual, cultural and educational needs considered and patient is agreeable to the plan of care and goals as stated below:     Anticipated Barriers for therapy: gaps in PT treatment due to patient going out of town    Medical Necessity is demonstrated by the following  History  Co-morbidities and personal factors that may impact the plan of care Co-morbidities:   Medical History:   Past Medical History:   Diagnosis Date    Allergy     Basal cell carcinoma     Basal cell carcinoma of right cheek 03/29/2023    R mid cheek    Bladder cancer     BPH with obstruction/lower urinary tract symptoms     Cataract     Chronic cough     Coronary artery disease     Hyperlipidemia     Increased prostate specific antigen (PSA) velocity 10/15/2015    Insomnia     Kidney stone     Personal history of colonic polyps     Skin cancer     Sleep apnea     Transient global amnesia     x2       Personal Factors:   age     moderate   Examination  Body Structures and Functions, activity limitations and participation restrictions that may impact the plan of care Body Regions:   lower extremities    Body Systems:    gross symmetry  strength  gross coordinated movement  motor control    Participation Restrictions:   none    Activity limitations:   Learning and applying knowledge  no deficits    General Tasks and Commands  no deficits    Communication  no deficits    Mobility  no deficits    Self care  no deficits    Domestic Life  no deficits    Interactions/Relationships  no deficits    Life Areas  no deficits    Community and Social Life  community life  recreation and leisure         low   Clinical Presentation stable and uncomplicated low   Decision Making/ Complexity Score: low     Goals:    Goals: Short Term Goals: 3 weeks   1. Pt will report </= 2/10 pain at worst in left knee to improve tolerance to  exercise.   2. Pt will be independent with HEP to promote return to recreational activities.   3.  Pt will improve MMT B LE musculature by 1 muscle grade to improve functional mobility.       Long Term Goals: 6 weeks  1. Pt will report </= 1/10 pain in left knee to improve tolerance to exercise .  2. Pt will be independent with HEP progressions to promote return to recreational activities.  3. Pt to report no pain with stair navigation in order to improve functional mobility.   4. Pt will report return to recreational activities without limitations from left knee in order to improve upon QoL and return to PLOF.    PLAN   Plan of care Certification: 4/11/2023 to 5/23/23.    Outpatient Physical Therapy 1 times weekly for 6 weeks to include the following interventions: Gait Training, Manual Therapy, Moist Heat/ Ice, Neuromuscular Re-ed, Orthotic Management and Training, Patient Education, Self Care, Therapeutic Activities, Therapeutic Exercise, and modalities as appropriate.     Savi Cheng, PT      I CERTIFY THE NEED FOR THESE SERVICES FURNISHED UNDER THIS PLAN OF TREATMENT AND WHILE UNDER MY CARE   Physician's comments:     Physician's Signature: ___________________________________________________

## 2023-04-13 ENCOUNTER — TELEPHONE (OUTPATIENT)
Dept: INTERNAL MEDICINE | Facility: CLINIC | Age: 83
End: 2023-04-13
Payer: MEDICARE

## 2023-04-13 DIAGNOSIS — M25.562 ACUTE PAIN OF LEFT KNEE: Primary | ICD-10-CM

## 2023-04-13 NOTE — TELEPHONE ENCOUNTER
Spoke to patient. Sports Medicine appointment was made for 4/14/23. Patient verbalized understanding.

## 2023-04-13 NOTE — TELEPHONE ENCOUNTER
----- Message from Alexandrea Guan MA sent at 4/13/2023  3:28 PM CDT -----  PT making his knee pain worse. Therapist thought it was tendonitis as you did also. Referral to ortho as we discussed?

## 2023-04-13 NOTE — TELEPHONE ENCOUNTER
----- Message from Huseyin Rice MD sent at 4/13/2023  3:36 PM CDT -----  Sports Med referral in.    Thx!  Krzysztof  ----- Message -----  From: Alexandrea Guan MA  Sent: 4/13/2023   3:30 PM CDT  To: Huseyin Rice MD    PT making his knee pain worse. Therapist thought it was tendonitis as you did also. Referral to ortho as we discussed?

## 2023-04-14 ENCOUNTER — OFFICE VISIT (OUTPATIENT)
Dept: SPORTS MEDICINE | Facility: CLINIC | Age: 83
End: 2023-04-14
Payer: MEDICARE

## 2023-04-14 ENCOUNTER — HOSPITAL ENCOUNTER (OUTPATIENT)
Dept: RADIOLOGY | Facility: HOSPITAL | Age: 83
Discharge: HOME | End: 2023-04-14
Attending: PHYSICIAN ASSISTANT
Payer: MEDICARE

## 2023-04-14 VITALS
WEIGHT: 210 LBS | HEART RATE: 53 BPM | SYSTOLIC BLOOD PRESSURE: 125 MMHG | BODY MASS INDEX: 29.4 KG/M2 | DIASTOLIC BLOOD PRESSURE: 74 MMHG | HEIGHT: 71 IN

## 2023-04-14 DIAGNOSIS — M25.562 ACUTE PAIN OF LEFT KNEE: ICD-10-CM

## 2023-04-14 DIAGNOSIS — M17.12 PRIMARY OSTEOARTHRITIS OF LEFT KNEE: Primary | ICD-10-CM

## 2023-04-14 PROCEDURE — 20610 DRAIN/INJ JOINT/BURSA W/O US: CPT | Mod: S$PBB,LT,, | Performed by: PHYSICIAN ASSISTANT

## 2023-04-14 PROCEDURE — 73564 XR KNEE ORTHO BILAT WITH FLEXION: ICD-10-PCS | Mod: 26,50,, | Performed by: RADIOLOGY

## 2023-04-14 PROCEDURE — 73564 X-RAY EXAM KNEE 4 OR MORE: CPT | Mod: 26,50,, | Performed by: RADIOLOGY

## 2023-04-14 PROCEDURE — 99999 PR PBB SHADOW E&M-EST. PATIENT-LVL IV: ICD-10-PCS | Mod: PBBFAC,,, | Performed by: PHYSICIAN ASSISTANT

## 2023-04-14 PROCEDURE — 99999 PR PBB SHADOW E&M-EST. PATIENT-LVL IV: CPT | Mod: PBBFAC,,, | Performed by: PHYSICIAN ASSISTANT

## 2023-04-14 PROCEDURE — 99204 OFFICE O/P NEW MOD 45 MIN: CPT | Mod: 25,S$PBB,, | Performed by: PHYSICIAN ASSISTANT

## 2023-04-14 PROCEDURE — 20610 DRAIN/INJ JOINT/BURSA W/O US: CPT | Mod: PBBFAC,LT | Performed by: PHYSICIAN ASSISTANT

## 2023-04-14 PROCEDURE — 20610 PR DRAIN/INJECT LARGE JOINT/BURSA: ICD-10-PCS | Mod: S$PBB,LT,, | Performed by: PHYSICIAN ASSISTANT

## 2023-04-14 PROCEDURE — 99204 PR OFFICE/OUTPT VISIT, NEW, LEVL IV, 45-59 MIN: ICD-10-PCS | Mod: 25,S$PBB,, | Performed by: PHYSICIAN ASSISTANT

## 2023-04-14 PROCEDURE — 99214 OFFICE O/P EST MOD 30 MIN: CPT | Mod: PBBFAC,25 | Performed by: PHYSICIAN ASSISTANT

## 2023-04-14 PROCEDURE — 73564 X-RAY EXAM KNEE 4 OR MORE: CPT | Mod: TC,50

## 2023-04-14 RX ORDER — TRIAMCINOLONE ACETONIDE 40 MG/ML
40 INJECTION, SUSPENSION INTRA-ARTICULAR; INTRAMUSCULAR
Status: COMPLETED | OUTPATIENT
Start: 2023-04-14 | End: 2023-04-14

## 2023-04-14 RX ADMIN — TRIAMCINOLONE ACETONIDE 40 MG: 40 INJECTION, SUSPENSION INTRA-ARTICULAR; INTRAMUSCULAR at 12:04

## 2023-04-14 NOTE — PROGRESS NOTES
CC: left knee pain    82 y.o. Male [employer] who reports anterior/superior lateral knee pain refractory to conservative mgmt. 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 Pain is intermittent but mostly occurs with stepping down off a step or stairs. He started some PT for the knee and his pain got worse so he stopped.     Reports previously dislocating his left patella back in college that did not require surgery.     He reports that the pain is worse with steps.  It also bothers him at night.  Is affecting ADLs.     No mechanical symptoms, no instability    Review of Systems   Constitution: Negative. Negative for chills, fever and night sweats.   HENT: Negative for congestion and headaches.    Eyes: Negative for blurred vision, left vision loss and right vision loss.   Cardiovascular: Negative for chest pain and syncope.   Respiratory: Negative for cough and shortness of breath.    Endocrine: Negative for polydipsia, polyphagia and polyuria.   Hematologic/Lymphatic: Negative for bleeding problem. Does not bruise/bleed easily.   Skin: Negative for dry skin, itching and rash.   Musculoskeletal: Negative for falls and muscle weakness.   Gastrointestinal: Negative for abdominal pain and bowel incontinence.   Genitourinary: Negative for bladder incontinence and nocturia.   Neurological: Negative for disturbances in coordination, loss of balance and seizures.   Psychiatric/Behavioral: Negative for depression. The patient does not have insomnia.    Allergic/Immunologic: Negative for hives and persistent infections.   All other systems negative      PAST MEDICAL HISTORY:   Past Medical History:   Diagnosis Date    Allergy     Basal cell carcinoma     Basal cell carcinoma of right cheek 03/29/2023    R mid cheek    Bladder cancer     BPH with obstruction/lower urinary tract symptoms     Cataract     Chronic cough     Coronary artery disease     Hyperlipidemia     Increased prostate specific  antigen (PSA) velocity 10/15/2015    Insomnia     Kidney stone     Personal history of colonic polyps     Skin cancer     Sleep apnea     Transient global amnesia     x2     PAST SURGICAL HISTORY:   Past Surgical History:   Procedure Laterality Date    BLADDER SURGERY      Ca excision    CARDIAC CATHETERIZATION      COLONOSCOPY N/A 2017    No further screening planned.    CYSTOSCOPY      EYE SURGERY Bilateral     Cataract    KIDNEY STONE SURGERY      LITHOTRIPSY      TONSILLECTOMY      at age 4     FAMILY HISTORY:   Family History   Problem Relation Age of Onset    Hypertension Mother     Hypertension Brother     Heart attack Brother     No Known Problems Father     No Known Problems Maternal Grandmother     No Known Problems Maternal Grandfather     No Known Problems Paternal Grandmother     No Known Problems Paternal Grandfather     No Known Problems Sister     No Known Problems Maternal Aunt     No Known Problems Maternal Uncle     No Known Problems Paternal Aunt     No Known Problems Paternal Uncle     Colon polyps Neg Hx     Cancer Neg Hx     Heart disease Neg Hx     Anemia Neg Hx     Arrhythmia Neg Hx     Asthma Neg Hx     Clotting disorder Neg Hx     Fainting Neg Hx     Heart failure Neg Hx     Hyperlipidemia Neg Hx      SOCIAL HISTORY:   Social History     Socioeconomic History    Marital status:      Spouse name: Teresa   Occupational History    Occupation:  [employer]     Employer: [employer]   Tobacco Use    Smoking status: Former     Packs/day: 0.25     Years: 20.00     Pack years: 5.00     Types: Cigarettes     Quit date: 1988     Years since quittin.3    Smokeless tobacco: Never    Tobacco comments:     STOPPED 30 YRS AGO.   Substance and Sexual Activity    Alcohol use: Yes     Comment: 1 to 2 drinks most days    Drug use: No       MEDICATIONS:   Current Outpatient Medications:     desonide (DESOWEN) 0.05 % cream, Apply 1 application topically  "Daily., Disp: , Rfl:     eszopiclone (LUNESTA) 3 mg Tab, Take 1 tablet (3 mg total) by mouth every evening., Disp: 60 tablet, Rfl: 3    fluticasone propionate (FLONASE) 50 mcg/actuation nasal spray, 2 sprays (100 mcg total) by Each Nostril route once daily., Disp: 16 g, Rfl: 5    omega-3 fatty acids 300 mg Cap, Take 300 mg by mouth., Disp: , Rfl:     pseudoephedrine (SUDAFED) 120 mg 12 hr tablet, Take 120 mg by mouth every 12 (twelve) hours., Disp: , Rfl:     ramipriL (ALTACE) 5 MG capsule, Take 1 capsule (5 mg total) by mouth once daily., Disp: 90 capsule, Rfl: 3    rosuvastatin (CRESTOR) 20 MG tablet, Take 1 tablet (20 mg total) by mouth once daily., Disp: 90 tablet, Rfl: 3  No current facility-administered medications for this visit.  ALLERGIES: Review of patient's allergies indicates:  No Known Allergies    VITAL SIGNS: /74   Pulse (!) 53   Ht 5' 11" (1.803 m)   Wt 95.3 kg (210 lb)   BMI 29.29 kg/m²      PHYSICAL EXAMINATION    General:  The patient is alert and oriented x 3.  Mood is pleasant.  Observation of ears, eyes and nose reveal no gross abnormalities.  HEENT: NCAT, sclera nonicteric  Lungs: Respirations are equal and unlabored.    left  KNEE EXAMINATION     OBSERVATION / INSPECTION   Gait:   Nonantalgic   Alignment:  Neutral   Scars:   None   Muscle atrophy: Mild  Effusion:  None   Warmth:  None   Discoloration:   none     TENDERNESS / CREPITUS (T / C):          T / C      T / C   Patella   - / -   Lateral joint line   - / -      Peripatellar medial  -  Medial joint line    - / -   Peripatellar lateral +  Medial plica   - / -   Patellar tendon -   Popliteal fossa  - / -   Quad tendon   -   Gastrocnemius   -   Prepatellar Bursa - / -   Quadricep   -   Tibial tubercle  -  Thigh/hamstring  -   Pes anserine/HS -  Fibula    -   ITB   + / -  Tibia     -   Tib/fib joint  - / -  LCL    -     MFC   - / -   MCL: Proximal  -    LFC   - / -    Distal   -          ROM: (* = pain)  PASSIVE   ACTIVE "    Left :   0/ 0 / 145   0 / 0 / 145     Right :    0 / 0 / 145   0/ 0 / 145    Patellofemoral examination:  See above noted areas of tenderness.   Patella position    Subluxation / dislocation: Centered           Sup. / Inf;   Normal   Crepitus (PF):    Absent   Patellar Mobility:       Medial-lateral:   Normal    Superior-inferior:  Normal    Inferior tilt   Normal    Patellar tendon:  Normal   Lateral tilt:    Normal   J-sign:     None   Patellofemoral grind:   +       MENISCAL SIGNS:     Pain on terminal extension:  -  Pain on terminal flexion:  +  Monikas maneuver:  -  Squat     -    LIGAMENT EXAMINATION:  ACL / Lachman:  normal (-1 to 2mm)    PCL-Post.  drawer: normal 0 to 2mm  MCL- Valgus:  normal 0 to 2mm  LCL- Varus:  normal 0 to 2mm  Pivot shift: normal (Equal)   Dial Test: difference c/w other side   At 30° flexion: normal (< 5°)    At 90° flexion: normal (< 5°)   Reverse Pivot Shift:   normal (Equal)     STRENGTH: (* = with pain) PAINFUL SIDE   Quadricep   5/5   Hamstrin/5    EXTREMITY NEURO-VASCULAR EXAMINATION:   Sensation:  Grossly intact to light touch all dermatomal regions.   Motor Function:  Fully intact motor function at hip, knee, foot and ankle    DTRs;  quadriceps and  achilles 2+.  No clonus and downgoing Babinski.    Vascular status:  DP and PT pulses 2+, brisk capillary refill, symmetric.     Other Findings:  + step down bilat  + bridge test     Xrays:  Xrays of the bilateral knees with flexion were ordered and reviewed by me today. No fracture, subluxation, or other significant bony or joint abnormality is identified. Mild tricompartmental left knee OA with patellofemoral joint space narrowing on left knee.      ASSESSMENT:    1. left Knee pain, acute  2. Left knee osteoarthritis   Bilateral hip abd/core weakness    PLAN:    1. PT for patellofemoral knee pain and distal it band pain. Eval and treat with HEP  2. Ice compresses prn pain  3. PROCEDURE NOTE: left KNEE  INJECTION  After time out was performed, including verification of patient ID, procedure, site and side, availability of information and equipment, review of safety issues, and agreement with consent, the procedure site was marked and the patient was prepped aseptically. A diagnostic and therapeutic injection of 1cc 40 mg kenalog and 2cc of 1% lidocaine and 2cc of 0.25% bupivacaine was given under sterile technique using a 22g x 1.5 needle into the anterior lateral aspect of the left knee in seated position.   The patient had no adverse reactions to the medication. Pain decreased. The patient was instructed to apply ice to the joint for 20 minutes and avoid strenuous activities for 24-36 hours following the injection. Patient was warned of possible blood sugar and/or blood pressure changes during that time. Following that time, patient can resume regular activities.     4. Activity modifications discussed.   5. OTC pain control.   6. RTC if knee is not improved in 2 weeks.    All questions were answered, pt will contact us for questions or concerns in the interim.    I made the decision to obtain old records of the patient including previous notes and imaging. New imaging was ordered today of the extremity or extremities evaluated. I independently reviewed and interpreted the radiographs and/or MRIs today as well as prior imaging.

## 2023-04-17 ENCOUNTER — DOCUMENTATION ONLY (OUTPATIENT)
Dept: REHABILITATION | Facility: HOSPITAL | Age: 83
End: 2023-04-17
Payer: MEDICARE

## 2023-04-17 NOTE — PROGRESS NOTES
I am returning a call from the patient who called last week. He informed me that he feels that the exercises were making the pain worse. He called Dr. Rice who scheduled him with an orthopedist at [place]. The orthopedist did an X-ray The orthopedist also gave him a cortisone shot in the knee which helped. He was told to discontinue prone quad stretch but to continue others. Will be leaving on vacation and will let us know about remaining scheduled PT visits when he returns.     Savi Cheng, PT, DPT

## 2023-04-20 ENCOUNTER — OFFICE VISIT (OUTPATIENT)
Dept: INTERNAL MEDICINE | Facility: CLINIC | Age: 83
End: 2023-04-20
Payer: MEDICARE

## 2023-04-20 DIAGNOSIS — J01.81 OTHER ACUTE RECURRENT SINUSITIS: ICD-10-CM

## 2023-04-20 DIAGNOSIS — J34.89 SORE IN NOSE: Primary | ICD-10-CM

## 2023-04-20 PROCEDURE — 99499 NO LOS: ICD-10-PCS | Mod: 95,,, | Performed by: INTERNAL MEDICINE

## 2023-04-20 PROCEDURE — 99499 UNLISTED E&M SERVICE: CPT | Mod: 95,,, | Performed by: INTERNAL MEDICINE

## 2023-04-20 RX ORDER — MUPIROCIN 20 MG/G
OINTMENT TOPICAL 2 TIMES DAILY
Qty: 30 G | Refills: 0 | Status: SHIPPED | OUTPATIENT
Start: 2023-04-20 | End: 2023-05-04

## 2023-04-20 RX ORDER — DOXYCYCLINE HYCLATE 100 MG
100 TABLET ORAL 2 TIMES DAILY
Qty: 14 TABLET | Refills: 0 | Status: SHIPPED | OUTPATIENT
Start: 2023-04-20 | End: 2023-04-27

## 2023-04-20 NOTE — PROGRESS NOTES
Established Patient - Audio Only Telehealth Visit     The patient location is: Home  The chief complaint leading to consultation is: Nasal sore  Visit type: Virtual visit with audio only (telephone)  Total time spent with patient: 15 mins       The reason for the audio only service rather than synchronous audio and video virtual visit was related to technical difficulties or patient preference/necessity.     Each patient to whom I provide medical services by telemedicine is:  (1) informed of the relationship between the physician and patient and the respective role of any other health care provider with respect to management of the patient; and (2) notified that they may decline to receive medical services by telemedicine and may withdraw from such care at any time. Patient verbally consented to receive this service via voice-only telephone call.       HPI:   Feeling of sore in right nose. Initially around 6 wks ago. Went to urgent care and got Azithromycin. Went away but now back recently, though is a little better this past couple days. No bleeding. Does use Flonase baseline prn.  Recent right facial skin ca removal but notes this is nowhere near the area.  Confirms this does not feel like a typical sinus infection. Has the feeling of a painful sore deep in nose.  Going on trip to [facility] soon.    Past Medical History:   Diagnosis Date    Allergy     Basal cell carcinoma     Basal cell carcinoma of right cheek 03/29/2023    R mid cheek    Bladder cancer     BPH with obstruction/lower urinary tract symptoms     Cataract     Chronic cough     Coronary artery disease     Hyperlipidemia     Increased prostate specific antigen (PSA) velocity 10/15/2015    Insomnia     Kidney stone     Personal history of colonic polyps     Skin cancer     Sleep apnea     Transient global amnesia     x2      Past Surgical History:   Procedure Laterality Date    BLADDER SURGERY      Ca excision    CARDIAC CATHETERIZATION      COLONOSCOPY  N/A 2017    No further screening planned.    CYSTOSCOPY      EYE SURGERY Bilateral     Cataract    KIDNEY STONE SURGERY      LITHOTRIPSY      TONSILLECTOMY      at age 4      Social History     Tobacco Use    Smoking status: Former     Packs/day: 0.25     Years: 20.00     Pack years: 5.00     Types: Cigarettes     Quit date: 1988     Years since quittin.3    Smokeless tobacco: Never    Tobacco comments:     STOPPED 30 YRS AGO.   Substance Use Topics    Alcohol use: Yes     Comment: 1 to 2 drinks most days    Drug use: No         Assessment and plan:      1) Right nasal sore - Start Bactroban ointment bid. Providing Doxycycline. Agreed on holding off on use if symptoms improve with just ointment. Hold nasal spray use. Plan for ENT when back from [facility] trip if symptoms persist.         This service was not originating from a related E/M service provided within the previous 7 days nor will  to an E/M service or procedure within the next 24 hours or my soonest available appointment.  Prevailing standard of care was able to be met in this audio-only visit.

## 2023-05-26 DIAGNOSIS — I25.10 CORONARY ARTERY DISEASE INVOLVING NATIVE CORONARY ARTERY OF NATIVE HEART WITHOUT ANGINA PECTORIS: Primary | ICD-10-CM

## 2023-05-26 DIAGNOSIS — I25.810 ATHEROSCLEROSIS OF CORONARY ARTERY BYPASS GRAFT OF NATIVE HEART WITHOUT ANGINA PECTORIS: ICD-10-CM

## 2023-05-26 DIAGNOSIS — I70.8 ATHEROSCLEROSIS OF OTHER ARTERIES: ICD-10-CM

## 2023-06-08 NOTE — PROGRESS NOTES
Subjective:   Patient ID:  Gurjit Valentin is a 82 y.o. male who presents for follow up of Coronary Artery Disease and Hyperlipidemia      Assessment:     1. CAD without angina: Known 50% LAD stenosis: nl EF, non-obst CAD by cath. Neg Stress Echo today    2. Mixed hyperlipidemia    3. Class 2 severe obesity due to excess calories with serious comorbidity in adult, unspecified BMI    4. DELVIN (obstructive sleep apnea)        Plan:     Negative stress echo.  Reassurance.  Keep his routine visit in the fall.    HPI: Mr. Valentin had noticed some REYES when walking/hiking in [facility].  No angina or exertional discomfort. He does not some left lateral chest discomfort when sitting at his desk for prolonged periods which improves when he gets up and moves.    6- Stress Echo  The left ventricle is normal in size with normal systolic function. The estimated ejection fraction is 60%.  Normal right ventricular size with normal right ventricular systolic function.  Indeterminate left ventricular diastolic function.  Mild left atrial enlargement.  The estimated PA systolic pressure is 29 mmHg.  Normal central venous pressure (3 mmHg).  The patient's exercise capacity was normal.  The ECG portion of this study is abnormal but not diagnostic for ischemia.  The stress echo portion of this study is negative for myocardial ischemia.  Overall, this study is negative for myocardial ischemia.       10- TTE  The left ventricle is normal in size with normal systolic function. The estimated ejection fraction is 60%.  Normal right ventricular size with normal right ventricular systolic function.  Indeterminate left ventricular diastolic function.  Mild left atrial enlargement.  The estimated PA systolic pressure is 28 mmHg.  Normal central venous pressure (3 mmHg).      TTE  The left ventricle is normal in size with normal systolic function. The estimated ejection fraction is 60%.  Normal right ventricular systolic  function.  Normal left ventricular diastolic function.      Echo Stress  Normal left ventricular systolic function. The estimated ejection fraction is 65%  Normal LV diastolic function.  No wall motion abnormalities.  Normal right ventricular systolic function.  The estimated PA systolic pressure is 31 mm Hg  Normal central venous pressure (3 mm Hg).  The stress echo portion of this study is negative for myocardial ischemia. Target heart rate was achieved.  The EKG portion of this study is positive for myocardial ischemia.  Although the EKG response to exercise was positive, there was above average exercise capacity and no angina elicited. This is unchanged from prior studies.  Arrhythmias during stress: rare PVCs.  The patient's exercise capacity was above average. Achieved 12 METs.  The test was stopped because the patient experienced shortness of breath.    Review of Systems   Cardiovascular:  Negative for chest pain, claudication, cyanosis, dyspnea on exertion, irregular heartbeat, leg swelling, near-syncope, orthopnea, palpitations, paroxysmal nocturnal dyspnea and syncope.   Respiratory:  Positive for cough. Negative for shortness of breath.    Neurological:  Negative for brief paralysis, dizziness and focal weakness.     Past Medical History:   Diagnosis Date    Allergy     Basal cell carcinoma     Basal cell carcinoma of right cheek 03/29/2023    R mid cheek    Bladder cancer     BPH with obstruction/lower urinary tract symptoms     Cataract     Chronic cough     Coronary artery disease     Hyperlipidemia     Increased prostate specific antigen (PSA) velocity 10/15/2015    Insomnia     Kidney stone     Personal history of colonic polyps     Skin cancer     Sleep apnea     Transient global amnesia     x2       Past Surgical History:   Procedure Laterality Date    BLADDER SURGERY      Ca excision    CARDIAC CATHETERIZATION      COLONOSCOPY N/A 12/04/2017    No further screening planned.    CYSTOSCOPY       EYE SURGERY Bilateral     Cataract    KIDNEY STONE SURGERY      LITHOTRIPSY      TONSILLECTOMY      at age 4       Social History     Tobacco Use    Smoking status: Former     Packs/day: 0.25     Years: 20.00     Pack years: 5.00     Types: Cigarettes     Quit date: 1988     Years since quittin.4    Smokeless tobacco: Never    Tobacco comments:     STOPPED 30 YRS AGO.   Substance Use Topics    Alcohol use: Yes     Comment: 1 to 2 drinks most days    Drug use: No       Family History   Problem Relation Age of Onset    Hypertension Mother     Hypertension Brother     Heart attack Brother     No Known Problems Father     No Known Problems Maternal Grandmother     No Known Problems Maternal Grandfather     No Known Problems Paternal Grandmother     No Known Problems Paternal Grandfather     No Known Problems Sister     No Known Problems Maternal Aunt     No Known Problems Maternal Uncle     No Known Problems Paternal Aunt     No Known Problems Paternal Uncle     Colon polyps Neg Hx     Cancer Neg Hx     Heart disease Neg Hx     Anemia Neg Hx     Arrhythmia Neg Hx     Asthma Neg Hx     Clotting disorder Neg Hx     Fainting Neg Hx     Heart failure Neg Hx     Hyperlipidemia Neg Hx        Current Outpatient Medications   Medication Sig    ascorbic acid, vitamin C, (VITAMIN C) 250 MG tablet Take 250 mg by mouth once daily.    desonide (DESOWEN) 0.05 % cream Apply 1 application topically Daily.    eszopiclone (LUNESTA) 3 mg Tab Take 1 tablet (3 mg total) by mouth every evening.    omega-3 fatty acids 300 mg Cap Take 300 mg by mouth.    pseudoephedrine (SUDAFED) 120 mg 12 hr tablet Take 120 mg by mouth as needed.    ramipriL (ALTACE) 5 MG capsule Take 1 capsule (5 mg total) by mouth once daily.    rosuvastatin (CRESTOR) 20 MG tablet Take 1 tablet (20 mg total) by mouth once daily.    fluticasone propionate (FLONASE) 50 mcg/actuation nasal spray 2 sprays (100 mcg total) by Each Nostril route once daily. (Patient  "not taking: Reported on 6/13/2023)     No current facility-administered medications for this visit.       Review of patient's allergies indicates:  No Known Allergies    Objective:     /66 (BP Location: Left arm, Patient Position: Sitting, BP Method: Large (Automatic))   Pulse (!) 54   Ht 5' 11" (1.803 m)   Wt 96.4 kg (212 lb 8.4 oz)   SpO2 96%   BMI 29.64 kg/m²     Physical Exam  Vitals and nursing note reviewed.   Constitutional:       Appearance: He is well-developed.   HENT:      Head: Normocephalic and atraumatic.   Eyes:      Conjunctiva/sclera: Conjunctivae normal.      Pupils: Pupils are equal, round, and reactive to light.   Neck:      Thyroid: No thyromegaly.   Cardiovascular:      Rate and Rhythm: Normal rate and regular rhythm.      Pulses: Normal pulses.      Heart sounds: Normal heart sounds. No murmur heard.    No friction rub. No gallop.   Pulmonary:      Effort: Pulmonary effort is normal. No respiratory distress.      Breath sounds: Normal breath sounds. No wheezing or rales.   Chest:      Chest wall: No tenderness.   Abdominal:      General: Bowel sounds are normal. There is no distension.      Palpations: Abdomen is soft.      Tenderness: There is no abdominal tenderness.   Musculoskeletal:         General: Normal range of motion.      Cervical back: Normal range of motion and neck supple.   Skin:     General: Skin is warm and dry.   Neurological:      Mental Status: He is alert and oriented to person, place, and time.      Cranial Nerves: No cranial nerve deficit.      Coordination: Coordination normal.      Deep Tendon Reflexes: Reflexes are normal and symmetric.   Psychiatric:         Behavior: Behavior normal.         Thought Content: Thought content normal.         Judgment: Judgment normal.         Chemistry        Component Value Date/Time     11/04/2022 0810    K 4.6 11/04/2022 0810     11/04/2022 0810    CO2 24 11/04/2022 0810    BUN 16 11/04/2022 0810    " CREATININE 1.0 11/04/2022 0810     (H) 11/04/2022 0810        Component Value Date/Time    CALCIUM 9.1 11/04/2022 0810    ALKPHOS 34 (L) 11/04/2022 0810    AST 17 11/04/2022 0810    ALT 13 11/04/2022 0810    BILITOT 0.6 11/04/2022 0810    ESTGFRAFRICA >60.0 02/02/2022 0721    EGFRNONAA >60.0 02/02/2022 0721            Lab Results   Component Value Date    CHOL 146 11/04/2022    CHOL 146 11/04/2022    CHOL 151 10/29/2021    CHOL 151 10/29/2021     Lab Results   Component Value Date    HDL 66 11/04/2022    HDL 66 11/04/2022    HDL 80 (H) 10/29/2021    HDL 80 (H) 10/29/2021     Lab Results   Component Value Date    LDLCALC 66.4 11/04/2022    LDLCALC 66.4 11/04/2022    LDLCALC 57.8 (L) 10/29/2021    LDLCALC 57.8 (L) 10/29/2021     Lab Results   Component Value Date    TRIG 68 11/04/2022    TRIG 68 11/04/2022    TRIG 66 10/29/2021    TRIG 66 10/29/2021     Lab Results   Component Value Date    CHOLHDL 45.2 11/04/2022    CHOLHDL 45.2 11/04/2022    CHOLHDL 53.0 (H) 10/29/2021    CHOLHDL 53.0 (H) 10/29/2021         Lab Results   Component Value Date     11/04/2022    K 4.6 11/04/2022     11/04/2022    CO2 24 11/04/2022    BUN 16 11/04/2022    CREATININE 1.0 11/04/2022     (H) 11/04/2022    HGBA1C 5.8 (H) 09/20/2017    AST 17 11/04/2022    ALT 13 11/04/2022    ALBUMIN 3.8 11/04/2022    PROT 6.8 11/04/2022    BILITOT 0.6 11/04/2022    WBC 6.15 11/04/2022    HGB 14.7 11/04/2022    HCT 43.3 11/04/2022    MCV 95 11/04/2022     11/04/2022    INR 1.0 06/01/2010    PSA 1.8 11/09/2022    TSH 1.064 02/25/2013    CHOL 146 11/04/2022    CHOL 146 11/04/2022    HDL 66 11/04/2022    HDL 66 11/04/2022    LDLCALC 66.4 11/04/2022    LDLCALC 66.4 11/04/2022    TRIG 68 11/04/2022    TRIG 68 11/04/2022

## 2023-06-12 ENCOUNTER — HOSPITAL ENCOUNTER (OUTPATIENT)
Dept: CARDIOLOGY | Facility: HOSPITAL | Age: 83
Discharge: HOME | End: 2023-06-12
Attending: INTERNAL MEDICINE
Payer: MEDICARE

## 2023-06-12 ENCOUNTER — PATIENT MESSAGE (OUTPATIENT)
Dept: CARDIOLOGY | Facility: CLINIC | Age: 83
End: 2023-06-12
Payer: MEDICARE

## 2023-06-12 ENCOUNTER — PATIENT MESSAGE (OUTPATIENT)
Dept: INTERNAL MEDICINE | Facility: CLINIC | Age: 83
End: 2023-06-12
Payer: MEDICARE

## 2023-06-12 VITALS — WEIGHT: 210 LBS | HEIGHT: 71 IN | BODY MASS INDEX: 29.4 KG/M2

## 2023-06-12 DIAGNOSIS — I25.10 CORONARY ARTERY DISEASE INVOLVING NATIVE CORONARY ARTERY OF NATIVE HEART WITHOUT ANGINA PECTORIS: ICD-10-CM

## 2023-06-12 DIAGNOSIS — I25.810 ATHEROSCLEROSIS OF CORONARY ARTERY BYPASS GRAFT OF NATIVE HEART WITHOUT ANGINA PECTORIS: ICD-10-CM

## 2023-06-12 LAB
ASCENDING AORTA: 3.1 CM
BSA FOR ECHO PROCEDURE: 2.18 M2
CV ECHO LV RWT: 0.34 CM
CV STRESS BASE HR: 48 BPM
DIASTOLIC BLOOD PRESSURE: 72 MMHG
DOP CALC LVOT AREA: 4.2 CM2
DOP CALC LVOT DIAMETER: 2.3 CM
DOP CALC LVOT PEAK VEL: 1.07 M/S
DOP CALC LVOT STROKE VOLUME: 91.48 CM3
DOP CALCLVOT PEAK VEL VTI: 22.03 CM
E WAVE DECELERATION TIME: 275.82 MSEC
E/A RATIO: 0.82
E/E' RATIO: 8.93 M/S
ECHO LV POSTERIOR WALL: 0.88 CM (ref 0.6–1.1)
EJECTION FRACTION: 60 %
FRACTIONAL SHORTENING: 37 % (ref 28–44)
INTERVENTRICULAR SEPTUM: 0.89 CM (ref 0.6–1.1)
IVRT: 125.59 MSEC
LA MAJOR: 5.28 CM
LA MINOR: 5.54 CM
LA WIDTH: 4.04 CM
LEFT ATRIUM SIZE: 4.28 CM
LEFT ATRIUM VOLUME INDEX: 37 ML/M2
LEFT ATRIUM VOLUME: 79.47 CM3
LEFT INTERNAL DIMENSION IN SYSTOLE: 3.26 CM (ref 2.1–4)
LEFT VENTRICLE DIASTOLIC VOLUME INDEX: 59.73 ML/M2
LEFT VENTRICLE DIASTOLIC VOLUME: 128.41 ML
LEFT VENTRICLE MASS INDEX: 76 G/M2
LEFT VENTRICLE SYSTOLIC VOLUME INDEX: 19.9 ML/M2
LEFT VENTRICLE SYSTOLIC VOLUME: 42.83 ML
LEFT VENTRICULAR INTERNAL DIMENSION IN DIASTOLE: 5.18 CM (ref 3.5–6)
LEFT VENTRICULAR MASS: 164.26 G
LV LATERAL E/E' RATIO: 7.44 M/S
LV SEPTAL E/E' RATIO: 11.17 M/S
MV A" WAVE DURATION": 11.99 MSEC
MV PEAK A VEL: 0.82 M/S
MV PEAK E VEL: 0.67 M/S
MV STENOSIS PRESSURE HALF TIME: 79.99 MS
MV VALVE AREA P 1/2 METHOD: 2.75 CM2
OHS CV CPX 1 MINUTE RECOVERY HEART RATE: 105 BPM
OHS CV CPX 85 PERCENT MAX PREDICTED HEART RATE MALE: 116
OHS CV CPX ESTIMATED METS: 7
OHS CV CPX MAX PREDICTED HEART RATE: 137
OHS CV CPX PATIENT IS FEMALE: 0
OHS CV CPX PATIENT IS MALE: 1
OHS CV CPX PEAK DIASTOLIC BLOOD PRESSURE: 83 MMHG
OHS CV CPX PEAK HEAR RATE: 141 BPM
OHS CV CPX PEAK RATE PRESSURE PRODUCT: NORMAL
OHS CV CPX PEAK SYSTOLIC BLOOD PRESSURE: 219 MMHG
OHS CV CPX PERCENT MAX PREDICTED HEART RATE ACHIEVED: 103
OHS CV CPX RATE PRESSURE PRODUCT PRESENTING: 5760
PISA TR MAX VEL: 2.57 M/S
PULM VEIN S/D RATIO: 1.33
PV PEAK D VEL: 0.52 M/S
PV PEAK S VEL: 0.69 M/S
RA MAJOR: 5.2 CM
RA PRESSURE: 3 MMHG
RA WIDTH: 3.47 CM
RIGHT VENTRICULAR END-DIASTOLIC DIMENSION: 4 CM
SINUS: 3.57 CM
STJ: 2.98 CM
STRESS ECHO POST EXERCISE DUR MIN: 10 MINUTES
STRESS ECHO POST EXERCISE DUR SEC: 45 SECONDS
SYSTOLIC BLOOD PRESSURE: 120 MMHG
TDI LATERAL: 0.09 M/S
TDI SEPTAL: 0.06 M/S
TDI: 0.08 M/S
TR MAX PG: 26 MMHG
TRICUSPID ANNULAR PLANE SYSTOLIC EXCURSION: 2.74 CM
TV REST PULMONARY ARTERY PRESSURE: 29 MMHG

## 2023-06-12 PROCEDURE — 93351 STRESS ECHO (CUPID ONLY): ICD-10-PCS | Mod: 26,,, | Performed by: INTERNAL MEDICINE

## 2023-06-12 PROCEDURE — 93351 STRESS TTE COMPLETE: CPT

## 2023-06-12 PROCEDURE — 93351 STRESS TTE COMPLETE: CPT | Mod: 26,,, | Performed by: INTERNAL MEDICINE

## 2023-06-13 ENCOUNTER — OFFICE VISIT (OUTPATIENT)
Dept: CARDIOLOGY | Facility: CLINIC | Age: 83
End: 2023-06-13
Payer: MEDICARE

## 2023-06-13 ENCOUNTER — TELEPHONE (OUTPATIENT)
Dept: INTERNAL MEDICINE | Facility: CLINIC | Age: 83
End: 2023-06-13
Payer: MEDICARE

## 2023-06-13 VITALS
BODY MASS INDEX: 29.75 KG/M2 | OXYGEN SATURATION: 96 % | WEIGHT: 212.5 LBS | DIASTOLIC BLOOD PRESSURE: 66 MMHG | HEIGHT: 71 IN | SYSTOLIC BLOOD PRESSURE: 116 MMHG | HEART RATE: 54 BPM

## 2023-06-13 DIAGNOSIS — J32.9 SINUSITIS, UNSPECIFIED CHRONICITY, UNSPECIFIED LOCATION: Primary | ICD-10-CM

## 2023-06-13 DIAGNOSIS — E78.2 MIXED HYPERLIPIDEMIA: ICD-10-CM

## 2023-06-13 DIAGNOSIS — G47.33 OSA (OBSTRUCTIVE SLEEP APNEA): ICD-10-CM

## 2023-06-13 DIAGNOSIS — E66.01 CLASS 2 SEVERE OBESITY DUE TO EXCESS CALORIES WITH SERIOUS COMORBIDITY IN ADULT, UNSPECIFIED BMI: ICD-10-CM

## 2023-06-13 DIAGNOSIS — I25.10 CORONARY ARTERY DISEASE INVOLVING NATIVE CORONARY ARTERY OF NATIVE HEART WITHOUT ANGINA PECTORIS: Primary | ICD-10-CM

## 2023-06-13 PROCEDURE — 99213 OFFICE O/P EST LOW 20 MIN: CPT | Mod: S$PBB,,, | Performed by: INTERNAL MEDICINE

## 2023-06-13 PROCEDURE — 99999 PR PBB SHADOW E&M-EST. PATIENT-LVL III: CPT | Mod: PBBFAC,,, | Performed by: INTERNAL MEDICINE

## 2023-06-13 PROCEDURE — 99999 PR PBB SHADOW E&M-EST. PATIENT-LVL III: ICD-10-PCS | Mod: PBBFAC,,, | Performed by: INTERNAL MEDICINE

## 2023-06-13 PROCEDURE — 99213 PR OFFICE/OUTPT VISIT, EST, LEVL III, 20-29 MIN: ICD-10-PCS | Mod: S$PBB,,, | Performed by: INTERNAL MEDICINE

## 2023-06-13 PROCEDURE — 99213 OFFICE O/P EST LOW 20 MIN: CPT | Mod: PBBFAC | Performed by: INTERNAL MEDICINE

## 2023-06-13 RX ORDER — ASCORBIC ACID 250 MG
250 TABLET ORAL DAILY
COMMUNITY

## 2023-06-13 RX ORDER — BENZONATATE 200 MG/1
200 CAPSULE ORAL 3 TIMES DAILY PRN
Qty: 30 CAPSULE | Refills: 0 | Status: SHIPPED | OUTPATIENT
Start: 2023-06-13 | End: 2023-06-30

## 2023-06-13 RX ORDER — AZITHROMYCIN 250 MG/1
TABLET, FILM COATED ORAL
Qty: 6 TABLET | Refills: 0 | Status: SHIPPED | OUTPATIENT
Start: 2023-06-13 | End: 2023-06-18

## 2023-06-13 NOTE — TELEPHONE ENCOUNTER
----- Message from Michelle Abbott sent at 6/13/2023 10:32 AM CDT -----  Regarding: cough    Mr Valentin has a cough and would like for you to send in a cough medicine to [facility]. He just went to see Dr Tucker and was told his lungs are clear.     Thanks  Michelle

## 2023-06-13 NOTE — TELEPHONE ENCOUNTER
Acute sinusitis and associated cough. Agreed on tessalon pearles prn. Has Flonase. Going on trip soon. Agreed on sending Z-mary. Will use if symptoms worsen or persist.

## 2023-06-30 ENCOUNTER — TELEPHONE (OUTPATIENT)
Dept: INTERNAL MEDICINE | Facility: CLINIC | Age: 83
End: 2023-06-30
Payer: MEDICARE

## 2023-06-30 DIAGNOSIS — J32.9 SINUSITIS, UNSPECIFIED CHRONICITY, UNSPECIFIED LOCATION: ICD-10-CM

## 2023-06-30 DIAGNOSIS — R05.9 COUGH, UNSPECIFIED TYPE: Primary | ICD-10-CM

## 2023-06-30 RX ORDER — BENZONATATE 200 MG/1
200 CAPSULE ORAL 3 TIMES DAILY PRN
Qty: 30 CAPSULE | Refills: 0 | Status: SHIPPED | OUTPATIENT
Start: 2023-06-30 | End: 2023-07-10

## 2023-06-30 RX ORDER — PROMETHAZINE HYDROCHLORIDE AND DEXTROMETHORPHAN HYDROBROMIDE 6.25; 15 MG/5ML; MG/5ML
5 SYRUP ORAL EVERY 8 HOURS PRN
Qty: 180 ML | Refills: 0 | Status: SHIPPED | OUTPATIENT
Start: 2023-06-30 | End: 2023-08-07

## 2023-06-30 NOTE — TELEPHONE ENCOUNTER
----- Message from Sveta Carranza LPN sent at 6/30/2023  3:13 PM CDT -----  Patient would like a cough medication sent to his [facility] pharmacy .

## 2023-08-07 ENCOUNTER — TELEPHONE (OUTPATIENT)
Dept: INTERNAL MEDICINE | Facility: CLINIC | Age: 83
End: 2023-08-07
Payer: MEDICARE

## 2023-08-07 DIAGNOSIS — J32.9 SINUSITIS, UNSPECIFIED CHRONICITY, UNSPECIFIED LOCATION: Primary | ICD-10-CM

## 2023-08-07 DIAGNOSIS — J31.0 RHINITIS, UNSPECIFIED TYPE: ICD-10-CM

## 2023-08-07 DIAGNOSIS — R05.9 COUGH, UNSPECIFIED TYPE: ICD-10-CM

## 2023-08-07 RX ORDER — PROMETHAZINE HYDROCHLORIDE AND DEXTROMETHORPHAN HYDROBROMIDE 6.25; 15 MG/5ML; MG/5ML
5 SYRUP ORAL EVERY 8 HOURS PRN
Qty: 180 ML | Refills: 0 | Status: SHIPPED | OUTPATIENT
Start: 2023-08-07

## 2023-08-07 RX ORDER — AMOXICILLIN AND CLAVULANATE POTASSIUM 875; 125 MG/1; MG/1
1 TABLET, FILM COATED ORAL 2 TIMES DAILY
Qty: 14 TABLET | Refills: 0 | Status: SHIPPED | OUTPATIENT
Start: 2023-08-07 | End: 2023-08-14

## 2023-08-07 RX ORDER — FLUTICASONE PROPIONATE 50 MCG
2 SPRAY, SUSPENSION (ML) NASAL DAILY
Qty: 16 G | Refills: 5 | Status: SHIPPED | OUTPATIENT
Start: 2023-08-07

## 2023-08-07 NOTE — TELEPHONE ENCOUNTER
----- Message from Sveta Carranza LPN sent at 8/7/2023 10:51 AM CDT -----  Patient called to say he is back with the cough and feeling like he has a cold. He states he finished the Z pack and was taking the cough medication you gave him. He felt good in [place] but feels bad now that he is back home. Would like parisi cough medication and to speak to you. He said you can call him after 3 today to discuss .

## 2023-08-08 NOTE — TELEPHONE ENCOUNTER
Discussed with pt yesterday. Agreed on abx but also checking covid test. Test came back positive after our discussion. My partner sent in Paxlovid course. Has our direct contact for updates.

## 2023-08-11 DIAGNOSIS — G47.00 INSOMNIA, UNSPECIFIED TYPE: ICD-10-CM

## 2023-08-11 RX ORDER — ESZOPICLONE 3 MG/1
3 TABLET, FILM COATED ORAL NIGHTLY PRN
Qty: 30 TABLET | Refills: 2 | Status: SHIPPED | OUTPATIENT
Start: 2023-08-11 | End: 2023-11-08 | Stop reason: SDUPTHER

## 2023-08-11 NOTE — TELEPHONE ENCOUNTER
No care due was identified.  [facility] Embedded Care Due Messages. Reference number: [id number].   8/11/2023 9:01:26 AM CDT

## 2023-09-14 DIAGNOSIS — E78.2 MIXED HYPERLIPIDEMIA: Primary | ICD-10-CM

## 2023-10-27 DIAGNOSIS — E78.2 MIXED HYPERLIPIDEMIA: Primary | ICD-10-CM

## 2023-11-08 DIAGNOSIS — G47.00 INSOMNIA, UNSPECIFIED TYPE: ICD-10-CM

## 2023-11-08 RX ORDER — ESZOPICLONE 3 MG/1
3 TABLET, FILM COATED ORAL NIGHTLY PRN
Qty: 30 TABLET | Refills: 2 | Status: SHIPPED | OUTPATIENT
Start: 2023-11-08 | End: 2023-12-11 | Stop reason: SDUPTHER

## 2023-11-08 NOTE — TELEPHONE ENCOUNTER
No care due was identified.  [facility] Embedded Care Due Messages. Reference number: [id number].   11/08/2023 8:45:28 AM CST

## 2023-11-16 ENCOUNTER — TELEPHONE (OUTPATIENT)
Dept: INTERNAL MEDICINE | Facility: CLINIC | Age: 83
End: 2023-11-16
Payer: MEDICARE

## 2023-11-16 DIAGNOSIS — I10 PRIMARY HYPERTENSION: ICD-10-CM

## 2023-11-16 DIAGNOSIS — R39.9 LOWER URINARY TRACT SYMPTOMS (LUTS): Primary | ICD-10-CM

## 2023-11-16 DIAGNOSIS — D89.2 PARAPROTEINEMIA: ICD-10-CM

## 2023-11-16 DIAGNOSIS — E78.5 HYPERLIPIDEMIA, UNSPECIFIED HYPERLIPIDEMIA TYPE: ICD-10-CM

## 2023-11-16 DIAGNOSIS — R73.02 IGT (IMPAIRED GLUCOSE TOLERANCE): ICD-10-CM

## 2023-11-16 DIAGNOSIS — Z12.5 PROSTATE CANCER SCREENING: ICD-10-CM

## 2023-11-16 DIAGNOSIS — R35.1 NOCTURIA: ICD-10-CM

## 2023-11-16 NOTE — TELEPHONE ENCOUNTER
----- Message from Lisa De La Cruz sent at 11/14/2023  9:51 AM CST -----  Regarding: labs  Mr. Valentin is coming in for his annual on 12/11 but having his labs done on 12/4.  Can you please put in lab orders so I can attach?    Thanks,

## 2023-12-04 ENCOUNTER — CLINICAL SUPPORT (OUTPATIENT)
Dept: INTERNAL MEDICINE | Facility: CLINIC | Age: 83
End: 2023-12-04
Payer: MEDICARE

## 2023-12-04 DIAGNOSIS — E78.2 MIXED HYPERLIPIDEMIA: ICD-10-CM

## 2023-12-04 DIAGNOSIS — E78.5 HYPERLIPIDEMIA, UNSPECIFIED HYPERLIPIDEMIA TYPE: ICD-10-CM

## 2023-12-04 DIAGNOSIS — R39.9 LOWER URINARY TRACT SYMPTOMS (LUTS): ICD-10-CM

## 2023-12-04 DIAGNOSIS — I10 PRIMARY HYPERTENSION: ICD-10-CM

## 2023-12-04 DIAGNOSIS — D89.2 PARAPROTEINEMIA: ICD-10-CM

## 2023-12-04 DIAGNOSIS — R73.02 IGT (IMPAIRED GLUCOSE TOLERANCE): ICD-10-CM

## 2023-12-04 DIAGNOSIS — R35.1 NOCTURIA: ICD-10-CM

## 2023-12-04 LAB
ALBUMIN SERPL BCP-MCNC: 3.9 G/DL (ref 3.5–5.2)
ALP SERPL-CCNC: 36 U/L (ref 55–135)
ALT SERPL W/O P-5'-P-CCNC: 14 U/L (ref 10–44)
ANION GAP SERPL CALC-SCNC: 11 MMOL/L (ref 8–16)
AST SERPL-CCNC: 16 U/L (ref 10–40)
BASOPHILS # BLD AUTO: 0.07 K/UL (ref 0–0.2)
BASOPHILS NFR BLD: 1 % (ref 0–1.9)
BILIRUB SERPL-MCNC: 0.6 MG/DL (ref 0.1–1)
BUN SERPL-MCNC: 17 MG/DL (ref 8–23)
CALCIUM SERPL-MCNC: 9.9 MG/DL (ref 8.7–10.5)
CHLORIDE SERPL-SCNC: 110 MMOL/L (ref 95–110)
CHOLEST SERPL-MCNC: 159 MG/DL (ref 120–199)
CHOLEST/HDLC SERPL: 2.4 {RATIO} (ref 2–5)
CO2 SERPL-SCNC: 25 MMOL/L (ref 23–29)
COMPLEXED PSA SERPL-MCNC: 1.8 NG/ML (ref 0–4)
CREAT SERPL-MCNC: 1 MG/DL (ref 0.5–1.4)
DIFFERENTIAL METHOD: ABNORMAL
EOSINOPHIL # BLD AUTO: 0.4 K/UL (ref 0–0.5)
EOSINOPHIL NFR BLD: 5.8 % (ref 0–8)
ERYTHROCYTE [DISTWIDTH] IN BLOOD BY AUTOMATED COUNT: 13.8 % (ref 11.5–14.5)
EST. GFR  (NO RACE VARIABLE): >60 ML/MIN/1.73 M^2
ESTIMATED AVG GLUCOSE: 128 MG/DL (ref 68–131)
GLUCOSE SERPL-MCNC: 118 MG/DL (ref 70–110)
HBA1C MFR BLD: 6.1 % (ref 4–5.6)
HCT VFR BLD AUTO: 43.1 % (ref 40–54)
HDLC SERPL-MCNC: 65 MG/DL (ref 40–75)
HDLC SERPL: 40.9 % (ref 20–50)
HGB BLD-MCNC: 14.4 G/DL (ref 14–18)
IMM GRANULOCYTES # BLD AUTO: 0.02 K/UL (ref 0–0.04)
IMM GRANULOCYTES NFR BLD AUTO: 0.3 % (ref 0–0.5)
LDLC SERPL CALC-MCNC: 77.6 MG/DL (ref 63–159)
LYMPHOCYTES # BLD AUTO: 1.6 K/UL (ref 1–4.8)
LYMPHOCYTES NFR BLD: 23.1 % (ref 18–48)
MCH RBC QN AUTO: 32.2 PG (ref 27–31)
MCHC RBC AUTO-ENTMCNC: 33.4 G/DL (ref 32–36)
MCV RBC AUTO: 96 FL (ref 82–98)
MONOCYTES # BLD AUTO: 0.8 K/UL (ref 0.3–1)
MONOCYTES NFR BLD: 11.9 % (ref 4–15)
NEUTROPHILS # BLD AUTO: 3.9 K/UL (ref 1.8–7.7)
NEUTROPHILS NFR BLD: 57.9 % (ref 38–73)
NONHDLC SERPL-MCNC: 94 MG/DL
NRBC BLD-RTO: 0 /100 WBC
PLATELET # BLD AUTO: 204 K/UL (ref 150–450)
PMV BLD AUTO: 10.8 FL (ref 9.2–12.9)
POTASSIUM SERPL-SCNC: 4.6 MMOL/L (ref 3.5–5.1)
PROT SERPL-MCNC: 7.2 G/DL (ref 6–8.4)
RBC # BLD AUTO: 4.47 M/UL (ref 4.6–6.2)
SODIUM SERPL-SCNC: 146 MMOL/L (ref 136–145)
TRIGL SERPL-MCNC: 82 MG/DL (ref 30–150)
TSH SERPL DL<=0.005 MIU/L-ACNC: 2.4 UIU/ML (ref 0.4–4)
WBC # BLD AUTO: 6.71 K/UL (ref 3.9–12.7)

## 2023-12-04 PROCEDURE — 84165 PATHOLOGIST INTERPRETATION SPE: ICD-10-PCS | Mod: 26,,, | Performed by: PATHOLOGY

## 2023-12-04 PROCEDURE — 86334 PATHOLOGIST INTERPRETATION IFE: ICD-10-PCS | Mod: 26,,, | Performed by: PATHOLOGY

## 2023-12-04 PROCEDURE — 36415 COLL VENOUS BLD VENIPUNCTURE: CPT | Performed by: INTERNAL MEDICINE

## 2023-12-04 PROCEDURE — 80061 LIPID PANEL: CPT | Performed by: INTERNAL MEDICINE

## 2023-12-04 PROCEDURE — 86334 IMMUNOFIX E-PHORESIS SERUM: CPT | Mod: 26,,, | Performed by: PATHOLOGY

## 2023-12-04 PROCEDURE — 86334 IMMUNOFIX E-PHORESIS SERUM: CPT | Performed by: INTERNAL MEDICINE

## 2023-12-04 PROCEDURE — 84443 ASSAY THYROID STIM HORMONE: CPT | Performed by: INTERNAL MEDICINE

## 2023-12-04 PROCEDURE — 84165 PROTEIN E-PHORESIS SERUM: CPT | Mod: 26,,, | Performed by: PATHOLOGY

## 2023-12-04 PROCEDURE — 83521 IG LIGHT CHAINS FREE EACH: CPT | Mod: 59 | Performed by: INTERNAL MEDICINE

## 2023-12-04 PROCEDURE — 83036 HEMOGLOBIN GLYCOSYLATED A1C: CPT | Performed by: INTERNAL MEDICINE

## 2023-12-04 PROCEDURE — 84153 ASSAY OF PSA TOTAL: CPT | Performed by: INTERNAL MEDICINE

## 2023-12-04 PROCEDURE — 84165 PROTEIN E-PHORESIS SERUM: CPT | Performed by: INTERNAL MEDICINE

## 2023-12-04 PROCEDURE — 80053 COMPREHEN METABOLIC PANEL: CPT | Performed by: INTERNAL MEDICINE

## 2023-12-04 PROCEDURE — 85025 COMPLETE CBC W/AUTO DIFF WBC: CPT | Performed by: INTERNAL MEDICINE

## 2023-12-04 NOTE — PROGRESS NOTES
Subjective:   Patient ID:  Gurjit Valentin is a 83 y.o. male who presents for follow up of Coronary Artery Disease and Hyperlipidemia      Assessment:     1. CAD without angina: Known 50% LAD stenosis: nl EF, non-obst CAD by cath.    2. Mixed hyperlipidemia: LDL not at goal on meds. 77.6    3. Class 2 severe obesity due to excess calories with serious comorbidity in adult, unspecified BMI    4. DELVIN (obstructive sleep apnea)        Plan:     Increase ramipril to 10 mg daily and keep track of BP. [facility]  RTC 1 yr with FLP and BMP.    HPI: Doing well, but not exercising as much as he did when in [place] for the summer.  No angina or heart failure symptoms. Eating more animal protein than he should. Has gained weight which may have impacted his BP which is averaging slightly higher than 140 systolic.     6- Stress Echo  The left ventricle is normal in size with normal systolic function. The estimated ejection fraction is 60%.  Normal right ventricular size with normal right ventricular systolic function.  Indeterminate left ventricular diastolic function.  Mild left atrial enlargement.  The estimated PA systolic pressure is 29 mmHg.  Normal central venous pressure (3 mmHg).  The patient's exercise capacity was normal.  The ECG portion of this study is abnormal but not diagnostic for ischemia.  The stress echo portion of this study is negative for myocardial ischemia.  Overall, this study is negative for myocardial ischemia.        10- TTE  The left ventricle is normal in size with normal systolic function. The estimated ejection fraction is 60%.  Normal right ventricular size with normal right ventricular systolic function.  Indeterminate left ventricular diastolic function.  Mild left atrial enlargement.  The estimated PA systolic pressure is 28 mmHg.  Normal central venous pressure (3 mmHg).      TTE  The left ventricle is normal in size with normal systolic function. The  estimated ejection fraction is 60%.  Normal right ventricular systolic function.  Normal left ventricular diastolic function.      Echo Stress  Normal left ventricular systolic function. The estimated ejection fraction is 65%  Normal LV diastolic function.  No wall motion abnormalities.  Normal right ventricular systolic function.  The estimated PA systolic pressure is 31 mm Hg  Normal central venous pressure (3 mm Hg).  The stress echo portion of this study is negative for myocardial ischemia. Target heart rate was achieved.  The EKG portion of this study is positive for myocardial ischemia.  Although the EKG response to exercise was positive, there was above average exercise capacity and no angina elicited. This is unchanged from prior studies.  Arrhythmias during stress: rare PVCs.  The patient's exercise capacity was above average. Achieved 12 METs.  The test was stopped because the patient experienced shortness of breath.    Review of Systems   Cardiovascular:  Negative for chest pain, claudication, cyanosis, dyspnea on exertion, irregular heartbeat, leg swelling, near-syncope, orthopnea, palpitations, paroxysmal nocturnal dyspnea and syncope.   Respiratory:  Negative for shortness of breath.    Musculoskeletal:  Positive for joint pain.   Neurological:  Negative for brief paralysis, dizziness and focal weakness.       Past Medical History:   Diagnosis Date    Allergy     Basal cell carcinoma     Basal cell carcinoma of right cheek 03/29/2023    R mid cheek    Bladder cancer     BPH with obstruction/lower urinary tract symptoms     Cataract     Chronic cough     Coronary artery disease     Hyperlipidemia     Increased prostate specific antigen (PSA) velocity 10/15/2015    Insomnia     Kidney stone     Personal history of colonic polyps     Skin cancer     Sleep apnea     Transient global amnesia     x2       Past Surgical History:   Procedure Laterality Date    BLADDER SURGERY      Ca excision    CARDIAC  CATHETERIZATION      COLONOSCOPY N/A 2017    No further screening planned.    CYSTOSCOPY      EYE SURGERY Bilateral     Cataract    KIDNEY STONE SURGERY      LITHOTRIPSY      TONSILLECTOMY      at age 4       Social History     Tobacco Use    Smoking status: Former     Current packs/day: 0.00     Average packs/day: 0.3 packs/day for 20.0 years (5.0 ttl pk-yrs)     Types: Cigarettes     Start date: 1968     Quit date: 1988     Years since quittin.9     Passive exposure: Never    Smokeless tobacco: Never    Tobacco comments:     STOPPED 30 YRS AGO.   Substance Use Topics    Alcohol use: Yes     Comment: 1 to 2 drinks most days    Drug use: No       Family History   Problem Relation Age of Onset    Hypertension Mother     Hypertension Brother     Heart attack Brother     No Known Problems Father     No Known Problems Maternal Grandmother     No Known Problems Maternal Grandfather     No Known Problems Paternal Grandmother     No Known Problems Paternal Grandfather     No Known Problems Sister     No Known Problems Maternal Aunt     No Known Problems Maternal Uncle     No Known Problems Paternal Aunt     No Known Problems Paternal Uncle     Colon polyps Neg Hx     Cancer Neg Hx     Heart disease Neg Hx     Anemia Neg Hx     Arrhythmia Neg Hx     Asthma Neg Hx     Clotting disorder Neg Hx     Fainting Neg Hx     Heart failure Neg Hx     Hyperlipidemia Neg Hx        Current Outpatient Medications   Medication Sig    ascorbic acid, vitamin C, (VITAMIN C) 250 MG tablet Take 250 mg by mouth once daily.    desonide (DESOWEN) 0.05 % cream Apply 1 application topically Daily.    eszopiclone (LUNESTA) 3 mg Tab Take 1 tablet (3 mg total) by mouth nightly as needed (Insomnia).    fluticasone propionate (FLONASE) 50 mcg/actuation nasal spray 2 sprays (100 mcg total) by Each Nostril route once daily.    omega-3 fatty acids 300 mg Cap Take 300 mg by mouth.    promethazine-dextromethorphan (PROMETHAZINE-DM) 6.25-15  "mg/5 mL Syrp Take 5 mLs by mouth every 8 (eight) hours as needed (Cough).    pseudoephedrine (SUDAFED) 120 mg 12 hr tablet Take 120 mg by mouth as needed.    ramipriL (ALTACE) 5 MG capsule Take 1 capsule (5 mg total) by mouth once daily.    rosuvastatin (CRESTOR) 20 MG tablet Take 1 tablet (20 mg total) by mouth once daily.     No current facility-administered medications for this visit.       Review of patient's allergies indicates:  No Known Allergies    Objective:     /76 (BP Location: Right arm, Patient Position: Sitting, BP Method: Large (Automatic))   Pulse (!) 50   Ht 5' 11" (1.803 m)   Wt 97.3 kg (214 lb 8.1 oz)   SpO2 98%   BMI 29.92 kg/m²     Physical Exam  Vitals and nursing note reviewed.   Constitutional:       Appearance: He is well-developed. He is obese.   HENT:      Head: Normocephalic and atraumatic.   Eyes:      Conjunctiva/sclera: Conjunctivae normal.      Pupils: Pupils are equal, round, and reactive to light.   Neck:      Thyroid: No thyromegaly.   Cardiovascular:      Rate and Rhythm: Normal rate and regular rhythm.      Pulses: Normal pulses.      Heart sounds: Normal heart sounds. No murmur heard.     No friction rub. No gallop.   Pulmonary:      Effort: Pulmonary effort is normal. No respiratory distress.      Breath sounds: Normal breath sounds. No wheezing or rales.   Chest:      Chest wall: No tenderness.   Abdominal:      General: Bowel sounds are normal. There is no distension.      Palpations: Abdomen is soft.      Tenderness: There is no abdominal tenderness.   Musculoskeletal:         General: Normal range of motion.      Cervical back: Normal range of motion and neck supple.   Skin:     General: Skin is warm and dry.   Neurological:      Mental Status: He is alert and oriented to person, place, and time.      Cranial Nerves: No cranial nerve deficit.      Coordination: Coordination normal.      Deep Tendon Reflexes: Reflexes are normal and symmetric.   Psychiatric:       "   Behavior: Behavior normal.         Thought Content: Thought content normal.         Judgment: Judgment normal.           Chemistry        Component Value Date/Time     (H) 12/04/2023 0742    K 4.6 12/04/2023 0742     12/04/2023 0742    CO2 25 12/04/2023 0742    BUN 17 12/04/2023 0742    CREATININE 1.0 12/04/2023 0742     (H) 12/04/2023 0742        Component Value Date/Time    CALCIUM 9.9 12/04/2023 0742    ALKPHOS 36 (L) 12/04/2023 0742    AST 16 12/04/2023 0742    ALT 14 12/04/2023 0742    BILITOT 0.6 12/04/2023 0742    ESTGFRAFRICA >60.0 02/02/2022 0721    EGFRNONAA >60.0 02/02/2022 0721            Lab Results   Component Value Date    CHOL 159 12/04/2023    CHOL 146 11/04/2022    CHOL 146 11/04/2022     Lab Results   Component Value Date    HDL 65 12/04/2023    HDL 66 11/04/2022    HDL 66 11/04/2022     Lab Results   Component Value Date    LDLCALC 77.6 12/04/2023    LDLCALC 66.4 11/04/2022    LDLCALC 66.4 11/04/2022     Lab Results   Component Value Date    TRIG 82 12/04/2023    TRIG 68 11/04/2022    TRIG 68 11/04/2022     Lab Results   Component Value Date    CHOLHDL 40.9 12/04/2023    CHOLHDL 45.2 11/04/2022    CHOLHDL 45.2 11/04/2022         Lab Results   Component Value Date     (H) 12/04/2023    K 4.6 12/04/2023     12/04/2023    CO2 25 12/04/2023    BUN 17 12/04/2023    CREATININE 1.0 12/04/2023     (H) 12/04/2023    HGBA1C 6.1 (H) 12/04/2023    AST 16 12/04/2023    ALT 14 12/04/2023    ALBUMIN 3.9 12/04/2023    PROT 7.2 12/04/2023    BILITOT 0.6 12/04/2023    WBC 6.71 12/04/2023    HGB 14.4 12/04/2023    HCT 43.1 12/04/2023    MCV 96 12/04/2023     12/04/2023    INR 1.0 06/01/2010    PSA 1.8 11/09/2022    TSH 2.395 12/04/2023    CHOL 159 12/04/2023    HDL 65 12/04/2023    LDLCALC 77.6 12/04/2023    TRIG 82 12/04/2023

## 2023-12-05 ENCOUNTER — OFFICE VISIT (OUTPATIENT)
Dept: CARDIOLOGY | Facility: CLINIC | Age: 83
End: 2023-12-05
Payer: MEDICARE

## 2023-12-05 VITALS
WEIGHT: 214.5 LBS | BODY MASS INDEX: 30.03 KG/M2 | HEIGHT: 71 IN | HEART RATE: 50 BPM | DIASTOLIC BLOOD PRESSURE: 76 MMHG | OXYGEN SATURATION: 98 % | SYSTOLIC BLOOD PRESSURE: 135 MMHG

## 2023-12-05 DIAGNOSIS — E66.01 CLASS 2 SEVERE OBESITY DUE TO EXCESS CALORIES WITH SERIOUS COMORBIDITY IN ADULT, UNSPECIFIED BMI: ICD-10-CM

## 2023-12-05 DIAGNOSIS — I25.10 CORONARY ARTERY DISEASE INVOLVING NATIVE CORONARY ARTERY WITHOUT ANGINA PECTORIS, UNSPECIFIED WHETHER NATIVE OR TRANSPLANTED HEART: ICD-10-CM

## 2023-12-05 DIAGNOSIS — G47.33 OSA (OBSTRUCTIVE SLEEP APNEA): ICD-10-CM

## 2023-12-05 DIAGNOSIS — I10 PRIMARY HYPERTENSION: ICD-10-CM

## 2023-12-05 DIAGNOSIS — E78.2 MIXED HYPERLIPIDEMIA: ICD-10-CM

## 2023-12-05 DIAGNOSIS — I25.10 CORONARY ARTERY DISEASE INVOLVING NATIVE CORONARY ARTERY OF NATIVE HEART WITHOUT ANGINA PECTORIS: Primary | ICD-10-CM

## 2023-12-05 LAB
ALBUMIN SERPL ELPH-MCNC: 4.18 G/DL (ref 3.35–5.55)
ALPHA1 GLOB SERPL ELPH-MCNC: 0.28 G/DL (ref 0.17–0.41)
ALPHA2 GLOB SERPL ELPH-MCNC: 0.67 G/DL (ref 0.43–0.99)
B-GLOBULIN SERPL ELPH-MCNC: 0.75 G/DL (ref 0.5–1.1)
GAMMA GLOB SERPL ELPH-MCNC: 0.92 G/DL (ref 0.67–1.58)
INTERPRETATION SERPL IFE-IMP: NORMAL
KAPPA LC SER QL IA: 3.23 MG/DL (ref 0.33–1.94)
KAPPA LC/LAMBDA SER IA: 1.5 (ref 0.26–1.65)
LAMBDA LC SER QL IA: 2.16 MG/DL (ref 0.57–2.63)
PATHOLOGIST INTERPRETATION IFE: NORMAL
PATHOLOGIST INTERPRETATION SPE: NORMAL
PROT SERPL-MCNC: 6.8 G/DL (ref 6–8.4)

## 2023-12-05 PROCEDURE — 99999 PR PBB SHADOW E&M-EST. PATIENT-LVL III: ICD-10-PCS | Mod: PBBFAC,,, | Performed by: INTERNAL MEDICINE

## 2023-12-05 PROCEDURE — 99213 OFFICE O/P EST LOW 20 MIN: CPT | Mod: S$PBB,,, | Performed by: INTERNAL MEDICINE

## 2023-12-05 PROCEDURE — 99213 OFFICE O/P EST LOW 20 MIN: CPT | Mod: PBBFAC | Performed by: INTERNAL MEDICINE

## 2023-12-05 PROCEDURE — 99999 PR PBB SHADOW E&M-EST. PATIENT-LVL III: CPT | Mod: PBBFAC,,, | Performed by: INTERNAL MEDICINE

## 2023-12-05 PROCEDURE — 99213 PR OFFICE/OUTPT VISIT, EST, LEVL III, 20-29 MIN: ICD-10-PCS | Mod: S$PBB,,, | Performed by: INTERNAL MEDICINE

## 2023-12-05 RX ORDER — RAMIPRIL 5 MG/1
5 CAPSULE ORAL DAILY
Qty: 90 CAPSULE | Refills: 3 | Status: SHIPPED | OUTPATIENT
Start: 2023-12-05 | End: 2023-12-11 | Stop reason: SDUPTHER

## 2023-12-11 ENCOUNTER — TELEPHONE (OUTPATIENT)
Dept: DERMATOLOGY | Facility: CLINIC | Age: 83
End: 2023-12-11
Payer: MEDICARE

## 2023-12-11 ENCOUNTER — OFFICE VISIT (OUTPATIENT)
Dept: INTERNAL MEDICINE | Facility: CLINIC | Age: 83
End: 2023-12-11
Payer: MEDICARE

## 2023-12-11 VITALS
HEART RATE: 60 BPM | DIASTOLIC BLOOD PRESSURE: 76 MMHG | HEIGHT: 71 IN | SYSTOLIC BLOOD PRESSURE: 121 MMHG | BODY MASS INDEX: 30.8 KG/M2 | WEIGHT: 220 LBS

## 2023-12-11 DIAGNOSIS — I10 PRIMARY HYPERTENSION: ICD-10-CM

## 2023-12-11 DIAGNOSIS — Z00.00 ENCOUNTER FOR ANNUAL HEALTH EXAMINATION: Primary | ICD-10-CM

## 2023-12-11 DIAGNOSIS — J31.0 RHINITIS, UNSPECIFIED TYPE: ICD-10-CM

## 2023-12-11 DIAGNOSIS — I25.10 CORONARY ARTERY DISEASE INVOLVING NATIVE CORONARY ARTERY WITHOUT ANGINA PECTORIS, UNSPECIFIED WHETHER NATIVE OR TRANSPLANTED HEART: ICD-10-CM

## 2023-12-11 DIAGNOSIS — G47.00 INSOMNIA, UNSPECIFIED TYPE: ICD-10-CM

## 2023-12-11 DIAGNOSIS — E78.2 MIXED HYPERLIPIDEMIA: ICD-10-CM

## 2023-12-11 DIAGNOSIS — L30.9 DERMATITIS: ICD-10-CM

## 2023-12-11 DIAGNOSIS — E66.01 CLASS 2 SEVERE OBESITY DUE TO EXCESS CALORIES WITH SERIOUS COMORBIDITY IN ADULT, UNSPECIFIED BMI: ICD-10-CM

## 2023-12-11 DIAGNOSIS — C67.9 MALIGNANT NEOPLASM OF URINARY BLADDER, UNSPECIFIED SITE: ICD-10-CM

## 2023-12-11 PROCEDURE — 99999 PR PBB SHADOW E&M-EST. PATIENT-LVL III: CPT | Mod: PBBFAC,,, | Performed by: INTERNAL MEDICINE

## 2023-12-11 PROCEDURE — 99214 PR OFFICE/OUTPT VISIT, EST, LEVL IV, 30-39 MIN: ICD-10-PCS | Mod: S$PBB,,, | Performed by: INTERNAL MEDICINE

## 2023-12-11 PROCEDURE — 99213 OFFICE O/P EST LOW 20 MIN: CPT | Mod: PBBFAC | Performed by: INTERNAL MEDICINE

## 2023-12-11 PROCEDURE — 99214 OFFICE O/P EST MOD 30 MIN: CPT | Mod: S$PBB,,, | Performed by: INTERNAL MEDICINE

## 2023-12-11 PROCEDURE — 99999 PR PBB SHADOW E&M-EST. PATIENT-LVL III: ICD-10-PCS | Mod: PBBFAC,,, | Performed by: INTERNAL MEDICINE

## 2023-12-11 RX ORDER — ESZOPICLONE 3 MG/1
3 TABLET, FILM COATED ORAL NIGHTLY PRN
Qty: 30 TABLET | Refills: 2 | Status: SHIPPED | OUTPATIENT
Start: 2023-12-11 | End: 2024-03-10 | Stop reason: SDUPTHER

## 2023-12-11 RX ORDER — RAMIPRIL 5 MG/1
5 CAPSULE ORAL 2 TIMES DAILY
Qty: 180 CAPSULE | Refills: 3 | Status: SHIPPED | OUTPATIENT
Start: 2023-12-11

## 2023-12-11 NOTE — PROGRESS NOTES
Subjective:       Patient ID: Gurjit Valentin is a 83 y.o. male.    Chief Complaint: Annual Exam (Increase in BP last week, saw Dr. Tucker)      HPI  Annual health exam. Reviewed medical, surgical, social and family history, medications, appropriate preventive health screenings, as well as vaccination history. Updates as noted below or in assessment and plan.    Generally feeling well. Weight is up. Has been talking with others about Mounjaro but admits some room to work on exercise and diet. Has been walking but not as much cardio (eg elliptical). Drinking 2 to 3 alcohol drinks most nights. Saw Cards last week for f/u. LDL slightly above 70 and had borderline BP. Cards increased Ramipril to 5 mg bid. BP normal in office today.  Joints good. Left knee doing well post injection this past year.  Notes hearing loss chronically but not interested in hearing aids yet.  Soft stools. Eats fiber cereal a few days per week. Urination stable.  Sleep good on Lunesta 3 mg still. Has tried coming off in past without success. Uses CPAP at night consistently. Denies hangover effect.  Right cheek skin lesion biopsied by Derm recently. Healing well and going for path results today.  Notes eczema on legs intermittently.    Review of Systems   All other systems reviewed and are negative.      Past Medical History:   Diagnosis Date    Allergy     Basal cell carcinoma     Basal cell carcinoma of right cheek 03/29/2023    R mid cheek    Bladder cancer     BPH with obstruction/lower urinary tract symptoms     Cataract     Chronic cough     Coronary artery disease     Hyperlipidemia     Increased prostate specific antigen (PSA) velocity 10/15/2015    Insomnia     Kidney stone     Personal history of colonic polyps     Skin cancer     Sleep apnea     CPAP    Transient global amnesia     x2         Current Outpatient Medications:     ascorbic acid, vitamin C, (VITAMIN C) 250 MG tablet, Take 250 mg by mouth once daily., Disp: , Rfl:      desonide (DESOWEN) 0.05 % cream, Apply 1 application topically Daily., Disp: , Rfl:     eszopiclone (LUNESTA) 3 mg Tab, Take 1 tablet (3 mg total) by mouth nightly as needed (Insomnia)., Disp: 30 tablet, Rfl: 2    fluticasone propionate (FLONASE) 50 mcg/actuation nasal spray, 2 sprays (100 mcg total) by Each Nostril route once daily., Disp: 16 g, Rfl: 5    omega-3 fatty acids 300 mg Cap, Take 300 mg by mouth., Disp: , Rfl:     promethazine-dextromethorphan (PROMETHAZINE-DM) 6.25-15 mg/5 mL Syrp, Take 5 mLs by mouth every 8 (eight) hours as needed (Cough)., Disp: 180 mL, Rfl: 0    pseudoephedrine (SUDAFED) 120 mg 12 hr tablet, Take 120 mg by mouth as needed., Disp: , Rfl:     ramipriL (ALTACE) 5 MG capsule, Take 1 capsule (5 mg total) by mouth 2 (two) times daily., Disp: 180 capsule, Rfl: 3    rosuvastatin (CRESTOR) 20 MG tablet, Take 1 tablet (20 mg total) by mouth once daily., Disp: 90 tablet, Rfl: 3    Past Surgical History:   Procedure Laterality Date    BLADDER SURGERY      Ca excision    CARDIAC CATHETERIZATION      COLONOSCOPY N/A 12/04/2017    No further screening planned.    CYSTOSCOPY      EYE SURGERY Bilateral     Cataract    KIDNEY STONE SURGERY      LITHOTRIPSY      TONSILLECTOMY      at age 4       Family History   Problem Relation Age of Onset    Hypertension Mother     Hypertension Brother     Heart attack Brother     No Known Problems Father     No Known Problems Maternal Grandmother     No Known Problems Maternal Grandfather     No Known Problems Paternal Grandmother     No Known Problems Paternal Grandfather     No Known Problems Sister     No Known Problems Maternal Aunt     No Known Problems Maternal Uncle     No Known Problems Paternal Aunt     No Known Problems Paternal Uncle     Colon polyps Neg Hx     Cancer Neg Hx     Heart disease Neg Hx     Anemia Neg Hx     Arrhythmia Neg Hx     Asthma Neg Hx     Clotting disorder Neg Hx     Fainting Neg Hx     Heart failure Neg Hx     Hyperlipidemia Neg  Hx        Social History     Tobacco Use    Smoking status: Former     Current packs/day: 0.00     Average packs/day: 0.3 packs/day for 20.0 years (5.0 ttl pk-yrs)     Types: Cigarettes     Start date: 1968     Quit date: 1988     Years since quittin.9     Passive exposure: Never    Smokeless tobacco: Never    Tobacco comments:     STOPPED 30 YRS AGO.   Substance Use Topics    Alcohol use: Yes     Comment: 2 to 3 most days.    Drug use: No       Immunization History   Administered Date(s) Administered    COVID-19, MRNA, LN-S, PF (Pfizer) (Gray Cap) 2022    COVID-19, MRNA, LN-S, PF (Pfizer) (Purple Cap) 2021, 2021, 2021    COVID-19, mRNA, LNP-S, bivalent booster, PF (PFIZER OMICRON) 10/06/2022    Influenza (FLUAD) - Trivalent - Adjuvanted - PF (65+) 09/15/2017, 10/06/2018    Influenza - High Dose - PF (65 years and older) 10/06/2014, 2015, 2016, 2017, 09/10/2019, 10/21/2021, 10/06/2022    Influenza - Trivalent (ADULT) 10/10/2007, 2009, 2010, 10/31/2011    Influenza - Trivalent - PF (ADULT) 2020    Influenza Split 10/01/2015    Pneumococcal Conjugate - 13 Valent 10/06/2014, 10/15/2015    Pneumococcal Polysaccharide - 23 Valent 2019    Tdap 10/06/2014    Zoster 10/06/2014    Zoster Recombinant 2020         Objective:      Vitals:    23 0910   BP: 121/76   Pulse: 60       Physical Exam  Constitutional:       General: He is not in acute distress.     Appearance: Normal appearance. He is well-developed. He is not ill-appearing.   HENT:      Head: Normocephalic and atraumatic.      Right Ear: Hearing and tympanic membrane normal. There is impacted cerumen (Partial).      Left Ear: Hearing and tympanic membrane normal. There is no impacted cerumen.      Nose: Nose normal.      Mouth/Throat:      Mouth: Mucous membranes are moist.      Pharynx: Oropharynx is clear.   Eyes:      Extraocular Movements: Extraocular movements intact.       Conjunctiva/sclera: Conjunctivae normal.      Pupils: Pupils are equal, round, and reactive to light.   Neck:      Vascular: No carotid bruit.   Cardiovascular:      Rate and Rhythm: Normal rate and regular rhythm.      Heart sounds: Normal heart sounds. No murmur heard.  Pulmonary:      Effort: Pulmonary effort is normal. No respiratory distress.      Breath sounds: Normal breath sounds. No wheezing, rhonchi or rales.   Abdominal:      General: Abdomen is flat. There is no distension.      Palpations: Abdomen is soft. There is no mass.      Tenderness: There is no abdominal tenderness.      Hernia: No hernia is present.   Musculoskeletal:         General: No swelling or deformity. Normal range of motion.      Cervical back: No tenderness.      Right lower leg: Edema (1+ b/l) present.      Left lower leg: Edema present.   Lymphadenopathy:      Cervical: No cervical adenopathy.   Skin:     General: Skin is warm and dry.      Comments: Xerosis of legs. Right cheek lesion of recent biopsy healing well.   Neurological:      General: No focal deficit present.      Mental Status: He is alert and oriented to person, place, and time.      Cranial Nerves: No cranial nerve deficit.      Coordination: Coordination normal.      Gait: Gait normal.      Deep Tendon Reflexes: Reflexes normal.   Psychiatric:         Mood and Affect: Mood normal.         Behavior: Behavior normal.         Thought Content: Thought content normal.         Judgment: Judgment normal.         Recent Results (from the past 2016 hour(s))   Lipid Panel    Collection Time: 12/04/23  7:42 AM   Result Value Ref Range    Cholesterol 159 120 - 199 mg/dL    Triglycerides 82 30 - 150 mg/dL    HDL 65 40 - 75 mg/dL    LDL Cholesterol 77.6 63.0 - 159.0 mg/dL    HDL/Cholesterol Ratio 40.9 20.0 - 50.0 %    Total Cholesterol/HDL Ratio 2.4 2.0 - 5.0    Non-HDL Cholesterol 94 mg/dL   PROSTATE SPECIFIC ANTIGEN, DIAGNOSTIC    Collection Time: 12/04/23  7:42 AM   Result  Value Ref Range    PSA Diagnostic 1.8 0.00 - 4.00 ng/mL   Hemoglobin A1C    Collection Time: 12/04/23  7:42 AM   Result Value Ref Range    Hemoglobin A1C 6.1 (H) 4.0 - 5.6 %    Estimated Avg Glucose 128 68 - 131 mg/dL   COMPREHENSIVE METABOLIC PANEL    Collection Time: 12/04/23  7:42 AM   Result Value Ref Range    Sodium 146 (H) 136 - 145 mmol/L    Potassium 4.6 3.5 - 5.1 mmol/L    Chloride 110 95 - 110 mmol/L    CO2 25 23 - 29 mmol/L    Glucose 118 (H) 70 - 110 mg/dL    BUN 17 8 - 23 mg/dL    Creatinine 1.0 0.5 - 1.4 mg/dL    Calcium 9.9 8.7 - 10.5 mg/dL    Total Protein 7.2 6.0 - 8.4 g/dL    Albumin 3.9 3.5 - 5.2 g/dL    Total Bilirubin 0.6 0.1 - 1.0 mg/dL    Alkaline Phosphatase 36 (L) 55 - 135 U/L    AST 16 10 - 40 U/L    ALT 14 10 - 44 U/L    eGFR >60.0 >60 mL/min/1.73 m^2    Anion Gap 11 8 - 16 mmol/L   CBC W/ AUTO DIFFERENTIAL    Collection Time: 12/04/23  7:42 AM   Result Value Ref Range    WBC 6.71 3.90 - 12.70 K/uL    RBC 4.47 (L) 4.60 - 6.20 M/uL    Hemoglobin 14.4 14.0 - 18.0 g/dL    Hematocrit 43.1 40.0 - 54.0 %    MCV 96 82 - 98 fL    MCH 32.2 (H) 27.0 - 31.0 pg    MCHC 33.4 32.0 - 36.0 g/dL    RDW 13.8 11.5 - 14.5 %    Platelets 204 150 - 450 K/uL    MPV 10.8 9.2 - 12.9 fL    Immature Granulocytes 0.3 0.0 - 0.5 %    Gran # (ANC) 3.9 1.8 - 7.7 K/uL    Immature Grans (Abs) 0.02 0.00 - 0.04 K/uL    Lymph # 1.6 1.0 - 4.8 K/uL    Mono # 0.8 0.3 - 1.0 K/uL    Eos # 0.4 0.0 - 0.5 K/uL    Baso # 0.07 0.00 - 0.20 K/uL    nRBC 0 0 /100 WBC    Gran % 57.9 38.0 - 73.0 %    Lymph % 23.1 18.0 - 48.0 %    Mono % 11.9 4.0 - 15.0 %    Eosinophil % 5.8 0.0 - 8.0 %    Basophil % 1.0 0.0 - 1.9 %    Differential Method Automated    TSH    Collection Time: 12/04/23  7:42 AM   Result Value Ref Range    TSH 2.395 0.400 - 4.000 uIU/mL   IMMUNOGLOBULIN FREE LT CHAINS BLOOD    Collection Time: 12/04/23  7:42 AM   Result Value Ref Range    Kappa Free Light Chains 3.23 (H) 0.33 - 1.94 mg/dL    Lambda Free Light Chains 2.16 0.57 -  2.63 mg/dL    Kappa/Lambda FLC Ratio 1.50 0.26 - 1.65   IMMUNOFIXATION ELECTROPHORESIS, SERUM    Collection Time: 12/04/23  7:42 AM   Result Value Ref Range    Immunofix Interp. SEE COMMENT    PROTEIN ELECTROPHORESIS, SERUM    Collection Time: 12/04/23  7:42 AM   Result Value Ref Range    Protein, Serum 6.8 6.0 - 8.4 g/dL    Albumin 4.18 3.35 - 5.55 g/dL    Alpha-1 0.28 0.17 - 0.41 g/dL    Alpha-2 0.67 0.43 - 0.99 g/dL    Beta 0.75 0.50 - 1.10 g/dL    Gamma 0.92 0.67 - 1.58 g/dL   Pathologist Interpretation FLOR    Collection Time: 12/04/23  7:42 AM   Result Value Ref Range    Pathologist Interpretation FLOR REVIEWED    Pathologist Interpretation SPE    Collection Time: 12/04/23  7:42 AM   Result Value Ref Range    Pathologist Interpretation SPE REVIEWED           Assessment/Plan:     1) Annual wellness exam  2) CAD, HLD - Near goal on Crestor 20. Follows with Cards. Focusing on saturated fat limitation. Repeat lipids 6 to 12 mths.  3) HTN - Controlled on recently increased Ramipril to 5 mg bid. Focusing on weight, exercise, diet, alcohol moderation from here.  4) Insomnia - Controlled on Lunesta 3 mg nightly. Will continue to monitor for side effects.  5) Rhinitis - Controlled with Flonase prn.  6) Skin cancer hx - Follows with Derm (Dr. Sandro Collins) twice yearly. Has f/u on path from recent right cheek bx later today.  7) Xerosis, Eczema hx - Uses steroid cream prn. Discussed starting daily moisturizer for prevention.  8) DELVIN - Using nightly cpap.  9) Prediabetes - Uncontrolled. Working on weight loss, diet, exercise. He would like to consider Mounjaro. Agreed on giving 3 mths of diet and exercise focus to start. He will reach out at that point if still interested. Repeat A1c 6 to 12 mths.    - Has had recent covid booster and shingrix previously. Planning to get RSV soon.  - Colonoscopy 2017 with no plans for further screening.  - Bladder ca hx. Stopped surveillance cystoscopies after 7 yrs previously.

## 2023-12-11 NOTE — TELEPHONE ENCOUNTER
EP is a referral from Dr. Collins with [facility] on R angle of mandible. Scheduled for mohs on 1/25/24 at 1000 am. Pt has been put on the wait list if anything sooner becomes available.

## 2024-01-05 ENCOUNTER — TELEPHONE (OUTPATIENT)
Dept: INTERNAL MEDICINE | Facility: CLINIC | Age: 84
End: 2024-01-05
Payer: MEDICARE

## 2024-01-05 NOTE — TELEPHONE ENCOUNTER
----- Message from Sveta Carranza LPN sent at 1/5/2024  3:15 PM CST -----  Regarding: phone call  Patient asked if you can give him a call. Would like to discuss his B/P and headaches with you.    [phone]

## 2024-01-09 NOTE — TELEPHONE ENCOUNTER
Some excursions to 140's but average bp seems to be 130's. Pt notes a tension type HA that started day prior which prompted him to call. Overall mild. Agreed on continued monitoring for now.

## 2024-01-13 DIAGNOSIS — E78.5 HYPERLIPIDEMIA, UNSPECIFIED HYPERLIPIDEMIA TYPE: ICD-10-CM

## 2024-01-13 RX ORDER — ROSUVASTATIN CALCIUM 20 MG/1
20 TABLET, COATED ORAL
Qty: 90 TABLET | Refills: 3 | Status: SHIPPED | OUTPATIENT
Start: 2024-01-13

## 2024-01-13 NOTE — TELEPHONE ENCOUNTER
No care due was identified.  [facility] Embedded Care Due Messages. Reference number: [id number].   1/13/2024 9:56:08 AM CST

## 2024-01-13 NOTE — TELEPHONE ENCOUNTER
Refill Routing Note   Medication(s) are not appropriate for processing by [facility] for the following reason(s):        No active prescription written by provider    [facility] action(s):  Defer               Appointments  past 12m or future 3m with PCP    Date Provider   Last Visit   12/11/2023 Huseyin Rice MD   Next Visit   Visit date not found Huseyin Rice MD   ED visits in past 90 days: 0        Note composed:11:40 AM 01/13/2024

## 2024-01-25 ENCOUNTER — PROCEDURE VISIT (OUTPATIENT)
Dept: DERMATOLOGY | Facility: CLINIC | Age: 84
End: 2024-01-25
Payer: MEDICARE

## 2024-01-25 VITALS
BODY MASS INDEX: 30.8 KG/M2 | DIASTOLIC BLOOD PRESSURE: 82 MMHG | WEIGHT: 220 LBS | SYSTOLIC BLOOD PRESSURE: 127 MMHG | HEART RATE: 56 BPM | HEIGHT: 71 IN

## 2024-01-25 DIAGNOSIS — C44.329 SQUAMOUS CELL CARCINOMA OF SKIN OF RIGHT CHEEK: Primary | ICD-10-CM

## 2024-01-25 PROCEDURE — 17311 MOHS 1 STAGE H/N/HF/G: CPT | Mod: PBBFAC | Performed by: DERMATOLOGY

## 2024-01-25 PROCEDURE — 17311 MOHS 1 STAGE H/N/HF/G: CPT | Mod: S$PBB,,, | Performed by: DERMATOLOGY

## 2024-01-25 PROCEDURE — 13131 CMPLX RPR F/C/C/M/N/AX/G/H/F: CPT | Mod: S$PBB,51,, | Performed by: DERMATOLOGY

## 2024-01-25 PROCEDURE — 13131 CMPLX RPR F/C/C/M/N/AX/G/H/F: CPT | Mod: PBBFAC,51 | Performed by: DERMATOLOGY

## 2024-01-25 PROCEDURE — 99499 UNLISTED E&M SERVICE: CPT | Mod: S$PBB,,, | Performed by: DERMATOLOGY

## 2024-01-25 NOTE — PROGRESS NOTES
PROCEDURE: Mohs' Micrographic Surgery    INDICATION: Location in non-mask areas of face where maximum conservation of tumor-free tissue is needed. Biopsy-proven skin cancer of cosmetically and functionally important areas, including head, neck, genital, hand, foot, or areas known for having difficulty in healing, such as the lower anterior legs. Tumor with ill-defined borders. Tumor with aggressive histopathology. Aggressive histopathology including sclerosing, morpheaform/infiltrating, micronodular, superficial multicentric, poorly differentiated, basosquamous, or perineural invasion.    REFERRING PROVIDER: Sandro Collins M.D.    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    STAGES OF MOHS' SURGERY PERFORMED: 1    TUMOR-FREE PLANE ACHIEVED: Yes    HEMOSTASIS: electrocoagulation     SPECIMENS: 2     LOCATION: right angle of mandible. Location verified with Dr. Collins's clinical photograph. Patient also verified location with hand held mirror.    INITIAL LESION SIZE: 0.4 x 0.5 cm    FINAL DEFECT SIZE: 1.0 x 1.1 cm    WOUND REPAIR/DISPOSITION: The patient tolerated Mohs' Micrographic Surgery for a squamous cell carcinoma very well. When the tumor was completely removed, a repair of the surgical defect was undertaken.    PROCEDURE: Complex Linear Repair    INDICATION: Status post Mohs' Micrographic Surgery for squamous cell carcinoma.    CASE NUMBER:     SURGEON: Juan Plasencia MD    ASSISTANTS: Lidia Mtz PA-C    ANESTHETIC: 3 cc 1%  "Lidocaine with Epinephrine 1:100,000    SURGICAL PREP: Hibiclens, prepped by Lidia Mtz PA-C    LOCATION: right angle of mandible    DEFECT SIZE: 1.0 x 1.1 cm    WOUND REPAIR/DISPOSITION:  After the patient's carcinoma had been completely removed with Mohs' Micrographic Surgery, a repair of the surgical defect was undertaken. The patient was returned to the operating suite where the area of right angle of mandible was prepped, draped, and anesthetized in the usual sterile fashion. The wound was widely undermined in all directions. The wound was undermined to a distance at least the maximum width of the defect as measured perpendicular to the closure line along at least one entire edge of the defect, in this case 1.5 cm. Then, electrocoagulation was used to obtain meticulous hemostasis. 5-0 Vicryl buried vertical mattress sutures were placed into the subcutaneous and dermal plane to close the wound and kaela the cutaneous wound edge. Bilateral dog ears were identified and were removed by a standard Burow's triangle technique. The cutaneous wound edges were closed using running 5-0 fast absorbing gut suture.    The patient tolerated the procedure well.    The area was cleaned and dressed appropriately and the patient was given wound care instructions. Advised patient to contact our office with any questions or concerns regarding this area. Patient verbally stated understanding.    LENGTH OF REPAIR: 1.8 cm    Vitals:    01/25/24 0957 01/25/24 1215   BP: 120/78 127/82   BP Location: Left arm    Patient Position: Sitting    BP Method: Medium (Automatic)    Pulse: (!) 55 (!) 56   Weight: 99.8 kg (220 lb 0.3 oz)    Height: 5' 11" (1.803 m)          "

## 2024-02-08 ENCOUNTER — TELEPHONE (OUTPATIENT)
Dept: DERMATOLOGY | Facility: CLINIC | Age: 84
End: 2024-02-08
Payer: MEDICARE

## 2024-02-08 NOTE — TELEPHONE ENCOUNTER
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

## 2024-02-15 ENCOUNTER — TELEPHONE (OUTPATIENT)
Dept: DERMATOLOGY | Facility: CLINIC | Age: 84
End: 2024-02-15
Payer: MEDICARE

## 2024-02-15 NOTE — TELEPHONE ENCOUNTER
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 Collins.

## 2024-02-23 ENCOUNTER — TELEPHONE (OUTPATIENT)
Dept: DERMATOLOGY | Facility: CLINIC | Age: 84
End: 2024-02-23
Payer: MEDICARE

## 2024-02-23 NOTE — TELEPHONE ENCOUNTER
EP is a referral from Dr. Collins with [facility] on L medial extensor forearm. Scheduled for mohs surgery on 4/2 at 1 pm. Pt confirmed date and time.

## 2024-02-27 DIAGNOSIS — Z00.00 ENCOUNTER FOR MEDICARE ANNUAL WELLNESS EXAM: ICD-10-CM

## 2024-03-10 ENCOUNTER — TELEPHONE (OUTPATIENT)
Dept: INTERNAL MEDICINE | Facility: CLINIC | Age: 84
End: 2024-03-10
Payer: MEDICARE

## 2024-03-10 DIAGNOSIS — G47.00 INSOMNIA, UNSPECIFIED TYPE: ICD-10-CM

## 2024-03-10 RX ORDER — ESZOPICLONE 3 MG/1
3 TABLET, FILM COATED ORAL NIGHTLY PRN
Qty: 30 TABLET | Refills: 2 | Status: SHIPPED | OUTPATIENT
Start: 2024-03-10 | End: 2024-05-20

## 2024-03-12 ENCOUNTER — TELEPHONE (OUTPATIENT)
Dept: UROLOGY | Facility: CLINIC | Age: 84
End: 2024-03-12
Payer: MEDICARE

## 2024-03-12 ENCOUNTER — TELEPHONE (OUTPATIENT)
Dept: INTERNAL MEDICINE | Facility: CLINIC | Age: 84
End: 2024-03-12
Payer: MEDICARE

## 2024-03-12 DIAGNOSIS — R31.0 GROSS HEMATURIA: Primary | ICD-10-CM

## 2024-03-12 NOTE — TELEPHONE ENCOUNTER
----- Message from Rosa Davis RN sent at 3/12/2024 10:21 AM CDT -----  Regarding: FW: Dr. Valentin called cristel like to speak with Dr. Kyle  Contact: [phone]    ----- Message -----  From: Nu Dinh  Sent: 3/12/2024  10:18 AM CDT  To: Anabella JIMENEZ Staff  Subject: Dr. Valentin called cristel like to speak with Johnny    Name of Who is Calling:AVINASH VALENTIN [[id number]]        What is the request in detail:Pt called states he cristel like to speak with Dr. Kyle personally regarding some issues he's having. Please advise         Can the clinic reply by [facility]:No        [facility] Telephone Information:  Mobile          [phone]

## 2024-03-12 NOTE — TELEPHONE ENCOUNTER
Left wrist cyst developed recently. Reviewed images. Non-painful. Skin intact. Suspect ganglion cyst. He will monitor for now and consider Hand Ortho if growing or becomes painful.

## 2024-03-15 ENCOUNTER — HOSPITAL ENCOUNTER (OUTPATIENT)
Dept: RADIOLOGY | Facility: HOSPITAL | Age: 84
Discharge: HOME | End: 2024-03-15
Attending: UROLOGY
Payer: MEDICARE

## 2024-03-15 DIAGNOSIS — R31.0 GROSS HEMATURIA: ICD-10-CM

## 2024-03-15 PROCEDURE — 25500020 PHARM REV CODE 255: Performed by: UROLOGY

## 2024-03-15 PROCEDURE — 74178 CT ABD&PLV WO CNTR FLWD CNTR: CPT | Mod: 26,,, | Performed by: RADIOLOGY

## 2024-03-15 PROCEDURE — 74178 CT ABD&PLV WO CNTR FLWD CNTR: CPT | Mod: TC

## 2024-03-15 RX ADMIN — IOHEXOL 125 ML: 350 INJECTION, SOLUTION INTRAVENOUS at 06:03

## 2024-03-19 ENCOUNTER — OFFICE VISIT (OUTPATIENT)
Dept: INTERNAL MEDICINE | Facility: CLINIC | Age: 84
End: 2024-03-19
Payer: MEDICARE

## 2024-03-19 ENCOUNTER — PROCEDURE VISIT (OUTPATIENT)
Dept: UROLOGY | Facility: CLINIC | Age: 84
End: 2024-03-19
Payer: MEDICARE

## 2024-03-19 VITALS
HEART RATE: 55 BPM | DIASTOLIC BLOOD PRESSURE: 81 MMHG | HEIGHT: 71 IN | TEMPERATURE: 98 F | WEIGHT: 212.19 LBS | BODY MASS INDEX: 29.71 KG/M2 | SYSTOLIC BLOOD PRESSURE: 131 MMHG | RESPIRATION RATE: 18 BRPM

## 2024-03-19 DIAGNOSIS — M67.40 GANGLION CYST: Primary | ICD-10-CM

## 2024-03-19 DIAGNOSIS — C67.0 MALIGNANT NEOPLASM OF TRIGONE OF URINARY BLADDER: Primary | ICD-10-CM

## 2024-03-19 DIAGNOSIS — C67.0 MALIGNANT NEOPLASM OF TRIGONE OF URINARY BLADDER: ICD-10-CM

## 2024-03-19 DIAGNOSIS — R31.0 GROSS HEMATURIA: Primary | ICD-10-CM

## 2024-03-19 PROCEDURE — 52000 CYSTOURETHROSCOPY: CPT | Mod: PBBFAC | Performed by: UROLOGY

## 2024-03-19 PROCEDURE — 52000 CYSTOURETHROSCOPY: CPT | Mod: PBBFAC

## 2024-03-19 PROCEDURE — 52000 CYSTOURETHROSCOPY: CPT | Mod: S$PBB,,, | Performed by: UROLOGY

## 2024-03-19 PROCEDURE — 99499 UNLISTED E&M SERVICE: CPT | Mod: S$PBB,,, | Performed by: INTERNAL MEDICINE

## 2024-03-19 RX ORDER — LIDOCAINE HYDROCHLORIDE 20 MG/ML
JELLY TOPICAL ONCE
Status: COMPLETED | OUTPATIENT
Start: 2024-03-19 | End: 2024-03-19

## 2024-03-19 RX ADMIN — LIDOCAINE HYDROCHLORIDE 10 ML: 20 JELLY TOPICAL at 08:03

## 2024-03-19 NOTE — PROGRESS NOTES
Patient stopped by office briefly this morning before a planned cystoscopy. Recently has non-painful nodule on left medial volar wrist. No skin changes or redness. No wrist issues otherwise. Palpated the nodule which if firm but soft and consistent with likely ganglion cyst. Given moderate size and not painful or limiting, agreed on monitoring for now. Possible it could reduce in size without intervention. If enlarging or becomes painful, would then consider Hand Ortho referral.

## 2024-03-19 NOTE — PATIENT INSTRUCTIONS
What to Expect After a Cystoscopy  For the next 24-48 hours, you may feel a mild burning when you urinate. This burning is normal and expected. Drink plenty of water to dilute the urine to help relieve the burning sensation. You may also see a small amount of blood in your urine after the procedure.    Unless you are already taking antibiotics, you may be given an antibiotic after the test to prevent infection.    Signs and Symptoms to Report  Call the [facility] at [phone] if you develop any of the following:  Fever of 101 degrees or higher  Chills or persistent bleeding  Inability to urinate

## 2024-03-19 NOTE — PROCEDURES
Cystoscopy    Date/Time: 3/19/2024 8:45 AM    Performed by: Fernando Mccormick MD  Authorized by: Jose Kyle MD    Consent Done?:  Yes (Written)  Prep: patient was prepped and draped in usual sterile fashion    Anesthesia:  Intraurethral instillation  Local anesthetic:  Lidocaine 2% topical gel  Indications: history bladder cancer and hematuria    Anesthesia:  Intraurethral instillation  Preparation: Patient was prepped and draped in usual sterile fashion      Procedure in detail:   The risks, benefits, complications, treatment options, and expected outcomes were discussed with the patient. The patient concurred with the proposed plan, giving informed consent.     2% lidocaine urojet was used for local analgesia.  The patient was placed supine. The genitalia was prepped and draped in the standard sterile fashion.  The flexible cystoscope was assembled and then advanced into the urethra and into the bladder.  The patient's urethra was normal.   The bladder was completely surveyed in a systematic fashion. The dome, anterior, posterior and lateral walls were examined systematically.  The ureteral orifices were found in their usual position and configuration with clear efflux.  On retroflexion, an approximately 1 cm papillary tumor was identified near the bladder neck on the right. No additional lesions or tumors identified.   No strictures were noted.  The prostate showed severe hypertrophy with a large intravesical component.  There was a median lobe present.  The cystoscope was then removed. The patient tolerated the procedure well with no immediate complications.    FINDINGS:   1 cm papillary tumor was identified near the bladder neck on the right  Severe hypertrophy with a large intravesical component    Specimens: none  I was present for the entire procedure.   VANE Kyle MD  Plan:  Schedule for TURBT    Fernando Mccormick MD  Urology PGY-2  [facility]  I was present for the entire procedure.

## 2024-03-20 ENCOUNTER — TELEPHONE (OUTPATIENT)
Dept: PREADMISSION TESTING | Facility: HOSPITAL | Age: 84
End: 2024-03-20
Payer: MEDICARE

## 2024-03-20 NOTE — ANESTHESIA PAT ROS NOTE
03/20/2024  Gurjit Valentin is a 83 y.o., male.      Pre-op Assessment          Review of Systems           Anesthesia Assessment: Preoperative EQUATION    Planned Procedure: Procedure(s) (LRB):  TURBT (TRANSURETHRAL RESECTION OF BLADDER TUMOR) (N/A)  CYSTOSCOPY (N/A)  PYELOGRAM, RETROGRADE (N/A)  PLACEMENT-STENT- Ureteral (N/A)  Requested Anesthesia Type:General  Surgeon: Jose Kyle MD  Service: Urology  Known or anticipated Date of Surgery:4/8/2024    Surgeon notes: reviewed    Electronic QUestionnaire Assessment completed via nurse interview with patient.        Triage considerations:         Previous anesthesia records:GETA and No problems  12/04/2017 colonoscopy, gen anes, ASA 3    Last PCP note: 3-6 months ago , within [facility]   Subspecialty notes: Urology    Other important co-morbidities: HLD, HTN, Obesity, DELVIN, and bladder cancer, h/o multiple BCC, BPH w/ LUTS, elevated PSA, cataract, chronic cough, CAD w/o angina, allergic rhinitis, dermatitis, insomnia, recurrent kidney stones, h/o colon polyps       Tests already available:  No recent tests.             Instructions     Optimization:  Anesthesia Preop Clinic Assessment Indicated    Medical Opinion Indicated       Sub-specialist consult indicated: TBD       Plan:    Testing:  BMP, EKG, and Hematology Profile   Pre-anesthesia  visit       Visit focus: to be determined     Consultation:IM Perioperative Hospitalist       Navigation: Tests Scheduled.              Consults scheduled.             Results will be tracked by Preop Clinic.

## 2024-03-20 NOTE — PRE-PROCEDURE INSTRUCTIONS
Sent to patient via [employer] message--  (Pt also to come in to preop)    PreOp and Medication Instructions given:   - medication instructions (instructed to hold vitamins, herbal supplements and NSAIDS one week prior to surgery)  - NPO guidelines, unless otherwise instructed by Surgeon's Office  - Arrival place and time to be given by the Surgeon's Office   - Shower with antibacterial soap   - No makeup or moisturizer to face   - No perfume/cologne, powder, lotions, deodorant or aftershave   - Leave all jewelry, including body piercings, and valuables at home.  - Arrange for someone to drive you home following surgery; will not be allowed to leave the surgical facility alone or drive self home following sedation and anesthesia.    Pt instructed to call surgeon's office or POC with any questions or changes.

## 2024-03-25 ENCOUNTER — TELEPHONE (OUTPATIENT)
Dept: DERMATOLOGY | Facility: CLINIC | Age: 84
End: 2024-03-25
Payer: MEDICARE

## 2024-03-25 NOTE — TELEPHONE ENCOUNTER
----- Message from Emmie Acosta sent at 3/25/2024  1:13 PM CDT -----  Regarding: Cancellation  Contact: [phone]  Calling to speak with nurse to reschedule surgery/procedure as soon as possible. Please call to reschedule as soon as possible with patient. Thanks!

## 2024-03-26 ENCOUNTER — TELEPHONE (OUTPATIENT)
Dept: INTERNAL MEDICINE | Facility: CLINIC | Age: 84
End: 2024-03-26
Payer: MEDICARE

## 2024-03-26 PROBLEM — N40.0 BPH (BENIGN PROSTATIC HYPERPLASIA): Status: ACTIVE | Noted: 2024-03-26

## 2024-03-26 NOTE — OUTPATIENT SUBJECTIVE & OBJECTIVE
Outpatient Subjective & Objective      Chief Complaint: Preoperative evaulation, perioperative medical management, and complication reduction plan.     Functional Capacity: no regular exercise regimen, parked in garage and walked to POC without chest pain or SOB.       Anesthesia issues: None    Difficulty mouth opening: No    Steroid use in the last 12 months:  left knee    Dental Issues: None    Family anesthesia difficulty: None     Family Hx of Thrombosis: None    Past Medical History:   Diagnosis Date    Allergy     Basal cell carcinoma     Basal cell carcinoma of right cheek 03/29/2023    R mid cheek    Bladder cancer     BPH with obstruction/lower urinary tract symptoms     Cataract     Chronic cough     Coronary artery disease     Ganglion cyst of wrist, left     Hyperlipidemia     Increased prostate specific antigen (PSA) velocity 10/15/2015    Insomnia     Kidney stone     Personal history of colonic polyps     Prediabetes     Skin cancer     Sleep apnea     CPAP    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    right angle of mandible    Transient global amnesia     x2         Past Medical History Pertinent Negatives:   Diagnosis Date Noted    AAA (abdominal aortic aneurysm) 07/18/2014    Acute coronary syndrome 07/18/2014    Anemia 01/31/2013    Asthma 01/31/2013    Asthma 07/18/2014    Atrial fibrillation 07/18/2014    Atrial flutter 07/18/2014    Cardiomyopathy 07/18/2014    Carotid artery occlusion 07/18/2014    CHF (congestive heart failure) 07/18/2014    Clotting disorder 01/31/2013    COPD (chronic obstructive pulmonary disease) 01/31/2013    Diabetes mellitus 01/31/2013    Diabetes mellitus 07/18/2014    Glaucoma 01/31/2013    Heart block 07/18/2014    Heart murmur 07/18/2014    HIV infection 01/31/2013    Liver disease 01/31/2013    Peripheral vascular disease 01/31/2013    STD (sexually transmitted disease) 01/31/2013    Stenosis and insufficiency of lacrimal passages 07/18/2014    Stroke  "01/31/2013    Supraventricular tachycardia 07/18/2014    Syncope and collapse 07/18/2014    Thyroid disease 07/18/2014    Urinary tract infection 01/31/2013    Valvular regurgitation 07/18/2014    Ventricular tachycardia 07/18/2014         Past Surgical History:   Procedure Laterality Date    BLADDER SURGERY      Ca excision    CARDIAC CATHETERIZATION      COLONOSCOPY N/A 12/04/2017    No further screening planned.    CYSTOSCOPY      EYE SURGERY Bilateral     Cataract    KIDNEY STONE SURGERY      LITHOTRIPSY      TONSILLECTOMY      at age 4       Review of Systems   Constitutional:  Negative for chills, fatigue, fever and unexpected weight change.   HENT:  Negative for congestion, hearing loss, rhinorrhea, sore throat, tinnitus and trouble swallowing.    Eyes:  Negative for visual disturbance.   Respiratory:  Negative for cough, chest tightness, shortness of breath and wheezing.    Cardiovascular:  Negative for chest pain, palpitations and leg swelling.   Gastrointestinal:  Negative for constipation and diarrhea.        Denies Fatty liver, Hepatitis   Genitourinary:  Negative for decreased urine volume, difficulty urinating, dysuria, frequency, hematuria and urgency.   Musculoskeletal:  Positive for back pain (occasional mid-lower). Negative for arthralgias, neck pain and neck stiffness.   Skin:  Positive for rash (outer bilateral thigh- chronic). Negative for wound.   Neurological:  Negative for dizziness, syncope, weakness, numbness and headaches.   Hematological:  Does not bruise/bleed easily.   Psychiatric/Behavioral:  Negative for sleep disturbance and suicidal ideas.               VITALS  Visit Vitals  /70 (BP Location: Left arm, Patient Position: Sitting)   Pulse (!) 51   Temp 97.6 °F (36.4 °C) (Oral)   Ht 5' 11" (1.803 m)   Wt 96 kg (211 lb 10.3 oz)   SpO2 98%   BMI 29.52 kg/m²          Physical Exam  Vitals reviewed.   Constitutional:       General: He is not in acute distress.     Appearance: He is " well-developed.   HENT:      Head: Normocephalic.      Nose: Nose normal.      Mouth/Throat:      Pharynx: No oropharyngeal exudate.   Eyes:      General:         Right eye: No discharge.         Left eye: No discharge.      Conjunctiva/sclera: Conjunctivae normal.      Pupils: Pupils are equal, round, and reactive to light.   Neck:      Thyroid: No thyromegaly.      Vascular: No carotid bruit or JVD.      Trachea: No tracheal deviation.   Cardiovascular:      Rate and Rhythm: Regular rhythm. Bradycardia present.      Pulses:           Carotid pulses are 2+ on the right side and 2+ on the left side.       Dorsalis pedis pulses are 2+ on the right side and 2+ on the left side.        Posterior tibial pulses are 2+ on the right side and 2+ on the left side.      Heart sounds: Normal heart sounds. No murmur heard.  Pulmonary:      Effort: Pulmonary effort is normal. No respiratory distress.      Breath sounds: Normal breath sounds. No stridor. No wheezing, rhonchi or rales.   Abdominal:      General: Bowel sounds are normal. There is no distension.      Palpations: Abdomen is soft.      Tenderness: There is no abdominal tenderness. There is no guarding.   Musculoskeletal:      Cervical back: Normal range of motion. No pain with movement.      Right lower leg: Edema (trace) present.      Left lower leg: Edema (trace) present.   Lymphadenopathy:      Cervical: No cervical adenopathy.   Skin:     General: Skin is warm and dry.      Capillary Refill: Capillary refill takes less than 2 seconds.      Findings: No erythema or rash.   Neurological:      Mental Status: He is alert and oriented to person, place, and time.   Psychiatric:         Behavior: Behavior normal.          Significant Labs:  Lab Results   Component Value Date    WBC 8.51 03/27/2024    HGB 15.9 03/27/2024    HCT 48.5 03/27/2024     03/27/2024    CHOL 159 12/04/2023    TRIG 82 12/04/2023    HDL 65 12/04/2023    ALT 14 12/04/2023    AST 16 12/04/2023      03/27/2024    K 4.9 03/27/2024     03/27/2024    CREATININE 0.9 03/27/2024    BUN 16 03/27/2024    CO2 25 03/27/2024    TSH 2.395 12/04/2023    PSA 1.8 11/09/2022    INR 1.0 06/01/2010    HGBA1C 6.1 (H) 12/04/2023           EKG:   Results for orders placed or performed during the hospital encounter of 03/28/24   EKG 12-lead    Collection Time: 03/28/24  8:51 AM   Result Value Ref Range    QRS Duration 88 ms    [facility]HS QTC Calculation 364 ms    Narrative    Test Reason : Z01.818,I10,    Vent. Rate : 053 BPM     Atrial Rate : 053 BPM     P-R Int : 190 ms          QRS Dur : 088 ms      QT Int : 388 ms       P-R-T Axes : 072 051 058 degrees     QTc Int : 364 ms    Sinus bradycardia  Nonspecific T wave abnormality  When compared with ECG of 12-JUN-2023 15:28,    Borderline criteria for Inferior infarct are now Present  Nonspecific T wave abnormality, worse in Lateral leads  Confirmed by SUNIL PALOMO MD ([id number]) on 3/28/2024 10:33:06 AM    Referred By: RHONDA LEOPOLD           Confirmed By:SUNIL PALOMO MD       2D ECHO:  TTE:  Results for orders placed or performed during the hospital encounter of 10/29/21   Echo Color Flow Doppler? Yes   Result Value Ref Range    Ascending aorta 2.99 cm    STJ 2.94 cm    AV mean gradient 3 mmHg    Ao peak miguel angel 1.20 m/s    Ao VTI 25.98 cm    IVS 0.92 0.6 - 1.1 cm    LA size 4.21 cm    Left Atrium Major Axis 5.32 cm    Left Atrium Minor Axis 5.36 cm    LVIDd 5.20 3.5 - 6.0 cm    LVIDs 3.39 2.1 - 4.0 cm    LVOT peak VTI 21.98 cm    Posterior Wall 0.81 0.6 - 1.1 cm    MV Peak A Miguel Angel 0.76 m/s    E wave deceleration time 238.41 msec    MV Peak E Miguel Angel 0.65 m/s    PV Peak D Miguel Angel 0.31 m/s    PV Peak S Miguel Angel 0.42 m/s    RA Major Axis 5.23 cm    RA Width 3.74 cm    RVDD 3.75 cm    Sinus 3.33 cm    TAPSE 1.96 cm    TR Max Miguel Angel 2.49 m/s    TDI LATERAL 0.07 m/s    TDI SEPTAL 0.07 m/s    LA WIDTH 4.20 cm    MV stenosis pressure 1/2 time 69.14 ms    LV Diastolic Volume 129.46 mL    LV Systolic  "Volume 47.08 mL    RV S' 10.41 cm/s    LVOT peak miguel angel 0.94 m/s    LA volume (mod) 53.05 cm3    MV "A" wave duration 8.56 msec    LV LATERAL E/E' RATIO 9.29 m/s    LV SEPTAL E/E' RATIO 9.29 m/s    FS 35 %    LA volume 80.26 cm3    LV mass 160.51 g    Left Ventricle Relative Wall Thickness 0.31 cm    AV Velocity Ratio 0.78     AV index (prosthetic) 0.85     MV valve area p 1/2 method 3.18 cm2    E/A ratio 0.86     Mean e' 0.07 m/s    Pulm vein S/D ratio 1.35     AV peak gradient 6 mmHg    E/E' ratio 9.29 m/s    Triscuspid Valve Regurgitation Peak Gradient 25 mmHg    BSA 2.24 m2    LV Systolic Volume Index 21.4 mL/m2    LV Diastolic Volume Index 58.85 mL/m2    LA Volume Index 36.5 mL/m2    LV Mass Index 73 g/m2    LA Volume Index (Mod) 24.1 mL/m2    Right Atrial Pressure (from IVC) 3 mmHg    EF 60 %    TV resting pulmonary artery pressure 28 mmHg    Narrative    · The left ventricle is normal in size with normal systolic function. The   estimated ejection fraction is 60%.  · Normal right ventricular size with normal right ventricular systolic   function.  · Indeterminate left ventricular diastolic function.  · Mild left atrial enlargement.  · The estimated PA systolic pressure is 28 mmHg.  · Normal central venous pressure (3 mmHg).        Treadmill Stress 5/26/23  The left ventricle is normal in size with normal systolic function. The estimated ejection fraction is 60%.  Normal right ventricular size with normal right ventricular systolic function.  Indeterminate left ventricular diastolic function.  Mild left atrial enlargement.  The estimated PA systolic pressure is 29 mmHg.  Normal central venous pressure (3 mmHg).  The patient's exercise capacity was normal.  The ECG portion of this study is abnormal but not diagnostic for ischemia.  The stress echo portion of this study is negative for myocardial ischemia.  Overall, this study is negative for myocardial ischemia.            Active Cardiac Conditions: " None      Revised Cardiac Risk Index   High -Risk Surgery  Intraperitoneal; Intrathoracic; suprainguinal vascular Yes- + 1 No- 0   History of Ischemic Heart Disease   (Hx of MI/positive exercise test/current chest pain due to ischemia/use of nitrate therapy/EKG with pathological Q waves) Yes- + 1 No- 0   History of CHF  (Pulmonary edema/bilateral rales or S3 gallop/PND/CXR showing pulmonary vascular redistribution) Yes- + 1 No- 0   History of CVA   (Prior stroke or TIA) Yes- + 1 No- 0   Pre-operative treatment with insulin Yes- + 1 No- 0   Pre-operative creatinine > 2mg/dl Yes- + 1 No- 0   Total: 0      Risk Status:  Estimated risk of cardiac complications after non-cardiac surgery using the Revised Cardiac Risk Index for Preoperative risk is 3.9 %      ARISCAT (Canet) risk index: Low: 1.6% risk of post-op pulmonary complications.    American Society of Anesthesiologists Physical Status classification (ASA): 3             Outpatient Subjective & Objective

## 2024-03-26 NOTE — ASSESSMENT & PLAN NOTE
Patient denies diagnosis  Current BP  controlled today.    Taking: ramipril  Home BP readings range: 115-120s/70-75    Lifestyle changes to reduce systolic BP:   exercise 30 minutes per day,  5 days per week or 150 minutes weekly; sodium reduction and avoidance of high salt foods such as processed meats, frozen meals and  fast foods.   Keeping a healthy weight/BMI can help with better BP control    BP acceptable for surgery. I recommend monitoring BP during perioperative period as uncontrolled pain can elevate blood pressure.

## 2024-03-26 NOTE — ASSESSMENT & PLAN NOTE
Diagnosed: 2009; S/P TURBT  Surveillance Cystoscopy yearly; recent hematuria  Scheduled with Dr. Kyle on 4/8/24 for TURBT.

## 2024-03-26 NOTE — ASSESSMENT & PLAN NOTE
Denies History of MI/stents  Treated with: statin  Followed per Cardiology; last seen 12/5/23    Denies symptoms of  CP/SOB/PND/Orthopnea/Palpitations/Syncope  Encouraged physical activity of at least 30 minutes of moderate aerobic activity 5 days weekly.

## 2024-03-26 NOTE — ASSESSMENT & PLAN NOTE
Recommend healthy diet (DASH/Mediterranean) and exercise.   Patient should exercise 30 minutes at least five times weekly once recovered from surgery. Limit alcohol.  Treated with: Crestor    Component      Latest Ref Rng 12/4/2023   Cholesterol Total      120 - 199 mg/dL 159    Triglycerides      30 - 150 mg/dL 82    HDL      40 - 75 mg/dL 65    LDL Cholesterol      63.0 - 159.0 mg/dL 77.6    HDL/Cholesterol Ratio      20.0 - 50.0 % 40.9    Total Cholesterol/HDL Ratio      2.0 - 5.0  2.4    Non-HDL Cholesterol      mg/dL 94

## 2024-03-26 NOTE — ASSESSMENT & PLAN NOTE
CPAP compliance: Yes.   Recommend caution with sedating medication that can cause respiratory depression.

## 2024-03-27 ENCOUNTER — OFFICE VISIT (OUTPATIENT)
Dept: INTERNAL MEDICINE | Facility: CLINIC | Age: 84
End: 2024-03-27
Payer: MEDICARE

## 2024-03-27 ENCOUNTER — LAB VISIT (OUTPATIENT)
Dept: LAB | Facility: HOSPITAL | Age: 84
End: 2024-03-27
Attending: ANESTHESIOLOGY
Payer: MEDICARE

## 2024-03-27 VITALS
TEMPERATURE: 98 F | SYSTOLIC BLOOD PRESSURE: 128 MMHG | HEART RATE: 51 BPM | OXYGEN SATURATION: 98 % | WEIGHT: 211.63 LBS | BODY MASS INDEX: 29.63 KG/M2 | HEIGHT: 71 IN | DIASTOLIC BLOOD PRESSURE: 70 MMHG

## 2024-03-27 DIAGNOSIS — Z01.818 PREOPERATIVE TESTING: ICD-10-CM

## 2024-03-27 DIAGNOSIS — N40.0 BENIGN PROSTATIC HYPERPLASIA, UNSPECIFIED WHETHER LOWER URINARY TRACT SYMPTOMS PRESENT: ICD-10-CM

## 2024-03-27 DIAGNOSIS — G47.33 OSA (OBSTRUCTIVE SLEEP APNEA): ICD-10-CM

## 2024-03-27 DIAGNOSIS — C67.9 MALIGNANT NEOPLASM OF URINARY BLADDER, UNSPECIFIED SITE: ICD-10-CM

## 2024-03-27 DIAGNOSIS — R73.03 PREDIABETES: ICD-10-CM

## 2024-03-27 DIAGNOSIS — I25.10 CORONARY ARTERY DISEASE INVOLVING NATIVE CORONARY ARTERY OF NATIVE HEART WITHOUT ANGINA PECTORIS: ICD-10-CM

## 2024-03-27 DIAGNOSIS — Z01.818 PREOPERATIVE EXAMINATION: Primary | ICD-10-CM

## 2024-03-27 DIAGNOSIS — E78.2 MIXED HYPERLIPIDEMIA: ICD-10-CM

## 2024-03-27 DIAGNOSIS — I10 PRIMARY HYPERTENSION: ICD-10-CM

## 2024-03-27 LAB
ANION GAP SERPL CALC-SCNC: 8 MMOL/L (ref 8–16)
BUN SERPL-MCNC: 16 MG/DL (ref 8–23)
CALCIUM SERPL-MCNC: 10.4 MG/DL (ref 8.7–10.5)
CHLORIDE SERPL-SCNC: 108 MMOL/L (ref 95–110)
CO2 SERPL-SCNC: 25 MMOL/L (ref 23–29)
CREAT SERPL-MCNC: 0.9 MG/DL (ref 0.5–1.4)
ERYTHROCYTE [DISTWIDTH] IN BLOOD BY AUTOMATED COUNT: 13.4 % (ref 11.5–14.5)
EST. GFR  (NO RACE VARIABLE): >60 ML/MIN/1.73 M^2
GLUCOSE SERPL-MCNC: 105 MG/DL (ref 70–110)
HCT VFR BLD AUTO: 48.5 % (ref 40–54)
HGB BLD-MCNC: 15.9 G/DL (ref 14–18)
MCH RBC QN AUTO: 32.1 PG (ref 27–31)
MCHC RBC AUTO-ENTMCNC: 32.8 G/DL (ref 32–36)
MCV RBC AUTO: 98 FL (ref 82–98)
PLATELET # BLD AUTO: 188 K/UL (ref 150–450)
PMV BLD AUTO: 10.8 FL (ref 9.2–12.9)
POTASSIUM SERPL-SCNC: 4.9 MMOL/L (ref 3.5–5.1)
RBC # BLD AUTO: 4.95 M/UL (ref 4.6–6.2)
SODIUM SERPL-SCNC: 141 MMOL/L (ref 136–145)
WBC # BLD AUTO: 8.51 K/UL (ref 3.9–12.7)

## 2024-03-27 PROCEDURE — 85027 COMPLETE CBC AUTOMATED: CPT | Performed by: ANESTHESIOLOGY

## 2024-03-27 PROCEDURE — 36415 COLL VENOUS BLD VENIPUNCTURE: CPT | Performed by: ANESTHESIOLOGY

## 2024-03-27 PROCEDURE — 99214 OFFICE O/P EST MOD 30 MIN: CPT | Mod: PBBFAC | Performed by: NURSE PRACTITIONER

## 2024-03-27 PROCEDURE — 99999 PR PBB SHADOW E&M-EST. PATIENT-LVL IV: CPT | Mod: PBBFAC,,, | Performed by: NURSE PRACTITIONER

## 2024-03-27 PROCEDURE — 99215 OFFICE O/P EST HI 40 MIN: CPT | Mod: S$PBB,,, | Performed by: NURSE PRACTITIONER

## 2024-03-27 PROCEDURE — 80048 BASIC METABOLIC PNL TOTAL CA: CPT | Performed by: ANESTHESIOLOGY

## 2024-03-27 NOTE — PROGRESS NOTES
[facility]  Progress Note    Patient Name: Gurjit Valentin  MRN: [id number]  Date of Evaluation- 03/28/2024  PCP- Huseyin Rice MD    Future cases for Francisco Valentin [[id number]]       Case ID Status Date Time Miah Procedure Provider Location    [id number] [facility] 4/8/2024 11:40 AM 45 TURBT (TRANSURETHRAL RESECTION OF BLADDER TUMOR) Jose Kyle MD [[id number]] [facility] OR 1ST FLR            HPI:  This is a 83 y.o. male  who presents today for a preoperative evaluation in preparation for transurethral resection of bladder tumor.   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   Patient is new to me.  The history has been obtained by speaking with the patient and reviewing the electronic medical record and/or outside health information. Significant health conditions for the perioperative period are discussed below in assessment and plan.   Patient reports current health status to be Good.  Denies any new symptoms before surgery.       Subjective/ Objective:     Chief Complaint: Preoperative evaulation, perioperative medical management, and complication reduction plan.     Functional Capacity: no regular exercise regimen, parked in garage and walked to POC without chest pain or SOB.       Anesthesia issues: None    Difficulty mouth opening: No    Steroid use in the last 12 months:  left knee    Dental Issues: None    Family anesthesia difficulty: None     Family Hx of Thrombosis: None    Past Medical History:   Diagnosis Date    Allergy     Basal cell carcinoma     Basal cell carcinoma of right cheek 03/29/2023    R mid cheek    Bladder cancer     BPH with obstruction/lower urinary tract symptoms     Cataract     Chronic cough     Coronary artery disease     Ganglion cyst of wrist, left     Hyperlipidemia     Increased prostate specific antigen (PSA) velocity 10/15/2015    Insomnia     Kidney stone     Personal history of colonic polyps     Prediabetes     Skin cancer     Sleep  apnea     CPAP    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    right angle of mandible    Transient global amnesia     x2         Past Medical History Pertinent Negatives:   Diagnosis Date Noted    AAA (abdominal aortic aneurysm) 07/18/2014    Acute coronary syndrome 07/18/2014    Anemia 01/31/2013    Asthma 01/31/2013    Asthma 07/18/2014    Atrial fibrillation 07/18/2014    Atrial flutter 07/18/2014    Cardiomyopathy 07/18/2014    Carotid artery occlusion 07/18/2014    CHF (congestive heart failure) 07/18/2014    Clotting disorder 01/31/2013    COPD (chronic obstructive pulmonary disease) 01/31/2013    Diabetes mellitus 01/31/2013    Diabetes mellitus 07/18/2014    Glaucoma 01/31/2013    Heart block 07/18/2014    Heart murmur 07/18/2014    HIV infection 01/31/2013    Liver disease 01/31/2013    Peripheral vascular disease 01/31/2013    STD (sexually transmitted disease) 01/31/2013    Stenosis and insufficiency of lacrimal passages 07/18/2014    Stroke 01/31/2013    Supraventricular tachycardia 07/18/2014    Syncope and collapse 07/18/2014    Thyroid disease 07/18/2014    Urinary tract infection 01/31/2013    Valvular regurgitation 07/18/2014    Ventricular tachycardia 07/18/2014         Past Surgical History:   Procedure Laterality Date    BLADDER SURGERY      Ca excision    CARDIAC CATHETERIZATION      COLONOSCOPY N/A 12/04/2017    No further screening planned.    CYSTOSCOPY      EYE SURGERY Bilateral     Cataract    KIDNEY STONE SURGERY      LITHOTRIPSY      TONSILLECTOMY      at age 4       Review of Systems   Constitutional:  Negative for chills, fatigue, fever and unexpected weight change.   HENT:  Negative for congestion, hearing loss, rhinorrhea, sore throat, tinnitus and trouble swallowing.    Eyes:  Negative for visual disturbance.   Respiratory:  Negative for cough, chest tightness, shortness of breath and wheezing.    Cardiovascular:  Negative for chest pain, palpitations and leg swelling.  "  Gastrointestinal:  Negative for constipation and diarrhea.        Denies Fatty liver, Hepatitis   Genitourinary:  Negative for decreased urine volume, difficulty urinating, dysuria, frequency, hematuria and urgency.   Musculoskeletal:  Positive for back pain (occasional mid-lower). Negative for arthralgias, neck pain and neck stiffness.   Skin:  Positive for rash (outer bilateral thigh- chronic). Negative for wound.   Neurological:  Negative for dizziness, syncope, weakness, numbness and headaches.   Hematological:  Does not bruise/bleed easily.   Psychiatric/Behavioral:  Negative for sleep disturbance and suicidal ideas.               VITALS  Visit Vitals  /70 (BP Location: Left arm, Patient Position: Sitting)   Pulse (!) 51   Temp 97.6 °F (36.4 °C) (Oral)   Ht 5' 11" (1.803 m)   Wt 96 kg (211 lb 10.3 oz)   SpO2 98%   BMI 29.52 kg/m²          Physical Exam  Vitals reviewed.   Constitutional:       General: He is not in acute distress.     Appearance: He is well-developed.   HENT:      Head: Normocephalic.      Nose: Nose normal.      Mouth/Throat:      Pharynx: No oropharyngeal exudate.   Eyes:      General:         Right eye: No discharge.         Left eye: No discharge.      Conjunctiva/sclera: Conjunctivae normal.      Pupils: Pupils are equal, round, and reactive to light.   Neck:      Thyroid: No thyromegaly.      Vascular: No carotid bruit or JVD.      Trachea: No tracheal deviation.   Cardiovascular:      Rate and Rhythm: Regular rhythm. Bradycardia present.      Pulses:           Carotid pulses are 2+ on the right side and 2+ on the left side.       Dorsalis pedis pulses are 2+ on the right side and 2+ on the left side.        Posterior tibial pulses are 2+ on the right side and 2+ on the left side.      Heart sounds: Normal heart sounds. No murmur heard.  Pulmonary:      Effort: Pulmonary effort is normal. No respiratory distress.      Breath sounds: Normal breath sounds. No stridor. No wheezing, " rhonchi or rales.   Abdominal:      General: Bowel sounds are normal. There is no distension.      Palpations: Abdomen is soft.      Tenderness: There is no abdominal tenderness. There is no guarding.   Musculoskeletal:      Cervical back: Normal range of motion. No pain with movement.      Right lower leg: Edema (trace) present.      Left lower leg: Edema (trace) present.   Lymphadenopathy:      Cervical: No cervical adenopathy.   Skin:     General: Skin is warm and dry.      Capillary Refill: Capillary refill takes less than 2 seconds.      Findings: No erythema or rash.   Neurological:      Mental Status: He is alert and oriented to person, place, and time.   Psychiatric:         Behavior: Behavior normal.          Significant Labs:  Lab Results   Component Value Date    WBC 8.51 03/27/2024    HGB 15.9 03/27/2024    HCT 48.5 03/27/2024     03/27/2024    CHOL 159 12/04/2023    TRIG 82 12/04/2023    HDL 65 12/04/2023    ALT 14 12/04/2023    AST 16 12/04/2023     03/27/2024    K 4.9 03/27/2024     03/27/2024    CREATININE 0.9 03/27/2024    BUN 16 03/27/2024    CO2 25 03/27/2024    TSH 2.395 12/04/2023    PSA 1.8 11/09/2022    INR 1.0 06/01/2010    HGBA1C 6.1 (H) 12/04/2023           EKG:   Results for orders placed or performed during the hospital encounter of 03/28/24   EKG 12-lead    Collection Time: 03/28/24  8:51 AM   Result Value Ref Range    QRS Duration 88 ms    OHS QTC Calculation 364 ms    Narrative    Test Reason : Z01.818,I10,    Vent. Rate : 053 BPM     Atrial Rate : 053 BPM     P-R Int : 190 ms          QRS Dur : 088 ms      QT Int : 388 ms       P-R-T Axes : 072 051 058 degrees     QTc Int : 364 ms    Sinus bradycardia  Nonspecific T wave abnormality  When compared with ECG of 12-JUN-2023 15:28,    Borderline criteria for Inferior infarct are now Present  Nonspecific T wave abnormality, worse in Lateral leads  Confirmed by SUNIL PALOMO MD ([id number]) on 3/28/2024 10:33:06 AM    Referred  "By: RHONDA LEOPOLD           Confirmed By:SUNIL PALOMO MD       2D ECHO:  TTE:  Results for orders placed or performed during the hospital encounter of 10/29/21   Echo Color Flow Doppler? Yes   Result Value Ref Range    Ascending aorta 2.99 cm    STJ 2.94 cm    AV mean gradient 3 mmHg    Ao peak miguel angel 1.20 m/s    Ao VTI 25.98 cm    IVS 0.92 0.6 - 1.1 cm    LA size 4.21 cm    Left Atrium Major Axis 5.32 cm    Left Atrium Minor Axis 5.36 cm    LVIDd 5.20 3.5 - 6.0 cm    LVIDs 3.39 2.1 - 4.0 cm    LVOT peak VTI 21.98 cm    Posterior Wall 0.81 0.6 - 1.1 cm    MV Peak A Miguel Angel 0.76 m/s    E wave deceleration time 238.41 msec    MV Peak E Miguel Angel 0.65 m/s    PV Peak D Miguel Angel 0.31 m/s    PV Peak S Miguel Angel 0.42 m/s    RA Major Axis 5.23 cm    RA Width 3.74 cm    RVDD 3.75 cm    Sinus 3.33 cm    TAPSE 1.96 cm    TR Max Miguel Angel 2.49 m/s    TDI LATERAL 0.07 m/s    TDI SEPTAL 0.07 m/s    LA WIDTH 4.20 cm    MV stenosis pressure 1/2 time 69.14 ms    LV Diastolic Volume 129.46 mL    LV Systolic Volume 47.08 mL    RV S' 10.41 cm/s    LVOT peak miguel angel 0.94 m/s    LA volume (mod) 53.05 cm3    MV "A" wave duration 8.56 msec    LV LATERAL E/E' RATIO 9.29 m/s    LV SEPTAL E/E' RATIO 9.29 m/s    FS 35 %    LA volume 80.26 cm3    LV mass 160.51 g    Left Ventricle Relative Wall Thickness 0.31 cm    AV Velocity Ratio 0.78     AV index (prosthetic) 0.85     MV valve area p 1/2 method 3.18 cm2    E/A ratio 0.86     Mean e' 0.07 m/s    Pulm vein S/D ratio 1.35     AV peak gradient 6 mmHg    E/E' ratio 9.29 m/s    Triscuspid Valve Regurgitation Peak Gradient 25 mmHg    BSA 2.24 m2    LV Systolic Volume Index 21.4 mL/m2    LV Diastolic Volume Index 58.85 mL/m2    LA Volume Index 36.5 mL/m2    LV Mass Index 73 g/m2    LA Volume Index (Mod) 24.1 mL/m2    Right Atrial Pressure (from IVC) 3 mmHg    EF 60 %    TV resting pulmonary artery pressure 28 mmHg    Narrative    · The left ventricle is normal in size with normal systolic function. The   estimated ejection " fraction is 60%.  · Normal right ventricular size with normal right ventricular systolic   function.  · Indeterminate left ventricular diastolic function.  · Mild left atrial enlargement.  · The estimated PA systolic pressure is 28 mmHg.  · Normal central venous pressure (3 mmHg).        Treadmill Stress 5/26/23  The left ventricle is normal in size with normal systolic function. The estimated ejection fraction is 60%.  Normal right ventricular size with normal right ventricular systolic function.  Indeterminate left ventricular diastolic function.  Mild left atrial enlargement.  The estimated PA systolic pressure is 29 mmHg.  Normal central venous pressure (3 mmHg).  The patient's exercise capacity was normal.  The ECG portion of this study is abnormal but not diagnostic for ischemia.  The stress echo portion of this study is negative for myocardial ischemia.  Overall, this study is negative for myocardial ischemia.            Active Cardiac Conditions: None      Revised Cardiac Risk Index   High -Risk Surgery  Intraperitoneal; Intrathoracic; suprainguinal vascular Yes- + 1 No- 0   History of Ischemic Heart Disease   (Hx of MI/positive exercise test/current chest pain due to ischemia/use of nitrate therapy/EKG with pathological Q waves) Yes- + 1 No- 0   History of CHF  (Pulmonary edema/bilateral rales or S3 gallop/PND/CXR showing pulmonary vascular redistribution) Yes- + 1 No- 0   History of CVA   (Prior stroke or TIA) Yes- + 1 No- 0   Pre-operative treatment with insulin Yes- + 1 No- 0   Pre-operative creatinine > 2mg/dl Yes- + 1 No- 0   Total: 0      Risk Status:  Estimated risk of cardiac complications after non-cardiac surgery using the Revised Cardiac Risk Index for Preoperative risk is 3.9 %      ARISCAT (Canet) risk index: Low: 1.6% risk of post-op pulmonary complications.    American Society of Anesthesiologists Physical Status classification (ASA): 3                            Assessment/Plan:     Bladder  cancer  Diagnosed: 2009; S/P TURBT  Surveillance Cystoscopy yearly; recent hematuria  Scheduled with Dr. Kyle on 4/8/24 for TURBT.    Coronary artery disease: Known 50% LAD stenosis.  Denies History of MI/stents  Treated with: statin  Followed per Cardiology; last seen 12/5/23    Denies symptoms of  CP/SOB/PND/Orthopnea/Palpitations/Syncope  Encouraged physical activity of at least 30 minutes of moderate aerobic activity 5 days weekly.      Hyperlipidemia: LDL at goal  Recommend healthy diet (DASH/Mediterranean) and exercise.   Patient should exercise 30 minutes at least five times weekly once recovered from surgery. Limit alcohol.  Treated with: Crestor    Component      Latest Ref Rng 12/4/2023   Cholesterol Total      120 - 199 mg/dL 159    Triglycerides      30 - 150 mg/dL 82    HDL      40 - 75 mg/dL 65    LDL Cholesterol      63.0 - 159.0 mg/dL 77.6    HDL/Cholesterol Ratio      20.0 - 50.0 % 40.9    Total Cholesterol/HDL Ratio      2.0 - 5.0  2.4    Non-HDL Cholesterol      mg/dL 94          Hypertension  Patient denies diagnosis  Current BP  controlled today.    Taking: ramipril  Home BP readings range: 115-120s/70-75    Lifestyle changes to reduce systolic BP:   exercise 30 minutes per day,  5 days per week or 150 minutes weekly; sodium reduction and avoidance of high salt foods such as processed meats, frozen meals and  fast foods.   Keeping a healthy weight/BMI can help with better BP control    BP acceptable for surgery. I recommend monitoring BP during perioperative period as uncontrolled pain can elevate blood pressure.         DELVIN (obstructive sleep apnea)  CPAP compliance: Yes.   Recommend caution with sedating medication that can cause respiratory depression.         BPH (benign prostatic hyperplasia)  Not currently treated   Denies LUTS  There is an increased risk of post-op urinary retention.    Prediabetes  A1c is 6.1  I recommend monitoring patient's glucose level during the perioperative  period. Glucose may be elevated from stress hyperglycemia and may require insulin.      Discussion/Management of Perioperative Care    Thromboembolic prophylaxis (VTE) Care: Risk factors for thrombosis include: age and surgical procedure.  I recommend prophylaxis of thromboembolism with the use of compression stockings/pneumatic devices, and/or pharmacologic agents. The benefits should outweigh the risks for pharmacologic prophylaxis in the perioperative period. I also encourage early ambulation if not contraindicated during the post-operative period.    Risk factors for post-operative pulmonary complications include:age > 65 years and HTN. To reduce the risk of pulmonary complications, prophylactic recommendations include: incentive spirometry use/deep breathing, end tidal carbon dioxide monitoring, and pain control.      Risk factors for renal complications include: age and HTN. To reduce the risk of postoperative renal complications, I recommend the patient maintain adequate fluid volume status by drinking 2 liters of water daily.  Avoid/reduce NSAIDS and ATKINSON-2 inhibitors use as well as IV contrast for renal protection.    I recommend the use of appropriate prophylactic antibiotics to reduce the risk of surgical site infections.    Delirium risk factors include advanced age. I recommend to avoid/reduce use of benzodiazepine use (not for patients who take on a regular basis), anticholinergics, Benadryl,  and agents that may cause postoperative serotonin syndrome.  Controlled pain can decrease the risk for postop delirium and since opioids are used for postoperative pain control, I suggest using the lowest dose for the shortest amount of time necessary for pain management.     The patient is at an increased risk for urinary retention due to : urological procedure and advanced age. I recommend to avoid/decrease the use of benzodiazepines, anticholinergics, and Benadryl in the perioperative period. I also recommend  using opioids for the shortest period of time if possible.          This visit was focused on Preoperative evaluation, Perioperative Medical management, complication reduction plans. I suggest that the patient follows up with primary care or relevant sub specialists for ongoing health care.    I appreciate the opportunity to be involved in this patients care. Please feel free to contact me if there were any questions about this consultation.        I spent a total of 58 minutes on the day of the visit.This includes face to face time and non-face to face time preparing to see the patient (e.g., review of tests), obtaining and/or reviewing separately obtained history, documenting clinical information in the electronic or other health record, independently interpreting results and communicating results to the patient/family/caregiver, or care coordinator.       Patient is optimized for surgery.         Rochelle Thomason NP  Perioperative Medicine  [facility]

## 2024-03-27 NOTE — DISCHARGE INSTRUCTIONS
Your surgery has been scheduled for:____//___________________________________    You should report to:  _X___[facility], located on the [place] side of the first floor of the           [facility] ([phone])  ____[facility], located on the [facility] side of the            Second floor of the [facility] ([phone])  ____3rd Floor [facility] located on the [facility] side of the [facility] [phone]  ____[facility]: located at [address] [id number]. [address]     Please Note   Tell your doctor if you take Aspirin, products containing Aspirin, herbal medications  or blood thinners, such as Coumadin, Ticlid, or Plavix.  (Consult your provider regarding holding or stopping before surgery).  Arrange for someone to drive you home following surgery.  You will not be allowed to leave the surgical facility alone or drive yourself home following sedation and anesthesia.    Before Surgery  Stop taking all herbal medications, vitamins, and supplements 7 days prior to surgery  No Motrin/Advil (Ibuprofen) 7 days before surgery  No Aleve (Naproxen) 7 days before surgery   No Goody's/BC Powder 7 days before surgery  Refrain from drinking alcoholic beverages for 24 hours before and after surgery  Stop or limit smoking at least 24 hours prior to surgery  You may take Tylenol for pain    Night before Surgery  Do not eat or drink after midnight  Take a shower or bath (shower is recommended).  Bathe with Hibiclens soap or an antibacterial soap from the neck down.  If not supplied by your surgeon, hibiclens soap will need to be purchased over the counter in pharmacy.  Rinse soap off thoroughly.  Shampoo your hair with your regular shampoo    The Day of Surgery  Take another bath or shower with hibiclens or any antibacterial soap, to reduce the chance of infection.  Take heart and blood  pressure medications with a small sip of water, as advised by the perioperative team.  Do not take fluid pills  You may brush your teeth and rinse your mouth, but do not swallow any additional water.   Do not apply perfumes, powder, body lotions or deodorant on the day of surgery.  Nail polish should be removed.  Do not wear makeup or moisturizer  Wear comfortable clothes, such as a button front shirt and loose fitting pants.  Leave all jewelry, including body piercings, and valuables at home.    Bring any devices you will need after surgery such as crutches or canes.  If you have sleep apnea, please bring your CPAP machine  In the event that your physical condition changes including the onset of a cold or respiratory illness, or if you have to delay or cancel your surgery, please notify your surgeon.

## 2024-03-27 NOTE — ASSESSMENT & PLAN NOTE
A1c is 6.1  I recommend monitoring patient's glucose level during the perioperative period. Glucose may be elevated from stress hyperglycemia and may require insulin.

## 2024-03-28 ENCOUNTER — HOSPITAL ENCOUNTER (OUTPATIENT)
Dept: CARDIOLOGY | Facility: CLINIC | Age: 84
Discharge: HOME | End: 2024-03-28
Payer: MEDICARE

## 2024-03-28 DIAGNOSIS — Z01.818 PREOPERATIVE TESTING: ICD-10-CM

## 2024-03-28 DIAGNOSIS — I10 PRIMARY HYPERTENSION: ICD-10-CM

## 2024-03-28 LAB
OHS QRS DURATION: 88 MS
OHS QTC CALCULATION: 364 MS

## 2024-03-28 PROCEDURE — 93005 ELECTROCARDIOGRAM TRACING: CPT | Mod: PBBFAC | Performed by: INTERNAL MEDICINE

## 2024-03-28 PROCEDURE — 93010 ELECTROCARDIOGRAM REPORT: CPT | Mod: S$PBB,,, | Performed by: INTERNAL MEDICINE

## 2024-04-08 ENCOUNTER — ANESTHESIA EVENT (OUTPATIENT)
Dept: SURGERY | Facility: HOSPITAL | Age: 84
End: 2024-04-08
Payer: MEDICARE

## 2024-04-08 ENCOUNTER — HOSPITAL ENCOUNTER (OUTPATIENT)
Facility: HOSPITAL | Age: 84
Discharge: HOME | End: 2024-04-08
Attending: UROLOGY | Admitting: UROLOGY
Payer: MEDICARE

## 2024-04-08 ENCOUNTER — ANESTHESIA (OUTPATIENT)
Dept: SURGERY | Facility: HOSPITAL | Age: 84
End: 2024-04-08
Payer: MEDICARE

## 2024-04-08 VITALS
TEMPERATURE: 98 F | BODY MASS INDEX: 29.4 KG/M2 | SYSTOLIC BLOOD PRESSURE: 125 MMHG | DIASTOLIC BLOOD PRESSURE: 75 MMHG | WEIGHT: 210 LBS | HEART RATE: 59 BPM | OXYGEN SATURATION: 97 % | RESPIRATION RATE: 16 BRPM | HEIGHT: 71 IN

## 2024-04-08 DIAGNOSIS — I10 PRIMARY HYPERTENSION: ICD-10-CM

## 2024-04-08 DIAGNOSIS — R31.0 GROSS HEMATURIA: Primary | ICD-10-CM

## 2024-04-08 DIAGNOSIS — Z01.818 PREOPERATIVE TESTING: ICD-10-CM

## 2024-04-08 PROCEDURE — 37000008 HC ANESTHESIA 1ST 15 MINUTES: Performed by: UROLOGY

## 2024-04-08 PROCEDURE — 71000015 HC POSTOP RECOV 1ST HR: Performed by: UROLOGY

## 2024-04-08 PROCEDURE — 36000707: Performed by: UROLOGY

## 2024-04-08 PROCEDURE — 25000003 PHARM REV CODE 250

## 2024-04-08 PROCEDURE — 52234 CYSTOSCOPY AND TREATMENT: CPT | Mod: ,,, | Performed by: UROLOGY

## 2024-04-08 PROCEDURE — D9220A PRA ANESTHESIA: Mod: ANES,,, | Performed by: ANESTHESIOLOGY

## 2024-04-08 PROCEDURE — 88307 TISSUE EXAM BY PATHOLOGIST: CPT | Performed by: STUDENT IN AN ORGANIZED HEALTH CARE EDUCATION/TRAINING PROGRAM

## 2024-04-08 PROCEDURE — D9220A PRA ANESTHESIA: Mod: CRNA,,, | Performed by: NURSE ANESTHETIST, CERTIFIED REGISTERED

## 2024-04-08 PROCEDURE — 63600175 PHARM REV CODE 636 W HCPCS

## 2024-04-08 PROCEDURE — 37000009 HC ANESTHESIA EA ADD 15 MINS: Performed by: UROLOGY

## 2024-04-08 PROCEDURE — 27201423 OPTIME MED/SURG SUP & DEVICES STERILE SUPPLY: Performed by: UROLOGY

## 2024-04-08 PROCEDURE — 36000706: Performed by: UROLOGY

## 2024-04-08 PROCEDURE — 88307 TISSUE EXAM BY PATHOLOGIST: CPT | Mod: 26,,, | Performed by: STUDENT IN AN ORGANIZED HEALTH CARE EDUCATION/TRAINING PROGRAM

## 2024-04-08 PROCEDURE — 71000044 HC DOSC ROUTINE RECOVERY FIRST HOUR: Performed by: UROLOGY

## 2024-04-08 RX ORDER — MEPERIDINE HYDROCHLORIDE 50 MG/ML
12.5 INJECTION INTRAMUSCULAR; INTRAVENOUS; SUBCUTANEOUS ONCE AS NEEDED
Status: DISCONTINUED | OUTPATIENT
Start: 2024-04-08 | End: 2024-04-08 | Stop reason: HOSPADM

## 2024-04-08 RX ORDER — DEXMEDETOMIDINE HYDROCHLORIDE 100 UG/ML
INJECTION, SOLUTION INTRAVENOUS
Status: DISCONTINUED | OUTPATIENT
Start: 2024-04-08 | End: 2024-04-08

## 2024-04-08 RX ORDER — ONDANSETRON HYDROCHLORIDE 2 MG/ML
4 INJECTION, SOLUTION INTRAVENOUS DAILY PRN
Status: DISCONTINUED | OUTPATIENT
Start: 2024-04-08 | End: 2024-04-08 | Stop reason: HOSPADM

## 2024-04-08 RX ORDER — PHENYLEPHRINE HYDROCHLORIDE 10 MG/ML
INJECTION INTRAVENOUS
Status: DISCONTINUED | OUTPATIENT
Start: 2024-04-08 | End: 2024-04-08

## 2024-04-08 RX ORDER — SODIUM CHLORIDE 9 MG/ML
INJECTION, SOLUTION INTRAVENOUS CONTINUOUS
Status: DISCONTINUED | OUTPATIENT
Start: 2024-04-08 | End: 2024-04-08 | Stop reason: HOSPADM

## 2024-04-08 RX ORDER — ONDANSETRON HYDROCHLORIDE 2 MG/ML
INJECTION, SOLUTION INTRAVENOUS
Status: DISCONTINUED | OUTPATIENT
Start: 2024-04-08 | End: 2024-04-08

## 2024-04-08 RX ORDER — OXYCODONE HYDROCHLORIDE 5 MG/1
5 TABLET ORAL EVERY 4 HOURS PRN
Qty: 5 TABLET | Refills: 0 | Status: SHIPPED | OUTPATIENT
Start: 2024-04-08

## 2024-04-08 RX ORDER — ONDANSETRON HYDROCHLORIDE 2 MG/ML
4 INJECTION, SOLUTION INTRAVENOUS DAILY PRN
Status: DISCONTINUED | OUTPATIENT
Start: 2024-04-08 | End: 2024-04-08

## 2024-04-08 RX ORDER — HYDROMORPHONE HYDROCHLORIDE 1 MG/ML
0.2 INJECTION, SOLUTION INTRAMUSCULAR; INTRAVENOUS; SUBCUTANEOUS EVERY 5 MIN PRN
Status: DISCONTINUED | OUTPATIENT
Start: 2024-04-08 | End: 2024-04-08

## 2024-04-08 RX ORDER — HYDROMORPHONE HYDROCHLORIDE 1 MG/ML
0.2 INJECTION, SOLUTION INTRAMUSCULAR; INTRAVENOUS; SUBCUTANEOUS EVERY 5 MIN PRN
Status: DISCONTINUED | OUTPATIENT
Start: 2024-04-08 | End: 2024-04-08 | Stop reason: HOSPADM

## 2024-04-08 RX ORDER — ROCURONIUM BROMIDE 10 MG/ML
INJECTION, SOLUTION INTRAVENOUS
Status: DISCONTINUED | OUTPATIENT
Start: 2024-04-08 | End: 2024-04-08

## 2024-04-08 RX ORDER — SODIUM CHLORIDE 9 MG/ML
INJECTION, SOLUTION INTRAVENOUS CONTINUOUS
Status: DISCONTINUED | OUTPATIENT
Start: 2024-04-08 | End: 2024-04-08

## 2024-04-08 RX ORDER — LIDOCAINE HYDROCHLORIDE 20 MG/ML
INJECTION, SOLUTION EPIDURAL; INFILTRATION; INTRACAUDAL; PERINEURAL
Status: DISCONTINUED | OUTPATIENT
Start: 2024-04-08 | End: 2024-04-08

## 2024-04-08 RX ORDER — PROPOFOL 10 MG/ML
VIAL (ML) INTRAVENOUS
Status: DISCONTINUED | OUTPATIENT
Start: 2024-04-08 | End: 2024-04-08

## 2024-04-08 RX ORDER — FENTANYL CITRATE 50 UG/ML
INJECTION, SOLUTION INTRAMUSCULAR; INTRAVENOUS
Status: DISCONTINUED | OUTPATIENT
Start: 2024-04-08 | End: 2024-04-08

## 2024-04-08 RX ORDER — DEXAMETHASONE SODIUM PHOSPHATE 4 MG/ML
INJECTION, SOLUTION INTRA-ARTICULAR; INTRALESIONAL; INTRAMUSCULAR; INTRAVENOUS; SOFT TISSUE
Status: DISCONTINUED | OUTPATIENT
Start: 2024-04-08 | End: 2024-04-08

## 2024-04-08 RX ORDER — MEPERIDINE HYDROCHLORIDE 50 MG/ML
12.5 INJECTION INTRAMUSCULAR; INTRAVENOUS; SUBCUTANEOUS ONCE AS NEEDED
Status: DISCONTINUED | OUTPATIENT
Start: 2024-04-08 | End: 2024-04-08

## 2024-04-08 RX ADMIN — LIDOCAINE HYDROCHLORIDE 100 MG: 20 INJECTION, SOLUTION EPIDURAL; INFILTRATION; INTRACAUDAL; PERINEURAL at 12:04

## 2024-04-08 RX ADMIN — ROCURONIUM BROMIDE 50 MG: 10 INJECTION, SOLUTION INTRAVENOUS at 12:04

## 2024-04-08 RX ADMIN — SODIUM CHLORIDE: 0.9 INJECTION, SOLUTION INTRAVENOUS at 12:04

## 2024-04-08 RX ADMIN — PROPOFOL 100 MG: 10 INJECTION, EMULSION INTRAVENOUS at 12:04

## 2024-04-08 RX ADMIN — DEXMEDETOMIDINE 4 MCG: 100 INJECTION, SOLUTION, CONCENTRATE INTRAVENOUS at 12:04

## 2024-04-08 RX ADMIN — FENTANYL CITRATE 50 MCG: 50 INJECTION, SOLUTION INTRAMUSCULAR; INTRAVENOUS at 12:04

## 2024-04-08 RX ADMIN — CEFAZOLIN 2 G: 2 INJECTION, POWDER, FOR SOLUTION INTRAMUSCULAR; INTRAVENOUS at 12:04

## 2024-04-08 RX ADMIN — GLYCOPYRROLATE 0.2 MG: 0.2 INJECTION, SOLUTION INTRAMUSCULAR; INTRAVENOUS at 12:04

## 2024-04-08 RX ADMIN — ONDANSETRON 4 MG: 2 INJECTION INTRAMUSCULAR; INTRAVENOUS at 12:04

## 2024-04-08 RX ADMIN — DEXAMETHASONE SODIUM PHOSPHATE 4 MG: 4 INJECTION, SOLUTION INTRAMUSCULAR; INTRAVENOUS at 12:04

## 2024-04-08 RX ADMIN — PHENYLEPHRINE HYDROCHLORIDE 100 MCG: 10 INJECTION INTRAVENOUS at 12:04

## 2024-04-08 RX ADMIN — SUGAMMADEX 200 MG: 100 INJECTION, SOLUTION INTRAVENOUS at 12:04

## 2024-04-08 NOTE — OP NOTE
[facility]  Operative Note    Date: 04/08/2024    Pre-Op Diagnosis: bladder tumor    Post-Op Diagnosis: same    Procedure(s) Performed:   1.  TURBT of <2cm sized bladder tumor    Specimen(s): Bladder tumor    Staff Surgeon: Jose Kyle MD    Assistant Surgeon: TESSA WELLER MD    Anesthesia: General endotracheal anesthesia    Indications: Gurjit Valentin is a 83 y.o. male with a bladder tumor.  He presents today for surgical resection.      Findings:   1.  Small papillary bladder tumor left lateral wall near bladder neck resected. Excellent hemostasis at completion.   2.  CJ post-TURBT showed no fixed masses concerning for extravesical disease.     Estimated Blood Loss: min    Drains: none    Procedure in detail:  After the risks, benefits and possible complications of the procedure were explained, consents were obtained. The patient was taken to the operating room and placed under anesthesia. Pre-operative antibiotics were administered 30 minutes prior to expected start time. The patient was placed in the dorsal lithotomy position and prepped and draped in the normal and sterile fashion. Time out was performed.      A rigid cystoscope in a 22 Fr sheath was introduced into the bladder per urethra. This passed easily.  The entire urethra was visualized which showed no masses or strictures.  The right and left ureteral orifices were identified in the normal anatomic position and were seen effluxing clear urine.  Formal cystoscopy was performed which revealed a left lateral wall small papillary mass, grade 2 trabeculations. No bladder stones and no bladder diverticuli.      The resectoscope was then assembled with the visual obturator. This was placed into the bladder via the urethra and the visual obturator was exchanged for the resecting mechanism.  The tumor was then resected, superficially until the base was identified.  Specimens were then removed and passed off the field for pathologic  analysis.      The bladder was drained and hemostasis was achieved.  The resectoscope was removed.   Post-resection bimanual exam revealed no fixed masses concerning for extravesical disease.      The patient tolerated the procedure well and was transferred to recovery in stable condition.    Disposition:  The patient will follow up with Dr. Kyle in 3 weeks.  Prescriptions were given for oxycodone.      TESSA WELLER MD

## 2024-04-08 NOTE — BRIEF OP NOTE
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  Excellent hemostasis at completion.     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 Bladder tumor     References:    Click here for ordering Quick Tip   Question Answer Comment   Procedure Type: Urology    Which provider would you like to cc? JOSE WALTER    Release to patient Immediate        04/08/24 1249                      Discharge Note    OUTCOME: Patient tolerated treatment/procedure well without complication and is now ready for discharge.    DISPOSITION: Home or Self Care    FINAL DIAGNOSIS:  Bladder tumor    FOLLOWUP: In clinic    DISCHARGE INSTRUCTIONS:    Discharge Procedure Orders   Basic Metabolic Panel   Standing Status: Future Number of Occurrences: 1 Standing Exp. Date: 05/19/25     CBC Without Differential   Standing Status: Future Number of Occurrences: 1 Standing Exp. Date: 05/19/25     Diet Adult Regular      Lifting restrictions     Notify your health care provider if you experience any of the following:  temperature >100.4     Notify your health care provider if you experience any of the following:  persistent nausea and vomiting or diarrhea     Notify your health care provider if you experience any of the following:  severe uncontrolled pain     Notify your health care provider if you experience any of the following:  redness, tenderness, or signs of infection (pain, swelling, redness, odor or green/yellow discharge around incision site)     Notify your health care provider if you experience any of the following:  difficulty breathing or increased cough     Notify your health care provider if you experience any of the following:  severe persistent headache     Notify your health care provider if you experience any of the following:  worsening rash     Notify your health care provider if you experience any of the following:  persistent dizziness, light-headedness, or visual disturbances     Notify your health care provider if you experience any of the following:  increased confusion or weakness     Notify your health care provider if you experience any of the following:     EKG 12-lead   Standing Status: Future Number of Occurrences: 1 Standing Exp. Date: 03/20/25     Activity as tolerated

## 2024-04-08 NOTE — DISCHARGE INSTRUCTIONS
Post Cystoscopy and Transurethral resection of Bladder Tumor Instructions  Do not strain to have a bowel movement  No strenuous exercise x 7 days  No driving while you are on narcotic pain medications or if your costa  catheter is in place    You can expect:  To see blood in your urine.    Go to the ER if:  Your catheter stops draining  You are having severe abdominal pain  Inability to void if you do not have a catheter    Call the doctor if:  Temperature is greater than 101F  Persistent vomiting and inability to keep food down

## 2024-04-08 NOTE — H&P
U[facility] [place]    HPI:  Gurjit Valentin is a 83 y.o. male with bladder tumor.      ROS:  Neg except per HPI    Past Medical History:   Diagnosis Date    Allergy     Basal cell carcinoma     Basal cell carcinoma of right cheek 2023    R mid cheek    Bladder cancer     BPH with obstruction/lower urinary tract symptoms     Cataract     Chronic cough     Coronary artery disease     Ganglion cyst of wrist, left     Hyperlipidemia     Increased prostate specific antigen (PSA) velocity 10/15/2015    Insomnia     Kidney stone     Personal history of colonic polyps     Prediabetes     Skin cancer     Sleep apnea     CPAP    Squamous cell carcinoma of mandible 2024    right angle of mandible    Transient global amnesia     x2       Past Surgical History:   Procedure Laterality Date    BLADDER SURGERY      Ca excision    CARDIAC CATHETERIZATION      COLONOSCOPY N/A 2017    No further screening planned.    CYSTOSCOPY      EYE SURGERY Bilateral     Cataract    HERNIA REPAIR      KIDNEY STONE SURGERY      LITHOTRIPSY      TONSILLECTOMY      at age 4       Social History     Socioeconomic History    Marital status:      Spouse name: Teresa   Occupational History    Occupation:  [employer]     Employer: [employer]   Tobacco Use    Smoking status: Former     Current packs/day: 0.00     Average packs/day: 0.3 packs/day for 20.0 years (5.0 ttl pk-yrs)     Types: Cigarettes     Start date: 1968     Quit date: 1988     Years since quittin.2     Passive exposure: Never    Smokeless tobacco: Never    Tobacco comments:     STOPPED 30 YRS AGO.   Substance and Sexual Activity    Alcohol use: Yes     Alcohol/week: 2.0 - 4.0 standard drinks of alcohol     Types: 2 - 4 Glasses of wine per week    Drug use: No       Family History   Problem Relation Age of Onset    Hypertension Mother     Liver cancer Mother     No Known Problems Father     No Known Problems Sister      "Hypertension Brother     Heart attack Brother     No Known Problems Maternal Grandmother     No Known Problems Maternal Grandfather     No Known Problems Paternal Grandmother     No Known Problems Paternal Grandfather     No Known Problems Maternal Aunt     No Known Problems Maternal Uncle     No Known Problems Paternal Aunt     No Known Problems Paternal Uncle     Colon polyps Neg Hx     Cancer Neg Hx     Heart disease Neg Hx     Anemia Neg Hx     Arrhythmia Neg Hx     Asthma Neg Hx     Clotting disorder Neg Hx     Fainting Neg Hx     Heart failure Neg Hx     Hyperlipidemia Neg Hx        Review of patient's allergies indicates:  No Known Allergies    No current facility-administered medications on file prior to encounter.     Current Outpatient Medications on File Prior to Encounter   Medication Sig Dispense Refill    eszopiclone (LUNESTA) 3 mg Tab Take 1 tablet (3 mg total) by mouth nightly as needed (Insomnia). 30 tablet 2    pseudoephedrine (SUDAFED) 120 mg 12 hr tablet Take 120 mg by mouth as needed.      ramipriL (ALTACE) 5 MG capsule Take 1 capsule (5 mg total) by mouth 2 (two) times daily. (Patient taking differently: Take 10 mg by mouth once daily. 10mg once daily- PM) 180 capsule 3    rosuvastatin (CRESTOR) 20 MG tablet TAKE 1 TABLET(20 MG) BY MOUTH EVERY DAY 90 tablet 3    ascorbic acid, vitamin C, (VITAMIN C) 250 MG tablet Take 250 mg by mouth once daily.      fluticasone propionate (FLONASE) 50 mcg/actuation nasal spray 2 sprays (100 mcg total) by Each Nostril route once daily. 16 g 5    omega-3 fatty acids 300 mg Cap Take 300 mg by mouth.      promethazine-dextromethorphan (PROMETHAZINE-DM) 6.25-15 mg/5 mL Syrp Take 5 mLs by mouth every 8 (eight) hours as needed (Cough). 180 mL 0       Anticoagulation:  No    Physical Exam:  Estimated body mass index is 29.29 kg/m² as calculated from the following:    Height as of this encounter: 5' 11" (1.803 m).    Weight as of this encounter: 95.3 kg (210 " lb).    General: No acute distress, well developed. AAOx3  Head: Normocephalic, Atraumatic  Eyes: Extra-occular movements intact, No discharge  Neck: supple, symmetrical, trachea midline  Lungs: normal respiratory effort, no respiratory distress, no wheezes  CV: regular rate, 2+ pulses  Abdomen: soft, non-tender, non-distended, no organomegaly  MSK: no edema, no deformities, normal ROM  Skin: skin color, texture, turgor normal.  Neurologic: no focal deficits, sensation intact    Labs:    Urine dipstick today - Urine dipstick - negative for all components.    Lab Results   Component Value Date    WBC 8.51 03/27/2024    HGB 15.9 03/27/2024    HCT 48.5 03/27/2024    MCV 98 03/27/2024     03/27/2024           BMP  Lab Results   Component Value Date     03/27/2024    K 4.9 03/27/2024     03/27/2024    CO2 25 03/27/2024    BUN 16 03/27/2024    CREATININE 0.9 03/27/2024    CALCIUM 10.4 03/27/2024    ANIONGAP 8 03/27/2024    EGFRNORACEVR >60.0 03/27/2024         Assessment: Gurjit Valentin is a 83 y.o. male with bladder tumor    Plan:     1. To OR for TURBT.   2. Consents signed   3. I have explained the risk, benefits, and alternatives of the procedure in detail. The patient voices understanding and all questions have been answered. The patient agrees to proceed as planned.     Marielena Hawley MD

## 2024-04-08 NOTE — ANESTHESIA PROCEDURE NOTES
Intubation    Date/Time: 4/8/2024 12:23 PM    Performed by: Akila Lucas  Authorized by: Osman Washington Jr., MD    Intubation:     Induction:  Intravenous    Intubated:  Postinduction    Mask Ventilation:  Easy with oral airway    Attempts:  1    Attempted By:  Student    Method of Intubation:  Video laryngoscopy    Blade:  Nguyen 3    Laryngeal View Grade: Grade I - full view of cords      Difficult Airway Encountered?: No      Complications:  None    Airway Device:  Oral endotracheal tube    Airway Device Size:  7.5    Style/Cuff Inflation:  Cuffed (inflated to minimal occlusive pressure)    Tube secured:  22    Secured at:  The lips    Placement Verified By:  Capnometry    Complicating Factors:  None    Findings Post-Intubation:  BS equal bilateral and atraumatic/condition of teeth unchanged

## 2024-04-08 NOTE — PLAN OF CARE
Pre-op checklist complete. Pending H&P update and surgical consent. Vitals stable, no distress noted. Wife not present, call 20 mins before ready for discharge. Belongings will be placed in locker.

## 2024-04-08 NOTE — TRANSFER OF CARE
"Anesthesia Transfer of Care Note    Patient: Gurjit Valentin    Procedure(s) Performed: Procedure(s) (LRB):  TURBT (TRANSURETHRAL RESECTION OF BLADDER TUMOR) (N/A)  CYSTOSCOPY (N/A)    Patient location: PACU    Transport from OR: Transported from OR on room air with adequate spontaneous ventilation    Post pain: adequate analgesia    Post assessment: no apparent anesthetic complications and tolerated procedure well    Post vital signs: stable    Level of consciousness: awake    Nausea/Vomiting: no nausea/vomiting    Complications: none    Transfer of care protocol was followed      Last vitals: Visit Vitals  /65   Pulse (!) 56   Temp 36.6 °C (97.9 °F) (Temporal)   Resp 16   Ht 5' 11" (1.803 m)   Wt 95.3 kg (210 lb)   SpO2 95%   BMI 29.29 kg/m²     "

## 2024-04-09 ENCOUNTER — TELEPHONE (OUTPATIENT)
Dept: UROLOGY | Facility: CLINIC | Age: 84
End: 2024-04-09
Payer: MEDICARE

## 2024-04-09 NOTE — ANESTHESIA PREPROCEDURE EVALUATION
04/09/2024  Gurjit Valentin is a 83 y.o., male.      Pre-op Assessment    I have reviewed the Patient Summary Reports.     I have reviewed the Nursing Notes.       Review of Systems  Anesthesia Hx:  No problems with previous Anesthesia                Hematology/Oncology:  Hematology Normal   Oncology Normal                                   EENT/Dental:  EENT/Dental Normal           Cardiovascular:     Hypertension   CAD                                        Pulmonary:        Sleep Apnea                Renal/:  Chronic Renal Disease                Hepatic/GI:  Hepatic/GI Normal                 Musculoskeletal:  Arthritis               Neurological:    Neuromuscular Disease,                                   Endocrine:  Endocrine Normal            Dermatological:  Skin Normal    Psych:  Psychiatric Normal                    Physical Exam  General: Well nourished    Airway:  Mallampati: III / II  Mouth Opening: Normal  TM Distance: Normal  Tongue: Normal  Neck ROM: Normal ROM    Dental:  Intact        Anesthesia Plan  Type of Anesthesia, risks & benefits discussed:    Anesthesia Type: Gen Natural Airway, Gen ETT  Intra-op Monitoring Plan: Standard ASA Monitors  Post Op Pain Control Plan: multimodal analgesia  Induction:  IV  Airway Plan: Direct  Informed Consent: Informed consent signed with the Patient and all parties understand the risks and agree with anesthesia plan.  All questions answered. Patient consented to blood products? No  ASA Score: 3  Day of Surgery Review of History & Physical: H&P Update referred to the surgeon/provider.    Ready For Surgery From Anesthesia Perspective.     .

## 2024-04-09 NOTE — ANESTHESIA POSTPROCEDURE EVALUATION
Anesthesia Post Evaluation    Patient: Gurjit Valentin    Procedure(s) Performed: Procedure(s) (LRB):  TURBT (TRANSURETHRAL RESECTION OF BLADDER TUMOR) (N/A)  CYSTOSCOPY (N/A)    Final Anesthesia Type: general      Patient location during evaluation: PACU  Patient participation: Yes- Able to Participate  Level of consciousness: awake and alert  Post-procedure vital signs: reviewed and stable  Pain management: adequate  Airway patency: patent    PONV status at discharge: No PONV  Anesthetic complications: no      Cardiovascular status: blood pressure returned to baseline  Respiratory status: spontaneous ventilation and room air  Hydration status: euvolemic  Follow-up not needed.              Vitals Value Taken Time   /75 04/08/24 1345   Temp 36.6 °C (97.9 °F) 04/08/24 1300   Pulse 59 04/08/24 1345   Resp 16 04/08/24 1345   SpO2 97 % 04/08/24 1345         No case tracking events are documented in the log.      Pain/Marybel Score: Marybel Score: 10 (4/8/2024  1:30 PM)

## 2024-04-11 ENCOUNTER — TELEPHONE (OUTPATIENT)
Dept: UROLOGY | Facility: CLINIC | Age: 84
End: 2024-04-11
Payer: MEDICARE

## 2024-04-11 NOTE — TELEPHONE ENCOUNTER
I have called pt and asked about : Pain, Appetite, Ambulation and bleeding condition since procedure on   4/8/2024 , I answered all of his questions and concerns    DEEDEE BAUTISTA

## 2024-04-20 ENCOUNTER — TELEPHONE (OUTPATIENT)
Dept: INTERNAL MEDICINE | Facility: CLINIC | Age: 84
End: 2024-04-20
Payer: MEDICARE

## 2024-04-22 LAB
COMMENT: NORMAL
FINAL PATHOLOGIC DIAGNOSIS: NORMAL
GROSS: NORMAL
Lab: NORMAL
MICROSCOPIC EXAM: NORMAL

## 2024-04-29 NOTE — PROGRESS NOTES
Clinic Note  4/29/2024      Subjective:         Chief Complaint:   HPI  Gurjit Valentin is a 83 y.o. male with hsistory of LGTA bladder tumor years ago. Had recent TURBT (transurethral resection of bladder tumor) on 4/8/2024.  Path- LGTA.    Past Medical History:   Diagnosis Date    Allergy     Basal cell carcinoma     Basal cell carcinoma of right cheek 03/29/2023    R mid cheek    Bladder cancer     BPH with obstruction/lower urinary tract symptoms     Cataract     Chronic cough     Coronary artery disease     Ganglion cyst of wrist, left     Hyperlipidemia     Increased prostate specific antigen (PSA) velocity 10/15/2015    Insomnia     Kidney stone     Personal history of colonic polyps     Prediabetes     Skin cancer     Sleep apnea     CPAP    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    right angle of mandible    Transient global amnesia     x2     Family History   Problem Relation Name Age of Onset    Hypertension Mother      Liver cancer Mother      No Known Problems Father      No Known Problems Sister      Hypertension Brother      Heart attack Brother      No Known Problems Maternal Grandmother      No Known Problems Maternal Grandfather      No Known Problems Paternal Grandmother      No Known Problems Paternal Grandfather      No Known Problems Maternal Aunt      No Known Problems Maternal Uncle      No Known Problems Paternal Aunt      No Known Problems Paternal Uncle      Colon polyps Neg Hx      Cancer Neg Hx      Heart disease Neg Hx      Anemia Neg Hx      Arrhythmia Neg Hx      Asthma Neg Hx      Clotting disorder Neg Hx      Fainting Neg Hx      Heart failure Neg Hx      Hyperlipidemia Neg Hx       Social History     Socioeconomic History    Marital status:      Spouse name: Teresa   Occupational History    Occupation:  [employer]     Employer: [employer]   Tobacco Use    Smoking status: Former     Current packs/day: 0.00     Average packs/day: 0.3  packs/day for 20.0 years (5.0 ttl pk-yrs)     Types: Cigarettes     Start date: 1968     Quit date: 1988     Years since quittin.3     Passive exposure: Never    Smokeless tobacco: Never    Tobacco comments:     STOPPED 30 YRS AGO.   Substance and Sexual Activity    Alcohol use: Yes     Alcohol/week: 2.0 - 4.0 standard drinks of alcohol     Types: 2 - 4 Glasses of wine per week    Drug use: No     Social Determinants of Health     Financial Resource Strain: Low Risk  (2024)    Overall Financial Resource Strain (CARDIA)     Difficulty of Paying Living Expenses: Not hard at all   Food Insecurity: No Food Insecurity (2024)    Hunger Vital Sign     Worried About Running Out of Food in the Last Year: Never true     Ran Out of Food in the Last Year: Never true   Transportation Needs: No Transportation Needs (2024)    PRAPARE - Transportation     Lack of Transportation (Medical): No     Lack of Transportation (Non-Medical): No   Physical Activity: Insufficiently Active (2024)    Exercise Vital Sign     Days of Exercise per Week: 3 days     Minutes of Exercise per Session: 30 min   Stress: No Stress Concern Present (2024)    [employer] - Occupational Stress Questionnaire     Feeling of Stress : Only a little   Social Connections: Unknown (2024)    Social Connection and Isolation Panel [NHANES]     Frequency of Communication with Friends and Family: More than three times a week     Frequency of Social Gatherings with Friends and Family: Three times a week     Active Member of Clubs or Organizations: Yes     Attends Club or Organization Meetings: More than 4 times per year     Marital Status:    Housing Stability: Unknown (2024)    Housing Stability Vital Sign     Unable to Pay for Housing in the Last Year: No     Past Surgical History:   Procedure Laterality Date    BLADDER SURGERY      Ca excision    CARDIAC CATHETERIZATION      COLONOSCOPY  "N/A 12/04/2017    No further screening planned.    CYSTOSCOPY      CYSTOSCOPY N/A 4/8/2024    Procedure: CYSTOSCOPY;  Surgeon: Jose Kyle MD;  Location: [facility] OR [address] [place] [address];  Service: Urology;  Laterality: N/A;    EYE SURGERY Bilateral     Cataract    HERNIA REPAIR      KIDNEY STONE SURGERY      LITHOTRIPSY      TONSILLECTOMY      at age 4    TURBT (TRANSURETHRAL RESECTION OF BLADDER TUMOR) N/A 4/8/2024    Procedure: TURBT (TRANSURETHRAL RESECTION OF BLADDER TUMOR);  Surgeon: Jose Kyle MD;  Location: [facility] OR [address] [place] [address];  Service: Urology;  Laterality: N/A;     Patient Active Problem List   Diagnosis    Coronary artery disease: Known 50% LAD stenosis.    Insomnia    Back pain of thoracolumbar region    Hyperlipidemia: LDL at goal    Obese    DELVIN (obstructive sleep apnea)    Bladder cancer    AR (allergic rhinitis)    Recurrent nephrolithiasis    Meralgia paresthetica of left side    Dermatitis    Transient global amnesia    Vitreous hemorrhage, right    Posterior vitreous detachment, bilateral    Uveitis-hyphema-glaucoma syndrome of right eye    Hypertension    Chronic pain of left knee    Chondromalacia, patella, left    Gross hematuria    BPH (benign prostatic hyperplasia)    Prediabetes     Review of Systems      Objective:      There were no vitals taken for this visit.  Estimated body mass index is 29.29 kg/m² as calculated from the following:    Height as of 4/8/24: 5' 11" (1.803 m).    Weight as of 4/8/24: 95.3 kg (210 lb).  Physical Exam      Assessment and Plan:           Problem List Items Addressed This Visit       Bladder cancer - Primary       Follow up:   6 months with cystoscopy.  Jose Kyle        "

## 2024-04-30 ENCOUNTER — OFFICE VISIT (OUTPATIENT)
Dept: UROLOGY | Facility: CLINIC | Age: 84
End: 2024-04-30
Payer: MEDICARE

## 2024-04-30 VITALS
WEIGHT: 207 LBS | HEART RATE: 61 BPM | DIASTOLIC BLOOD PRESSURE: 82 MMHG | HEIGHT: 71 IN | SYSTOLIC BLOOD PRESSURE: 125 MMHG | BODY MASS INDEX: 28.98 KG/M2

## 2024-04-30 DIAGNOSIS — C67.2 MALIGNANT NEOPLASM OF LATERAL WALL OF URINARY BLADDER: Primary | ICD-10-CM

## 2024-04-30 PROCEDURE — 99214 OFFICE O/P EST MOD 30 MIN: CPT | Mod: S$PBB,,, | Performed by: UROLOGY

## 2024-04-30 PROCEDURE — 99999 PR PBB SHADOW E&M-EST. PATIENT-LVL III: CPT | Mod: PBBFAC,,, | Performed by: UROLOGY

## 2024-04-30 PROCEDURE — 99213 OFFICE O/P EST LOW 20 MIN: CPT | Mod: PBBFAC | Performed by: UROLOGY

## 2024-04-30 NOTE — LETTER
April 30, 2024        Huseyin Rice MD  [address] [facility] [address]             [facility] - Urology [address] [place] [id number]  Phone: [phone]   Patient: Gurjit Valentin   MR Number: [id number]   YOB: 1940   Date of Visit: 4/30/2024       Dear Dr. Rice:    Thank you for referring Gurjit Valentin to me for evaluation. Attached you will find relevant portions of my assessment and plan of care.    If you have questions, please do not hesitate to call me. I look forward to following Gurjit Valentin along with you.    Sincerely,      Jose Kyle MD            CC    No Recipients    Enclosure

## 2024-05-02 ENCOUNTER — TELEPHONE (OUTPATIENT)
Dept: DERMATOLOGY | Facility: CLINIC | Age: 84
End: 2024-05-02
Payer: MEDICARE

## 2024-05-02 NOTE — TELEPHONE ENCOUNTER
Dr. Collins informed us that the pt is cancelling his Mohs procedure on 5/7 for SCC L medial extensor forearm and will instead be using topicals on the area. Left message for pt to confirm this.

## 2024-05-02 NOTE — TELEPHONE ENCOUNTER
Spoke to pt. Will be cancelling his appt on 5/9 for SCC L medial extensor forearm and using topicals instead after seeing Dr. Collins.

## 2024-05-16 ENCOUNTER — PATIENT MESSAGE (OUTPATIENT)
Dept: RESEARCH | Facility: HOSPITAL | Age: 84
End: 2024-05-16
Payer: MEDICARE

## 2024-05-20 DIAGNOSIS — G47.00 INSOMNIA, UNSPECIFIED TYPE: ICD-10-CM

## 2024-05-20 RX ORDER — ESZOPICLONE 3 MG/1
3 TABLET, FILM COATED ORAL NIGHTLY PRN
Qty: 30 TABLET | Refills: 5 | Status: SHIPPED | OUTPATIENT
Start: 2024-05-20

## 2024-05-20 NOTE — TELEPHONE ENCOUNTER
Refill Routing Note   Medication(s) are not appropriate for processing by [facility] for the following reason(s):        Outside of protocol    [facility] action(s):  Route               Appointments  past 12m or future 3m with PCP    Date Provider   Last Visit   3/19/2024 Huseyin Rice MD   Next Visit   Visit date not found Huseyin Rice MD   ED visits in past 90 days: 0        Note composed:9:52 AM 05/20/2024

## 2024-05-20 NOTE — TELEPHONE ENCOUNTER
No care due was identified.  [facility] Embedded Care Due Messages. Reference number: [id number].   5/20/2024 9:31:32 AM CDT

## 2024-10-01 DIAGNOSIS — G47.00 INSOMNIA, UNSPECIFIED TYPE: ICD-10-CM

## 2024-10-01 RX ORDER — ESZOPICLONE 3 MG/1
3 TABLET, FILM COATED ORAL NIGHTLY
Qty: 30 TABLET | Refills: 2 | Status: SHIPPED | OUTPATIENT
Start: 2024-10-01

## 2024-10-01 NOTE — TELEPHONE ENCOUNTER
Refill Routing Note   Medication(s) are not appropriate for processing by [facility] for the following reason(s):        Outside of protocol    [facility] action(s):  Route     Requires labs : Yes             Appointments  past 12m or future 3m with PCP    Date Provider   Last Visit   Visit date not found Huseyin Rice MD   Next Visit   Visit date not found Huseyin Rice MD   ED visits in past 90 days: 0        Note composed:4:01 PM 10/01/2024

## 2024-10-01 NOTE — TELEPHONE ENCOUNTER
Care Due:                  Date            Visit Type   Department     Provider  --------------------------------------------------------------------------------                                             [facility]   INTERNAL  Last Visit: 03-      PATIENT      MEDICINE       Huseyin Rice  Next Visit: None Scheduled  None         None Found                                                            Last  Test          Frequency    Reason                     Performed    Due Date  --------------------------------------------------------------------------------    CMP.........  12 months..  rosuvastatin.............  12- 11-    Lipid Panel.  12 months..  rosuvastatin.............  12- 11-    [employer] Embedded Care Due Messages. Reference number: [id number].   10/01/2024 10:50:56 AM CDT

## 2024-10-03 DIAGNOSIS — E78.2 MIXED HYPERLIPIDEMIA: ICD-10-CM

## 2024-10-03 DIAGNOSIS — I25.10 CORONARY ARTERY DISEASE INVOLVING NATIVE CORONARY ARTERY OF NATIVE HEART WITHOUT ANGINA PECTORIS: Primary | ICD-10-CM

## 2024-10-10 ENCOUNTER — TELEPHONE (OUTPATIENT)
Dept: INTERNAL MEDICINE | Facility: CLINIC | Age: 84
End: 2024-10-10
Payer: MEDICARE

## 2024-10-10 DIAGNOSIS — N40.0 BENIGN PROSTATIC HYPERPLASIA, UNSPECIFIED WHETHER LOWER URINARY TRACT SYMPTOMS PRESENT: ICD-10-CM

## 2024-10-10 DIAGNOSIS — R73.03 PREDIABETES: ICD-10-CM

## 2024-10-10 DIAGNOSIS — E78.2 MIXED HYPERLIPIDEMIA: ICD-10-CM

## 2024-10-10 DIAGNOSIS — R76.8 ELEVATED SERUM IMMUNOGLOBULIN FREE LIGHT CHAINS: ICD-10-CM

## 2024-10-10 DIAGNOSIS — I25.10 CORONARY ARTERY DISEASE INVOLVING NATIVE CORONARY ARTERY OF NATIVE HEART WITHOUT ANGINA PECTORIS: Primary | ICD-10-CM

## 2024-10-10 DIAGNOSIS — R39.9 LOWER URINARY TRACT SYMPTOMS (LUTS): ICD-10-CM

## 2024-10-10 NOTE — TELEPHONE ENCOUNTER
Called and spoke to patient. All concerns and questions were addressed. Patient verbalized understanding.

## 2024-10-10 NOTE — TELEPHONE ENCOUNTER
----- Message from Huseyin Rice MD sent at 10/10/2024 10:17 AM CDT -----  I put those labs to wrap into his Dec lab draw for Cardiology. Let him know yes, okay to get covid and flu vaccines.    Thanks!  Krzysztof

## 2024-10-21 ENCOUNTER — TELEPHONE (OUTPATIENT)
Dept: INTERNAL MEDICINE | Facility: CLINIC | Age: 84
End: 2024-10-21
Payer: MEDICARE

## 2024-10-21 NOTE — TELEPHONE ENCOUNTER
Tried calling the patient. No answer and could not leave a voicemail for the patient to discuss his back pain x 10 days.

## 2024-10-22 ENCOUNTER — TELEPHONE (OUTPATIENT)
Dept: INTERNAL MEDICINE | Facility: CLINIC | Age: 84
End: 2024-10-22
Payer: MEDICARE

## 2024-10-22 DIAGNOSIS — M54.9 BACK PAIN, UNSPECIFIED BACK LOCATION, UNSPECIFIED BACK PAIN LATERALITY, UNSPECIFIED CHRONICITY: Primary | ICD-10-CM

## 2024-10-22 RX ORDER — CELECOXIB 100 MG/1
100 CAPSULE ORAL 2 TIMES DAILY PRN
Qty: 60 CAPSULE | Refills: 0 | Status: SHIPPED | OUTPATIENT
Start: 2024-10-22 | End: 2024-11-21

## 2024-10-22 NOTE — TELEPHONE ENCOUNTER
Recent upper back and arm pain. Only for past couple of weeks now. Notes hx of cervical djd. Agreed on continued Tylenol prn with addition of Celebrex bid prn short-term. Will continue to monitor progress for next few weeks. No overt trauma incident.   177.8

## 2024-11-27 DIAGNOSIS — M54.9 BACK PAIN, UNSPECIFIED BACK LOCATION, UNSPECIFIED BACK PAIN LATERALITY, UNSPECIFIED CHRONICITY: ICD-10-CM

## 2024-11-27 NOTE — TELEPHONE ENCOUNTER
No care due was identified.  [facility] Embedded Care Due Messages. Reference number: [id number].   11/27/2024 5:09:24 PM CST

## 2024-11-27 NOTE — TELEPHONE ENCOUNTER
Refill Routing Note   Medication(s) are not appropriate for processing by [facility] for the following reason(s):        Outside of protocol    [facility] action(s):  Route             Appointments  past 12m or future 3m with PCP    Date Provider   Last Visit   3/19/2024 Huseyin Rice MD   Next Visit   12/10/2024 Huseyin Rice MD   ED visits in past 90 days: 0        Note composed:5:40 PM 11/27/2024

## 2024-11-29 RX ORDER — CELECOXIB 100 MG/1
CAPSULE ORAL
Qty: 60 CAPSULE | Refills: 2 | Status: SHIPPED | OUTPATIENT
Start: 2024-11-29

## 2024-12-02 ENCOUNTER — TELEPHONE (OUTPATIENT)
Dept: UROLOGY | Facility: CLINIC | Age: 84
End: 2024-12-02
Payer: MEDICARE

## 2024-12-02 NOTE — TELEPHONE ENCOUNTER
LMOR for pt re: confirming procedure appt for tomorrow   LM with location & arrival time for 8:30.

## 2024-12-03 ENCOUNTER — PROCEDURE VISIT (OUTPATIENT)
Dept: UROLOGY | Facility: CLINIC | Age: 84
End: 2024-12-03
Payer: MEDICARE

## 2024-12-03 VITALS
BODY MASS INDEX: 30.33 KG/M2 | HEART RATE: 53 BPM | HEIGHT: 71 IN | SYSTOLIC BLOOD PRESSURE: 122 MMHG | RESPIRATION RATE: 18 BRPM | WEIGHT: 216.63 LBS | DIASTOLIC BLOOD PRESSURE: 71 MMHG | TEMPERATURE: 98 F

## 2024-12-03 DIAGNOSIS — C67.2 MALIGNANT NEOPLASM OF LATERAL WALL OF URINARY BLADDER: Primary | ICD-10-CM

## 2024-12-03 PROCEDURE — 52000 CYSTOURETHROSCOPY: CPT | Mod: PBBFAC | Performed by: UROLOGY

## 2024-12-03 RX ORDER — LIDOCAINE HYDROCHLORIDE 20 MG/ML
JELLY TOPICAL ONCE
Status: COMPLETED | OUTPATIENT
Start: 2024-12-03 | End: 2024-12-03

## 2024-12-03 RX ADMIN — LIDOCAINE HYDROCHLORIDE 5 ML: 20 JELLY TOPICAL at 08:12

## 2024-12-03 NOTE — PATIENT INSTRUCTIONS
What to Expect After a Cystoscopy  For the next 24-48 hours, you may feel a mild burning when you urinate. This burning is normal and expected. Drink plenty of water to dilute the urine to help relieve the burning sensation. You may also see a small amount of blood in your urine after the procedure.    Unless you are already taking antibiotics, you may be given an antibiotic after the test to prevent infection.    Signs and Symptoms to Report  Call the [facility] at [phone] if you develop any of the following:  Fever of 101 degrees or higher  Chills or persistent bleeding  Inability to urinate

## 2024-12-03 NOTE — PROCEDURES
Cystoscopy    Date/Time: 12/3/2024 9:00 AM    Performed by: Jose Kyle MD  Authorized by: Jose Kyle MD    Consent Done?:  Yes (Written)  Timeout: prior to procedure the correct patient, procedure, and site was verified    Prep: patient was prepped and draped in usual sterile fashion    Indications: history bladder cancer    Position:  Supine  Preparation: Patient was prepped and draped in usual sterile fashion    Scope type:  Flexible cystoscope  External exam normal: Yes    Digital exam performed: No    Urethra normal: Yes    Prostate normal: Yes    Bladder neck normal: Yes    Bladder normal: Yes     patient tolerated the procedure well with no immediate complications  Comments:      Cystoscopy in one year

## 2024-12-04 ENCOUNTER — LAB VISIT (OUTPATIENT)
Dept: LAB | Facility: HOSPITAL | Age: 84
End: 2024-12-04
Attending: INTERNAL MEDICINE
Payer: MEDICARE

## 2024-12-04 DIAGNOSIS — R39.9 LOWER URINARY TRACT SYMPTOMS (LUTS): ICD-10-CM

## 2024-12-04 DIAGNOSIS — R73.03 PREDIABETES: ICD-10-CM

## 2024-12-04 DIAGNOSIS — I25.10 CORONARY ARTERY DISEASE INVOLVING NATIVE CORONARY ARTERY OF NATIVE HEART WITHOUT ANGINA PECTORIS: ICD-10-CM

## 2024-12-04 DIAGNOSIS — N40.0 BENIGN PROSTATIC HYPERPLASIA, UNSPECIFIED WHETHER LOWER URINARY TRACT SYMPTOMS PRESENT: ICD-10-CM

## 2024-12-04 DIAGNOSIS — E78.2 MIXED HYPERLIPIDEMIA: ICD-10-CM

## 2024-12-04 DIAGNOSIS — R76.8 ELEVATED SERUM IMMUNOGLOBULIN FREE LIGHT CHAINS: ICD-10-CM

## 2024-12-04 LAB
ALBUMIN SERPL BCP-MCNC: 3.9 G/DL (ref 3.5–5.2)
ALP SERPL-CCNC: 35 U/L (ref 40–150)
ALT SERPL W/O P-5'-P-CCNC: 21 U/L (ref 10–44)
ANION GAP SERPL CALC-SCNC: 12 MMOL/L (ref 8–16)
ANION GAP SERPL CALC-SCNC: 12 MMOL/L (ref 8–16)
AST SERPL-CCNC: 22 U/L (ref 10–40)
BASOPHILS # BLD AUTO: 0.05 K/UL (ref 0–0.2)
BASOPHILS NFR BLD: 0.8 % (ref 0–1.9)
BILIRUB SERPL-MCNC: 0.4 MG/DL (ref 0.1–1)
BUN SERPL-MCNC: 17 MG/DL (ref 8–23)
BUN SERPL-MCNC: 17 MG/DL (ref 8–23)
CALCIUM SERPL-MCNC: 9.5 MG/DL (ref 8.7–10.5)
CALCIUM SERPL-MCNC: 9.5 MG/DL (ref 8.7–10.5)
CHLORIDE SERPL-SCNC: 107 MMOL/L (ref 95–110)
CHLORIDE SERPL-SCNC: 107 MMOL/L (ref 95–110)
CHOLEST SERPL-MCNC: 156 MG/DL (ref 120–199)
CHOLEST/HDLC SERPL: 2.2 {RATIO} (ref 2–5)
CO2 SERPL-SCNC: 20 MMOL/L (ref 23–29)
CO2 SERPL-SCNC: 20 MMOL/L (ref 23–29)
COMPLEXED PSA SERPL-MCNC: 2.2 NG/ML (ref 0–4)
CREAT SERPL-MCNC: 1 MG/DL (ref 0.5–1.4)
CREAT SERPL-MCNC: 1 MG/DL (ref 0.5–1.4)
DIFFERENTIAL METHOD BLD: ABNORMAL
EOSINOPHIL # BLD AUTO: 0.4 K/UL (ref 0–0.5)
EOSINOPHIL NFR BLD: 5.8 % (ref 0–8)
ERYTHROCYTE [DISTWIDTH] IN BLOOD BY AUTOMATED COUNT: 14 % (ref 11.5–14.5)
EST. GFR  (NO RACE VARIABLE): >60 ML/MIN/1.73 M^2
EST. GFR  (NO RACE VARIABLE): >60 ML/MIN/1.73 M^2
ESTIMATED AVG GLUCOSE: 131 MG/DL (ref 68–131)
GLUCOSE SERPL-MCNC: 123 MG/DL (ref 70–110)
GLUCOSE SERPL-MCNC: 123 MG/DL (ref 70–110)
HBA1C MFR BLD: 6.2 % (ref 4–5.6)
HCT VFR BLD AUTO: 44.4 % (ref 40–54)
HDLC SERPL-MCNC: 72 MG/DL (ref 40–75)
HDLC SERPL: 46.2 % (ref 20–50)
HGB BLD-MCNC: 15 G/DL (ref 14–18)
IMM GRANULOCYTES # BLD AUTO: 0.03 K/UL (ref 0–0.04)
IMM GRANULOCYTES NFR BLD AUTO: 0.5 % (ref 0–0.5)
LDLC SERPL CALC-MCNC: 73.2 MG/DL (ref 63–159)
LYMPHOCYTES # BLD AUTO: 1.3 K/UL (ref 1–4.8)
LYMPHOCYTES NFR BLD: 21.4 % (ref 18–48)
MCH RBC QN AUTO: 32.8 PG (ref 27–31)
MCHC RBC AUTO-ENTMCNC: 33.8 G/DL (ref 32–36)
MCV RBC AUTO: 97 FL (ref 82–98)
MONOCYTES # BLD AUTO: 0.8 K/UL (ref 0.3–1)
MONOCYTES NFR BLD: 12.5 % (ref 4–15)
NEUTROPHILS # BLD AUTO: 3.6 K/UL (ref 1.8–7.7)
NEUTROPHILS NFR BLD: 59 % (ref 38–73)
NONHDLC SERPL-MCNC: 84 MG/DL
NRBC BLD-RTO: 0 /100 WBC
PLATELET # BLD AUTO: 201 K/UL (ref 150–450)
PMV BLD AUTO: 11 FL (ref 9.2–12.9)
POTASSIUM SERPL-SCNC: 5 MMOL/L (ref 3.5–5.1)
POTASSIUM SERPL-SCNC: 5 MMOL/L (ref 3.5–5.1)
PROT SERPL-MCNC: 6.8 G/DL (ref 6–8.4)
RBC # BLD AUTO: 4.58 M/UL (ref 4.6–6.2)
SODIUM SERPL-SCNC: 139 MMOL/L (ref 136–145)
SODIUM SERPL-SCNC: 139 MMOL/L (ref 136–145)
TRIGL SERPL-MCNC: 54 MG/DL (ref 30–150)
TSH SERPL DL<=0.005 MIU/L-ACNC: 1.86 UIU/ML (ref 0.4–4)
WBC # BLD AUTO: 6.16 K/UL (ref 3.9–12.7)

## 2024-12-04 PROCEDURE — 84165 PROTEIN E-PHORESIS SERUM: CPT | Performed by: INTERNAL MEDICINE

## 2024-12-04 PROCEDURE — 84443 ASSAY THYROID STIM HORMONE: CPT | Performed by: INTERNAL MEDICINE

## 2024-12-04 PROCEDURE — 80061 LIPID PANEL: CPT | Performed by: INTERNAL MEDICINE

## 2024-12-04 PROCEDURE — 84165 PROTEIN E-PHORESIS SERUM: CPT | Mod: 26,,, | Performed by: PATHOLOGY

## 2024-12-04 PROCEDURE — 86334 IMMUNOFIX E-PHORESIS SERUM: CPT | Mod: 26,,, | Performed by: PATHOLOGY

## 2024-12-04 PROCEDURE — 80053 COMPREHEN METABOLIC PANEL: CPT | Performed by: INTERNAL MEDICINE

## 2024-12-04 PROCEDURE — 85025 COMPLETE CBC W/AUTO DIFF WBC: CPT | Performed by: INTERNAL MEDICINE

## 2024-12-04 PROCEDURE — 84153 ASSAY OF PSA TOTAL: CPT | Performed by: INTERNAL MEDICINE

## 2024-12-04 PROCEDURE — 83036 HEMOGLOBIN GLYCOSYLATED A1C: CPT | Performed by: INTERNAL MEDICINE

## 2024-12-04 PROCEDURE — 83521 IG LIGHT CHAINS FREE EACH: CPT | Mod: 59 | Performed by: INTERNAL MEDICINE

## 2024-12-04 PROCEDURE — 36415 COLL VENOUS BLD VENIPUNCTURE: CPT | Mod: PO | Performed by: INTERNAL MEDICINE

## 2024-12-04 PROCEDURE — 86334 IMMUNOFIX E-PHORESIS SERUM: CPT | Performed by: INTERNAL MEDICINE

## 2024-12-05 PROBLEM — I70.0 AORTIC ATHEROSCLEROSIS: Status: ACTIVE | Noted: 2024-12-05

## 2024-12-05 LAB
ALBUMIN SERPL ELPH-MCNC: 4.19 G/DL (ref 3.35–5.55)
ALPHA1 GLOB SERPL ELPH-MCNC: 0.25 G/DL (ref 0.17–0.41)
ALPHA2 GLOB SERPL ELPH-MCNC: 0.62 G/DL (ref 0.43–0.99)
B-GLOBULIN SERPL ELPH-MCNC: 0.74 G/DL (ref 0.5–1.1)
GAMMA GLOB SERPL ELPH-MCNC: 0.9 G/DL (ref 0.67–1.58)
INTERPRETATION SERPL IFE-IMP: NORMAL
KAPPA LC SER QL IA: 2.65 MG/DL (ref 0.33–1.94)
KAPPA LC/LAMBDA SER IA: 1.29 (ref 0.26–1.65)
LAMBDA LC SER QL IA: 2.06 MG/DL (ref 0.57–2.63)
PROT SERPL-MCNC: 6.7 G/DL (ref 6–8.4)

## 2024-12-05 NOTE — PROGRESS NOTES
Subjective:   Patient ID:  Gurjit Valentin is a 84 y.o. male who presents for follow up of Coronary Artery Disease and Hyperlipidemia      Assessment:     1. CAD without angina: Known 50% LAD stenosis: nl EF, non-obst CAD by cath.    2. Aortic atherosclerosis    3. Class 2 severe obesity due to excess calories with serious comorbidity in adult, unspecified BMI    4. Mixed hyperlipidemia: LDL not at goal on meds (73)    5. Primary hypertension: well controlled below goal on meds    6. DELVIN (obstructive sleep apnea)        Plan:     Meat control and repeat FLP in 2 months. Consider increase Crestor to 40 or add Ezetimibe if not at goal.  RTC 1 yr. with FLP and BMP    HPI: Francisco is feeling well and walking/exercising regularly without angina or heart failure symptoms. We discussed improving his diet by avoiding meat protein.     6- Stress Echo  The left ventricle is normal in size with normal systolic function. The estimated ejection fraction is 60%.  Normal right ventricular size with normal right ventricular systolic function.  Indeterminate left ventricular diastolic function.  Mild left atrial enlargement.  The estimated PA systolic pressure is 29 mmHg.  Normal central venous pressure (3 mmHg).  The patient's exercise capacity was normal.  The ECG portion of this study is abnormal but not diagnostic for ischemia.  The stress echo portion of this study is negative for myocardial ischemia.  Overall, this study is negative for myocardial ischemia.        10- TTE  The left ventricle is normal in size with normal systolic function. The estimated ejection fraction is 60%.  Normal right ventricular size with normal right ventricular systolic function.  Indeterminate left ventricular diastolic function.  Mild left atrial enlargement.  The estimated PA systolic pressure is 28 mmHg.  Normal central venous pressure (3 mmHg).      TTE  The left ventricle is normal in size with normal systolic function. The  estimated ejection fraction is 60%.  Normal right ventricular systolic function.  Normal left ventricular diastolic function.      Echo Stress  Normal left ventricular systolic function. The estimated ejection fraction is 65%  Normal LV diastolic function.  No wall motion abnormalities.  Normal right ventricular systolic function.  The estimated PA systolic pressure is 31 mm Hg  Normal central venous pressure (3 mm Hg).  The stress echo portion of this study is negative for myocardial ischemia. Target heart rate was achieved.  The EKG portion of this study is positive for myocardial ischemia.  Although the EKG response to exercise was positive, there was above average exercise capacity and no angina elicited. This is unchanged from prior studies.  Arrhythmias during stress: rare PVCs.  The patient's exercise capacity was above average. Achieved 12 METs.  The test was stopped because the patient experienced shortness of breath.    Review of Systems   Cardiovascular:  Negative for chest pain, claudication, cyanosis, dyspnea on exertion, irregular heartbeat, leg swelling, near-syncope, orthopnea, palpitations, paroxysmal nocturnal dyspnea and syncope.   Respiratory:  Negative for shortness of breath.    Neurological:  Negative for brief paralysis, dizziness and focal weakness.       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    CYSTOSCOPY      CYSTOSCOPY N/A 2024    Procedure: CYSTOSCOPY;  Surgeon: Jose Kyle MD;  Location: [address];  Service: Urology;  Laterality: N/A;    EYE SURGERY Bilateral     Cataract    HERNIA REPAIR      KIDNEY STONE SURGERY      LITHOTRIPSY      TONSILLECTOMY      at age 4    TURBT (TRANSURETHRAL RESECTION OF BLADDER TUMOR) N/A 2024    Procedure: TURBT (TRANSURETHRAL RESECTION OF BLADDER TUMOR);  Surgeon: Jose Kyle MD;  Location: [facility] OR [address] [place] [address];  Service: Urology;  Laterality: N/A;       Social History     Tobacco Use    Smoking status: Former     Current packs/day: 0.00     Average packs/day: 0.3 packs/day for 20.0 years (5.0 ttl pk-yrs)     Types: Cigarettes     Start date: 1968     Quit date: 1988     Years since quittin.9     Passive exposure: Never    Smokeless tobacco: Never    Tobacco comments:     STOPPED 30 YRS AGO.   Substance Use Topics    Alcohol use: Yes     Alcohol/week: 2.0 - 4.0 standard drinks of alcohol     Types: 2 - 4 Glasses of wine per week    Drug use: No       Family History   Problem Relation Name Age of Onset    Hypertension Mother      Liver cancer Mother      No Known Problems Father      No Known Problems Sister      Hypertension Brother      Heart attack Brother      No Known Problems Maternal Grandmother      No Known Problems Maternal Grandfather      No Known Problems Paternal Grandmother      No Known Problems Paternal Grandfather      No Known Problems Maternal Aunt      No Known Problems Maternal Uncle      No Known Problems Paternal Aunt      No Known Problems Paternal Uncle      Colon polyps Neg Hx      Cancer Neg Hx      Heart disease Neg Hx      Anemia Neg Hx      Arrhythmia Neg Hx      Asthma Neg Hx      Clotting disorder Neg Hx      Fainting Neg Hx    "   Heart failure Neg Hx      Hyperlipidemia Neg Hx         Current Outpatient Medications   Medication Sig    ascorbic acid, vitamin C, (VITAMIN C) 250 MG tablet Take 250 mg by mouth once daily.    celecoxib (CELEBREX) 100 MG capsule TAKE 1 CAPSULE(100 MG) BY MOUTH TWICE DAILY AS NEEDED FOR PAIN    eszopiclone (LUNESTA) 3 mg Tab TAKE 1 TABLET BY MOUTH EVERY DAY AT BEDTIME    melatonin 1 mg Chew Take by mouth.    omega-3 fatty acids 300 mg Cap Take 300 mg by mouth.    pseudoephedrine (SUDAFED) 120 mg 12 hr tablet Take 120 mg by mouth as needed.    ramipriL (ALTACE) 5 MG capsule Take 1 capsule (5 mg total) by mouth 2 (two) times daily. (Patient taking differently: Take 10 mg by mouth once daily. 10mg once daily- PM)    rosuvastatin (CRESTOR) 20 MG tablet TAKE 1 TABLET(20 MG) BY MOUTH EVERY DAY    fluticasone propionate (FLONASE) 50 mcg/actuation nasal spray 2 sprays (100 mcg total) by Each Nostril route once daily. (Patient not taking: Reported on 12/10/2024)    oxyCODONE (ROXICODONE) 5 MG immediate release tablet Take 1 tablet (5 mg total) by mouth every 4 (four) hours as needed for Pain. (Patient not taking: Reported on 12/10/2024)    promethazine-dextromethorphan (PROMETHAZINE-DM) 6.25-15 mg/5 mL Syrp Take 5 mLs by mouth every 8 (eight) hours as needed (Cough). (Patient not taking: Reported on 12/10/2024)     No current facility-administered medications for this visit.       Review of patient's allergies indicates:  No Known Allergies    Objective:     /70 (BP Location: Right arm, Patient Position: Sitting)   Pulse (!) 55   Ht 5' 11" (1.803 m)   Wt 97.7 kg (215 lb 6.2 oz)   SpO2 98%   BMI 30.04 kg/m²     Physical Exam  Vitals and nursing note reviewed.   Constitutional:       Appearance: He is well-developed.   HENT:      Head: Normocephalic and atraumatic.   Eyes:      Conjunctiva/sclera: Conjunctivae normal.      Pupils: Pupils are equal, round, and reactive to light.   Neck:      Thyroid: No " thyromegaly.   Cardiovascular:      Rate and Rhythm: Normal rate and regular rhythm.      Pulses: Normal pulses.      Heart sounds: Normal heart sounds. No murmur heard.     No friction rub. No gallop.   Pulmonary:      Effort: Pulmonary effort is normal. No respiratory distress.      Breath sounds: Normal breath sounds. No wheezing or rales.   Chest:      Chest wall: No tenderness.   Abdominal:      General: Bowel sounds are normal. There is no distension.      Palpations: Abdomen is soft.      Tenderness: There is no abdominal tenderness.   Musculoskeletal:         General: Normal range of motion.      Cervical back: Normal range of motion and neck supple.   Skin:     General: Skin is warm and dry.   Neurological:      Mental Status: He is alert and oriented to person, place, and time.      Cranial Nerves: No cranial nerve deficit.      Coordination: Coordination normal.      Deep Tendon Reflexes: Reflexes are normal and symmetric.   Psychiatric:         Behavior: Behavior normal.         Thought Content: Thought content normal.         Judgment: Judgment normal.           Chemistry        Component Value Date/Time     12/04/2024 0735     12/04/2024 0735    K 5.0 12/04/2024 0735    K 5.0 12/04/2024 0735     12/04/2024 0735     12/04/2024 0735    CO2 20 (L) 12/04/2024 0735    CO2 20 (L) 12/04/2024 0735    BUN 17 12/04/2024 0735    BUN 17 12/04/2024 0735    CREATININE 1.0 12/04/2024 0735    CREATININE 1.0 12/04/2024 0735     (H) 12/04/2024 0735     (H) 12/04/2024 0735        Component Value Date/Time    CALCIUM 9.5 12/04/2024 0735    CALCIUM 9.5 12/04/2024 0735    ALKPHOS 35 (L) 12/04/2024 0735    AST 22 12/04/2024 0735    ALT 21 12/04/2024 0735    BILITOT 0.4 12/04/2024 0735    ESTGFRAFRICA >60.0 02/02/2022 0721    EGFRNONAA >60.0 02/02/2022 0721            Lab Results   Component Value Date    CHOL 156 12/04/2024    CHOL 159 12/04/2023    CHOL 146 11/04/2022    CHOL 146  11/04/2022     Lab Results   Component Value Date    HDL 72 12/04/2024    HDL 65 12/04/2023    HDL 66 11/04/2022    HDL 66 11/04/2022     Lab Results   Component Value Date    LDLCALC 73.2 12/04/2024    LDLCALC 77.6 12/04/2023    LDLCALC 66.4 11/04/2022    LDLCALC 66.4 11/04/2022     Lab Results   Component Value Date    TRIG 54 12/04/2024    TRIG 82 12/04/2023    TRIG 68 11/04/2022    TRIG 68 11/04/2022     Lab Results   Component Value Date    CHOLHDL 46.2 12/04/2024    CHOLHDL 40.9 12/04/2023    CHOLHDL 45.2 11/04/2022    CHOLHDL 45.2 11/04/2022         Lab Results   Component Value Date     12/04/2024     12/04/2024    K 5.0 12/04/2024    K 5.0 12/04/2024     12/04/2024     12/04/2024    CO2 20 (L) 12/04/2024    CO2 20 (L) 12/04/2024    BUN 17 12/04/2024    BUN 17 12/04/2024    CREATININE 1.0 12/04/2024    CREATININE 1.0 12/04/2024     (H) 12/04/2024     (H) 12/04/2024    HGBA1C 6.2 (H) 12/04/2024    AST 22 12/04/2024    ALT 21 12/04/2024    ALBUMIN 3.9 12/04/2024    PROT 6.8 12/04/2024    BILITOT 0.4 12/04/2024    WBC 6.16 12/04/2024    HGB 15.0 12/04/2024    HCT 44.4 12/04/2024    MCV 97 12/04/2024     12/04/2024    INR 1.0 06/01/2010    PSA 1.8 11/09/2022    TSH 1.862 12/04/2024    CHOL 156 12/04/2024    HDL 72 12/04/2024    LDLCALC 73.2 12/04/2024    TRIG 54 12/04/2024

## 2024-12-08 LAB
PATHOLOGIST INTERPRETATION IFE: NORMAL
PATHOLOGIST INTERPRETATION SPE: NORMAL

## 2024-12-10 ENCOUNTER — OFFICE VISIT (OUTPATIENT)
Dept: CARDIOLOGY | Facility: CLINIC | Age: 84
End: 2024-12-10
Payer: MEDICARE

## 2024-12-10 ENCOUNTER — OFFICE VISIT (OUTPATIENT)
Dept: INTERNAL MEDICINE | Facility: CLINIC | Age: 84
End: 2024-12-10
Payer: MEDICARE

## 2024-12-10 VITALS
SYSTOLIC BLOOD PRESSURE: 120 MMHG | DIASTOLIC BLOOD PRESSURE: 70 MMHG | WEIGHT: 215.38 LBS | HEART RATE: 55 BPM | OXYGEN SATURATION: 98 % | HEIGHT: 71 IN | BODY MASS INDEX: 30.15 KG/M2

## 2024-12-10 VITALS
DIASTOLIC BLOOD PRESSURE: 76 MMHG | HEIGHT: 71 IN | WEIGHT: 216.38 LBS | BODY MASS INDEX: 30.29 KG/M2 | HEART RATE: 52 BPM | SYSTOLIC BLOOD PRESSURE: 115 MMHG

## 2024-12-10 DIAGNOSIS — E78.2 MIXED HYPERLIPIDEMIA: ICD-10-CM

## 2024-12-10 DIAGNOSIS — I25.10 CORONARY ARTERY DISEASE INVOLVING NATIVE CORONARY ARTERY OF NATIVE HEART WITHOUT ANGINA PECTORIS: Primary | ICD-10-CM

## 2024-12-10 DIAGNOSIS — R76.8 ELEVATED SERUM IMMUNOGLOBULIN FREE LIGHT CHAINS: ICD-10-CM

## 2024-12-10 DIAGNOSIS — I10 PRIMARY HYPERTENSION: ICD-10-CM

## 2024-12-10 DIAGNOSIS — I70.0 AORTIC ATHEROSCLEROSIS: ICD-10-CM

## 2024-12-10 DIAGNOSIS — E66.01 CLASS 2 SEVERE OBESITY DUE TO EXCESS CALORIES WITH SERIOUS COMORBIDITY IN ADULT, UNSPECIFIED BMI: ICD-10-CM

## 2024-12-10 DIAGNOSIS — I25.10 CORONARY ARTERY DISEASE INVOLVING NATIVE CORONARY ARTERY OF NATIVE HEART WITHOUT ANGINA PECTORIS: ICD-10-CM

## 2024-12-10 DIAGNOSIS — G47.00 INSOMNIA, UNSPECIFIED TYPE: ICD-10-CM

## 2024-12-10 DIAGNOSIS — R73.02 IGT (IMPAIRED GLUCOSE TOLERANCE): ICD-10-CM

## 2024-12-10 DIAGNOSIS — E78.2 MIXED HYPERLIPIDEMIA: Primary | ICD-10-CM

## 2024-12-10 DIAGNOSIS — Z00.00 ENCOUNTER FOR ANNUAL HEALTH EXAMINATION: Primary | ICD-10-CM

## 2024-12-10 DIAGNOSIS — G47.33 OSA (OBSTRUCTIVE SLEEP APNEA): ICD-10-CM

## 2024-12-10 DIAGNOSIS — E66.812 CLASS 2 SEVERE OBESITY DUE TO EXCESS CALORIES WITH SERIOUS COMORBIDITY IN ADULT, UNSPECIFIED BMI: ICD-10-CM

## 2024-12-10 DIAGNOSIS — D89.2 PARAPROTEINEMIA: ICD-10-CM

## 2024-12-10 DIAGNOSIS — R39.9 LOWER URINARY TRACT SYMPTOMS (LUTS): ICD-10-CM

## 2024-12-10 DIAGNOSIS — N20.0 RECURRENT NEPHROLITHIASIS: Chronic | ICD-10-CM

## 2024-12-10 DIAGNOSIS — R73.03 PREDIABETES: ICD-10-CM

## 2024-12-10 DIAGNOSIS — H61.21 IMPACTED CERUMEN OF RIGHT EAR: ICD-10-CM

## 2024-12-10 PROCEDURE — 99214 OFFICE O/P EST MOD 30 MIN: CPT | Mod: S$PBB,,, | Performed by: INTERNAL MEDICINE

## 2024-12-10 PROCEDURE — 99213 OFFICE O/P EST LOW 20 MIN: CPT | Mod: PBBFAC,27 | Performed by: INTERNAL MEDICINE

## 2024-12-10 PROCEDURE — 99213 OFFICE O/P EST LOW 20 MIN: CPT | Mod: S$PBB,,, | Performed by: INTERNAL MEDICINE

## 2024-12-10 PROCEDURE — 99999 PR PBB SHADOW E&M-EST. PATIENT-LVL III: CPT | Mod: PBBFAC,,, | Performed by: INTERNAL MEDICINE

## 2024-12-10 PROCEDURE — 99213 OFFICE O/P EST LOW 20 MIN: CPT | Mod: PBBFAC | Performed by: INTERNAL MEDICINE

## 2024-12-10 RX ORDER — MELATONIN 1 MG
TABLET,CHEWABLE ORAL
COMMUNITY

## 2024-12-10 RX ORDER — ESZOPICLONE 3 MG/1
3 TABLET, FILM COATED ORAL NIGHTLY PRN
Qty: 30 TABLET | Refills: 5 | Status: SHIPPED | OUTPATIENT
Start: 2024-12-10

## 2024-12-10 NOTE — PROGRESS NOTES
Subjective:       Patient ID: Gurjit Valentin is a 84 y.o. male.    Chief Complaint: Annual Exam      HPI  Annual health exam. Reviewed medical, surgical, social and family history, medications, appropriate preventive health screenings, as well as vaccination history. Updates as noted below or in assessment and plan.    Weight up recently and notes less excise of late. Is walking couple times per week. Few alcohol drinks most nights. Diet up and down. Notes right lateral elbow pains ongoing for a while now.   Taking Lunesta 3 mg nightly. Was unable to wean in past. Takes 10 mg melatonin as well. Using his cpap nightly.  Chronic hearing loss stable. Doesn't feel time for hearing aids.  1x nocturia stable.    Review of Systems   All other systems reviewed and are negative.      Past Medical History:   Diagnosis Date    Allergy     Basal cell carcinoma     Basal cell carcinoma of right cheek 03/29/2023    R mid cheek    Bladder cancer     BPH with obstruction/lower urinary tract symptoms     Cataract     Chronic cough     Coronary artery disease     Ganglion cyst of wrist, left     Hyperlipidemia     Increased prostate specific antigen (PSA) velocity 10/15/2015    Insomnia     Kidney stone     Personal history of colonic polyps     Prediabetes     Skin cancer     Sleep apnea     CPAP    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    right angle of mandible    Transient global amnesia     x2         Current Outpatient Medications:     ascorbic acid, vitamin C, (VITAMIN C) 250 MG tablet, Take 250 mg by mouth once daily., Disp: , Rfl:     celecoxib (CELEBREX) 100 MG capsule, TAKE 1 CAPSULE(100 MG) BY MOUTH TWICE DAILY AS NEEDED FOR PAIN, Disp: 60 capsule, Rfl: 2    eszopiclone (LUNESTA) 3 mg Tab, Take 1 tablet (3 mg total) by mouth nightly as needed (Insomnia)., Disp: 30 tablet, Rfl: 5    melatonin 1 mg Chew, Take by mouth., Disp: , Rfl:     omega-3 fatty acids 300 mg Cap, Take 300 mg by mouth., Disp: , Rfl:      ramipriL (ALTACE) 5 MG capsule, Take 1 capsule (5 mg total) by mouth 2 (two) times daily. (Patient taking differently: Take 10 mg by mouth once daily. 10mg once daily- PM), Disp: 180 capsule, Rfl: 3    rosuvastatin (CRESTOR) 20 MG tablet, TAKE 1 TABLET(20 MG) BY MOUTH EVERY DAY, Disp: 90 tablet, Rfl: 3    Past Surgical History:   Procedure Laterality Date    BLADDER SURGERY      Ca excision    CARDIAC CATHETERIZATION      COLONOSCOPY N/A 12/04/2017    No further screening planned.    CYSTOSCOPY      CYSTOSCOPY N/A 4/8/2024    Procedure: CYSTOSCOPY;  Surgeon: Jose Kyle MD;  Location: [facility] OR [address] [place] [address];  Service: Urology;  Laterality: N/A;    EYE SURGERY Bilateral     Cataract    HERNIA REPAIR      KIDNEY STONE SURGERY      LITHOTRIPSY      TONSILLECTOMY      at age 4    TURBT (TRANSURETHRAL RESECTION OF BLADDER TUMOR) N/A 4/8/2024    Procedure: TURBT (TRANSURETHRAL RESECTION OF BLADDER TUMOR);  Surgeon: Jose Kyle MD;  Location: [facility] OR [address] [place] [address];  Service: Urology;  Laterality: N/A;       Family History   Problem Relation Name Age of Onset    Hypertension Mother      Liver cancer Mother      No Known Problems Father      No Known Problems Sister      Hypertension Brother      Heart attack Brother      No Known Problems Maternal Grandmother      No Known Problems Maternal Grandfather      No Known Problems Paternal Grandmother      No Known Problems Paternal Grandfather      No Known Problems Maternal Aunt      No Known Problems Maternal Uncle      No Known Problems Paternal Aunt      No Known Problems Paternal Uncle      Colon polyps Neg Hx      Cancer Neg Hx      Heart disease Neg Hx      Anemia Neg Hx      Arrhythmia Neg Hx      Asthma Neg Hx      Clotting disorder Neg Hx      Fainting Neg Hx      Heart failure Neg Hx      Hyperlipidemia Neg Hx         Social History     Tobacco Use    Smoking status: Former     Current packs/day: 0.00     Average packs/day: 0.3 packs/day for 20.0 years (5.0 ttl  pk-yrs)     Types: Cigarettes     Start date: 1968     Quit date: 1988     Years since quittin.9     Passive exposure: Never    Smokeless tobacco: Never    Tobacco comments:     STOPPED 30 YRS AGO.   Substance Use Topics    Alcohol use: Not Currently     Comment: 2 to 3 drinks most nights.    Drug use: No       Immunization History   Administered Date(s) Administered    COVID-19, MRNA, LN-S, PF (Pfizer) (Gray Cap) 2022    COVID-19, MRNA, LN-S, PF (Pfizer) (Purple Cap) 2021, 2021, 2021    COVID-19, mRNA, LNP-S, bivalent booster, PF (PFIZER OMICRON) 10/06/2022    Influenza - Trivalent - Afluria, Fluzone MDV 10/10/2007, 2009, 2010, 10/31/2011    Influenza - Trivalent - Fluad - Adjuvanted - PF (65 years and older 09/15/2017, 10/06/2018    Influenza - Trivalent - Fluarix, Flulaval, Fluzone, Afluria - PF 2020    Influenza - Trivalent - Fluzone High Dose - PF (65 years and older) 10/06/2014, 2015, 2016, 2017, 09/10/2019, 10/21/2021, 10/06/2022, 10/10/2024    Influenza Split 10/01/2015    Pneumococcal Conjugate - 13 Valent 10/06/2014, 10/15/2015    Pneumococcal Polysaccharide - 23 Valent 2019    Tdap 10/06/2014    Zoster 10/06/2014    Zoster Recombinant 2020         Objective:      Vitals:    12/10/24 1021   BP: 115/76   Pulse: (!) 52       Physical Exam  Constitutional:       General: He is not in acute distress.     Appearance: Normal appearance. He is well-developed. He is not ill-appearing.   HENT:      Head: Normocephalic and atraumatic.      Right Ear: Hearing normal. There is impacted cerumen.      Left Ear: Hearing and tympanic membrane normal. There is no impacted cerumen.      Nose: Nose normal.      Mouth/Throat:      Mouth: Mucous membranes are moist.      Pharynx: Oropharynx is clear.   Eyes:      Extraocular Movements: Extraocular movements intact.      Conjunctiva/sclera: Conjunctivae normal.      Pupils: Pupils are equal,  round, and reactive to light.   Neck:      Vascular: No carotid bruit.   Cardiovascular:      Rate and Rhythm: Normal rate and regular rhythm.      Heart sounds: Normal heart sounds. No murmur heard.  Pulmonary:      Effort: Pulmonary effort is normal. No respiratory distress.      Breath sounds: Normal breath sounds. No wheezing, rhonchi or rales.   Abdominal:      General: Abdomen is flat. There is no distension.      Palpations: Abdomen is soft. There is no mass.      Tenderness: There is no abdominal tenderness.      Hernia: No hernia is present.   Musculoskeletal:         General: Tenderness (Right lateral epicondyle.) present. No swelling or deformity. Normal range of motion.      Cervical back: No tenderness.      Right lower leg: No edema.      Left lower leg: No edema.   Lymphadenopathy:      Cervical: No cervical adenopathy.   Skin:     General: Skin is warm and dry.      Findings: No lesion or rash.   Neurological:      General: No focal deficit present.      Mental Status: He is alert and oriented to person, place, and time.      Cranial Nerves: No cranial nerve deficit.      Coordination: Coordination normal.      Gait: Gait normal.      Deep Tendon Reflexes: Reflexes normal.   Psychiatric:         Mood and Affect: Mood normal.         Behavior: Behavior normal.         Thought Content: Thought content normal.         Judgment: Judgment normal.         Recent Results (from the past 12 weeks)   Basic Metabolic Panel    Collection Time: 12/04/24  7:35 AM   Result Value Ref Range    Sodium 139 136 - 145 mmol/L    Potassium 5.0 3.5 - 5.1 mmol/L    Chloride 107 95 - 110 mmol/L    CO2 20 (L) 23 - 29 mmol/L    Glucose 123 (H) 70 - 110 mg/dL    BUN 17 8 - 23 mg/dL    Creatinine 1.0 0.5 - 1.4 mg/dL    Calcium 9.5 8.7 - 10.5 mg/dL    Anion Gap 12 8 - 16 mmol/L    eGFR >60.0 >60 mL/min/1.73 m^2   Lipid Panel    Collection Time: 12/04/24  7:35 AM   Result Value Ref Range    Cholesterol 156 120 - 199 mg/dL     Triglycerides 54 30 - 150 mg/dL    HDL 72 40 - 75 mg/dL    LDL Cholesterol 73.2 63.0 - 159.0 mg/dL    HDL/Cholesterol Ratio 46.2 20.0 - 50.0 %    Total Cholesterol/HDL Ratio 2.2 2.0 - 5.0    Non-HDL Cholesterol 84 mg/dL   CBC W/ AUTO DIFFERENTIAL    Collection Time: 12/04/24  7:35 AM   Result Value Ref Range    WBC 6.16 3.90 - 12.70 K/uL    RBC 4.58 (L) 4.60 - 6.20 M/uL    Hemoglobin 15.0 14.0 - 18.0 g/dL    Hematocrit 44.4 40.0 - 54.0 %    MCV 97 82 - 98 fL    MCH 32.8 (H) 27.0 - 31.0 pg    MCHC 33.8 32.0 - 36.0 g/dL    RDW 14.0 11.5 - 14.5 %    Platelets 201 150 - 450 K/uL    MPV 11.0 9.2 - 12.9 fL    Immature Granulocytes 0.5 0.0 - 0.5 %    Gran # (ANC) 3.6 1.8 - 7.7 K/uL    Immature Grans (Abs) 0.03 0.00 - 0.04 K/uL    Lymph # 1.3 1.0 - 4.8 K/uL    Mono # 0.8 0.3 - 1.0 K/uL    Eos # 0.4 0.0 - 0.5 K/uL    Baso # 0.05 0.00 - 0.20 K/uL    nRBC 0 0 /100 WBC    Gran % 59.0 38.0 - 73.0 %    Lymph % 21.4 18.0 - 48.0 %    Mono % 12.5 4.0 - 15.0 %    Eosinophil % 5.8 0.0 - 8.0 %    Basophil % 0.8 0.0 - 1.9 %    Differential Method Automated    COMPREHENSIVE METABOLIC PANEL    Collection Time: 12/04/24  7:35 AM   Result Value Ref Range    Sodium 139 136 - 145 mmol/L    Potassium 5.0 3.5 - 5.1 mmol/L    Chloride 107 95 - 110 mmol/L    CO2 20 (L) 23 - 29 mmol/L    Glucose 123 (H) 70 - 110 mg/dL    BUN 17 8 - 23 mg/dL    Creatinine 1.0 0.5 - 1.4 mg/dL    Calcium 9.5 8.7 - 10.5 mg/dL    Total Protein 6.8 6.0 - 8.4 g/dL    Albumin 3.9 3.5 - 5.2 g/dL    Total Bilirubin 0.4 0.1 - 1.0 mg/dL    Alkaline Phosphatase 35 (L) 40 - 150 U/L    AST 22 10 - 40 U/L    ALT 21 10 - 44 U/L    eGFR >60.0 >60 mL/min/1.73 m^2    Anion Gap 12 8 - 16 mmol/L   Hemoglobin A1C    Collection Time: 12/04/24  7:35 AM   Result Value Ref Range    Hemoglobin A1C 6.2 (H) 4.0 - 5.6 %    Estimated Avg Glucose 131 68 - 131 mg/dL   TSH    Collection Time: 12/04/24  7:35 AM   Result Value Ref Range    TSH 1.862 0.400 - 4.000 uIU/mL   IMMUNOGLOBULIN FREE LT  CHAINS BLOOD    Collection Time: 12/04/24  7:35 AM   Result Value Ref Range    Kappa Free Light Chains 2.65 (H) 0.33 - 1.94 mg/dL    Lambda Free Light Chains 2.06 0.57 - 2.63 mg/dL    Kappa/Lambda FLC Ratio 1.29 0.26 - 1.65   PROTEIN ELECTROPHORESIS, SERUM    Collection Time: 12/04/24  7:35 AM   Result Value Ref Range    Protein, Serum 6.7 6.0 - 8.4 g/dL    Albumin 4.19 3.35 - 5.55 g/dL    Alpha-1 0.25 0.17 - 0.41 g/dL    Alpha-2 0.62 0.43 - 0.99 g/dL    Beta 0.74 0.50 - 1.10 g/dL    Gamma 0.90 0.67 - 1.58 g/dL   IMMUNOFIXATION ELECTROPHORESIS, SERUM    Collection Time: 12/04/24  7:35 AM   Result Value Ref Range    Immunofix Interp. SEE COMMENT    PROSTATE SPECIFIC ANTIGEN, DIAGNOSTIC    Collection Time: 12/04/24  7:35 AM   Result Value Ref Range    PSA Diagnostic 2.2 0.00 - 4.00 ng/mL   Pathologist Interpretation FLOR    Collection Time: 12/04/24  7:35 AM   Result Value Ref Range    Pathologist Interpretation FLOR REVIEWED    Pathologist Interpretation SPE    Collection Time: 12/04/24  7:35 AM   Result Value Ref Range    Pathologist Interpretation SPE REVIEWED           Assessment/Plan:     1) Annual wellness exam  2) Elevated kappa FLC - Stable with no concerning lab changes otherwise. Will continue to monitor q6-12m.  3) HTN - Controlled on Ramipril 5 bid.  4) CAD - Lipids close to goal on Crestor 20. Cards following and planning for repeat lipids in early 2025 per pt. Limit saturated fats.  5) DELVIN - Using nightly cpap.  6) Insomnia - Stable on Lunesta 3 qhs.  7) Skin cancer hx - Sees Derm regularly for skin screening.  8) Kidney stone hx - No recent issues. Continue hydration focus.  9) Bladder cancer hx - Follows with Urology for surveillance.  10) Prediabetes - Uncontrolled. Will be focusing more aggressively on wt loss, diet, exercise. We did discuss the option of medication. Repeat A1c 6 to 12 mths and may consider at that point depending on progress.  11) Right tennis elbow - Discussed avoiding aggravating  exercises during flairs. May consider using brace during activities such as pickleball. Okay to continue Celebrex bid prn. May consider PT and/or Sports Med if worsens or persists. Overall he feels improving.  12) Right cerumen impaction - ENT referral for cleaning.    - Vaccines reviewed.  - No plan for further colon screening.  - PSA normal.

## 2025-01-02 DIAGNOSIS — G47.00 INSOMNIA, UNSPECIFIED TYPE: ICD-10-CM

## 2025-01-02 RX ORDER — ESZOPICLONE 3 MG/1
3 TABLET, FILM COATED ORAL NIGHTLY
Qty: 30 TABLET | Refills: 2 | Status: SHIPPED | OUTPATIENT
Start: 2025-01-02

## 2025-01-02 NOTE — TELEPHONE ENCOUNTER
No care due was identified.  [facility] Embedded Care Due Messages. Reference number: [id number].   1/02/2025 8:48:17 AM CST

## 2025-01-02 NOTE — TELEPHONE ENCOUNTER
Refill Routing Note   Medication(s) are not appropriate for processing by [facility] for the following reason(s):        Outside of protocol    [facility] action(s):  Route               Appointments  past 12m or future 3m with PCP    Date Provider   Last Visit   12/10/2024 Huseyin Rice MD   Next Visit   Visit date not found Huseyin Rice MD   ED visits in past 90 days: 0        Note composed:9:33 AM 01/02/2025

## 2025-01-10 NOTE — PROGRESS NOTES
Subjective:   Gurjit Valentin is a 84 y.o. male with PMHx of BCC, bladder cancer, DELVIN (uses cpap), HTN, CAD and prediabetes who presents for cerumen impaction. PCP recently noted right CI. He feels he hears well. Notes some difficulty hearing in noisy environments. His wife is bothered by his hearing. States his last audiogram was about 10 years ago and showed a high frequency hearing loss. He denies otalgia, otorrhea or tinnitus. There is not a prior history of ear surgery. There is not a prior history of ear infections. There is not a history of ear trauma. He denies a history of significant noise exposure. Patient also reporting nasal issues over the last 2 years. Has been treated for nasal/sinus infections with oral antibiotics about 2-3 times within the last 2 years. He typically presents with nasal pain. Most recently started with nasal pain about 3 weeks ago however it resolved on it's own after a few days. Per chart review, patient last treated by PCP for nasal sore 4/2023 with Bactroban and oral Doxycycline. Notes intermittent episodes of nasal obstruction mostly at night which he uses Afrin occasionally for (once per month). Does endorse recurrent nasal picking and blowing. Denies facial pressure, rhinorrhea or prior sinonasal surgeries. Reports occasionally nose bleeds about once per month. He packs his nose with tissue and it usually resolves within a few minutes. He cannot recall which side bleeds. He reports history of AR which he was treated with allergy shots for in his 20s-30s. Does not currently feel allergies are an issue for him.    Past Medical History  He has a past medical history of Allergy, Basal cell carcinoma, Basal cell carcinoma of right cheek, Bladder cancer, BPH with obstruction/lower urinary tract symptoms, Cataract, Chronic cough, Coronary artery disease, Ganglion cyst of wrist, left, Hyperlipidemia, Increased prostate specific antigen (PSA) velocity, Insomnia, Kidney stone,  Personal history of colonic polyps, Prediabetes, Skin cancer, Sleep apnea, Squamous cell carcinoma of mandible, and Transient global amnesia.    Past Surgical History  He has a past surgical history that includes Eye surgery (Bilateral); Tonsillectomy; Lithotripsy; Kidney stone surgery; Bladder surgery; Cystoscopy; Cardiac catheterization; Colonoscopy (N/A, 12/04/2017); Hernia repair; turbt (transurethral resection of bladder tumor) (N/A, 4/8/2024); and Cystoscopy (N/A, 4/8/2024).    Family History  His family history includes Heart attack in his brother; Hypertension in his brother and mother; Liver cancer in his mother; No Known Problems in his father, maternal aunt, maternal grandfather, maternal grandmother, maternal uncle, paternal aunt, paternal grandfather, paternal grandmother, paternal uncle, and sister.    Social History  He reports that he quit smoking about 37 years ago. His smoking use included cigarettes. He started smoking about 57 years ago. He has a 5 pack-year smoking history. He has never been exposed to tobacco smoke. He has never used smokeless tobacco. He reports that he does not currently use alcohol. He reports that he does not use drugs.    Allergies  He has No Known Allergies.    Medications  He has a current medication list which includes the following prescription(s): ascorbic acid (vitamin c), celecoxib, eszopiclone, melatonin, mupirocin, omega-3 fatty acids, ramipril, and rosuvastatin.    Review of Systems     Constitutional: Negative for chills and fever.      HENT: Positive for nosebleeds and postnasal drip.  Negative for ear discharge, ear infection, ear pain, hearing loss, ringing in the ears, runny nose and sinus pressure.          Objective:       Ears:    Right Ear: No drainage, swelling or tenderness. Tympanic membrane is not scarred, not perforated, not erythematous and not retracted. No middle ear effusion.   Left Ear: No drainage, swelling or tenderness. Tympanic membrane is  "not scarred, not perforated, not erythematous and not retracted.  No middle ear effusion.   Ceruminous debris obstructing right TM, removed via microscopy.    Nose:  No rhinorrhea.   Septal perforation with erythematous edges with crusting. Exam limited with anterior rhinoscopy.    Pulmonary/Chest:   Effort normal.       Procedure  Cerumen removal performed.  See procedure note.  Procedure Note:  The patient was brought to the minor procedure room and placed under the operating microscope of the right ear canal which was cleaned of ceruminous debris. Using a combination of suction, curettes and cup forceps the patient's cerumen was removed. The patient tolerated the procedure well. There were no complications.       Imaging:   CT head and face wo contrast 12/13/17 reviewed.       Assessment:     1. Impacted cerumen of right ear    2. Nasal septal perforation      Plan:   Gurjit Acevedo" was seen today for cerumen impaction.  Diagnoses and all orders for this visit:    Nasal septal perforation  -     mupirocin (BACTROBAN) 2 % ointment; by Nasal route 3 (three) times daily for 14 days  Septal perforation noted on exam with crusting. Will treat with Mupirocin for 2 weeks. Follow up with Dr. Stover in one month for further evaluation and treatment. Plan discussed with Dr. Stover. Avoidance of anything in the nose discussed with patient.    Impacted cerumen of right ear  Removed via micrscopy. RTC PRN. Audiogram offered today however unable to obtain given scheduling issues. Hearing test scheduled for a later date with audiology.       "

## 2025-01-13 ENCOUNTER — OFFICE VISIT (OUTPATIENT)
Dept: OTOLARYNGOLOGY | Facility: CLINIC | Age: 85
End: 2025-01-13
Payer: MEDICARE

## 2025-01-13 DIAGNOSIS — I10 PRIMARY HYPERTENSION: ICD-10-CM

## 2025-01-13 DIAGNOSIS — I25.10 CORONARY ARTERY DISEASE INVOLVING NATIVE CORONARY ARTERY WITHOUT ANGINA PECTORIS, UNSPECIFIED WHETHER NATIVE OR TRANSPLANTED HEART: ICD-10-CM

## 2025-01-13 DIAGNOSIS — H61.21 IMPACTED CERUMEN OF RIGHT EAR: Primary | ICD-10-CM

## 2025-01-13 DIAGNOSIS — J34.89 NASAL SEPTAL PERFORATION: ICD-10-CM

## 2025-01-13 DIAGNOSIS — E78.5 HYPERLIPIDEMIA, UNSPECIFIED HYPERLIPIDEMIA TYPE: ICD-10-CM

## 2025-01-13 PROCEDURE — 69210 REMOVE IMPACTED EAR WAX UNI: CPT | Mod: PBBFAC

## 2025-01-13 PROCEDURE — 99999 PR PBB SHADOW E&M-EST. PATIENT-LVL II: CPT | Mod: PBBFAC,,,

## 2025-01-13 PROCEDURE — 99213 OFFICE O/P EST LOW 20 MIN: CPT | Mod: 25,S$PBB,,

## 2025-01-13 PROCEDURE — 99212 OFFICE O/P EST SF 10 MIN: CPT | Mod: PBBFAC

## 2025-01-13 PROCEDURE — 69210 REMOVE IMPACTED EAR WAX UNI: CPT | Mod: S$PBB,,,

## 2025-01-13 RX ORDER — MUPIROCIN 20 MG/G
OINTMENT TOPICAL 3 TIMES DAILY
Qty: 30 G | Refills: 0 | Status: SHIPPED | OUTPATIENT
Start: 2025-01-13 | End: 2025-01-27

## 2025-01-13 RX ORDER — ROSUVASTATIN CALCIUM 20 MG/1
20 TABLET, COATED ORAL
Qty: 90 TABLET | Refills: 3 | Status: SHIPPED | OUTPATIENT
Start: 2025-01-13

## 2025-01-13 RX ORDER — RAMIPRIL 10 MG/1
10 CAPSULE ORAL DAILY
Qty: 90 CAPSULE | Refills: 3 | Status: SHIPPED | OUTPATIENT
Start: 2025-01-13 | End: 2026-01-13

## 2025-01-13 NOTE — TELEPHONE ENCOUNTER
----- Message from Michelle sent at 1/13/2025 11:07 AM CST -----  Regarding: refills    Mr Valentin needs Crestor and Altace refilled sent to [facility]. He is taking Altace 5mg twice a day. Does it come in 10mg? If so please order Altace 10mg    Thanks  Michelle

## 2025-01-13 NOTE — TELEPHONE ENCOUNTER
No care due was identified.  [facility] Embedded Care Due Messages. Reference number: [id number].   1/13/2025 9:34:14 AM CST

## 2025-01-14 DIAGNOSIS — Z00.00 ENCOUNTER FOR MEDICARE ANNUAL WELLNESS EXAM: ICD-10-CM

## 2025-01-17 ENCOUNTER — TELEPHONE (OUTPATIENT)
Dept: INTERNAL MEDICINE | Facility: CLINIC | Age: 85
End: 2025-01-17
Payer: MEDICARE

## 2025-01-17 DIAGNOSIS — M25.521 RIGHT ELBOW PAIN: Primary | ICD-10-CM

## 2025-01-22 NOTE — TELEPHONE ENCOUNTER
Ongoing right elbow pain. No recent trauma. Conservative measures partially helpful thus far. Agreed on Sports Med eval.

## 2025-01-30 ENCOUNTER — LAB VISIT (OUTPATIENT)
Dept: LAB | Facility: HOSPITAL | Age: 85
End: 2025-01-30
Attending: INTERNAL MEDICINE
Payer: MEDICARE

## 2025-01-30 ENCOUNTER — TELEPHONE (OUTPATIENT)
Dept: INTERNAL MEDICINE | Facility: CLINIC | Age: 85
End: 2025-01-30
Payer: MEDICARE

## 2025-01-30 DIAGNOSIS — E78.2 MIXED HYPERLIPIDEMIA: ICD-10-CM

## 2025-01-30 LAB
CHOLEST SERPL-MCNC: 145 MG/DL (ref 120–199)
CHOLEST/HDLC SERPL: 2 {RATIO} (ref 2–5)
HDLC SERPL-MCNC: 73 MG/DL (ref 40–75)
HDLC SERPL: 50.3 % (ref 20–50)
LDLC SERPL CALC-MCNC: 59.8 MG/DL (ref 63–159)
NONHDLC SERPL-MCNC: 72 MG/DL
TRIGL SERPL-MCNC: 61 MG/DL (ref 30–150)

## 2025-01-30 PROCEDURE — 80061 LIPID PANEL: CPT | Performed by: INTERNAL MEDICINE

## 2025-01-30 PROCEDURE — 36415 COLL VENOUS BLD VENIPUNCTURE: CPT | Mod: PO | Performed by: INTERNAL MEDICINE

## 2025-02-13 ENCOUNTER — OFFICE VISIT (OUTPATIENT)
Dept: OTOLARYNGOLOGY | Facility: CLINIC | Age: 85
End: 2025-02-13
Payer: MEDICARE

## 2025-02-13 ENCOUNTER — CLINICAL SUPPORT (OUTPATIENT)
Dept: AUDIOLOGY | Facility: CLINIC | Age: 85
End: 2025-02-13
Payer: MEDICARE

## 2025-02-13 DIAGNOSIS — H90.3 SENSORINEURAL HEARING LOSS OF BOTH EARS: Primary | ICD-10-CM

## 2025-02-13 DIAGNOSIS — J34.89 NASAL SEPTAL PERFORATION: Primary | ICD-10-CM

## 2025-02-13 DIAGNOSIS — H90.3 BILATERAL SENSORINEURAL HEARING LOSS: ICD-10-CM

## 2025-02-13 PROCEDURE — 99212 OFFICE O/P EST SF 10 MIN: CPT | Mod: PBBFAC | Performed by: OTOLARYNGOLOGY

## 2025-02-13 PROCEDURE — 92567 TYMPANOMETRY: CPT | Mod: PBBFAC | Performed by: AUDIOLOGIST

## 2025-02-13 PROCEDURE — 92557 COMPREHENSIVE HEARING TEST: CPT | Mod: PBBFAC | Performed by: AUDIOLOGIST

## 2025-02-13 PROCEDURE — 99999 PR PBB SHADOW E&M-EST. PATIENT-LVL II: CPT | Mod: PBBFAC,,, | Performed by: OTOLARYNGOLOGY

## 2025-02-13 NOTE — PROGRESS NOTES
Ear, Nose, & Throat  Otolaryngology - Head & Neck Surgery    Summary of Visit:  Gurjit Valentin is a kind patient who was seen in follow-up for Follow-up      Subjective:     Chief Complaint:   Chief Complaint   Patient presents with    Follow-up       Gurjit Valentin is a 84 y.o. male who returns to clinic for reassessment of his septum.  He has noted significant improvement in the crusting in the nose since initiation of the antibiotic cream.  He has not experienced any bleeding nor does he note any nasal airway obstruction.  Audiogram was also performed today and is available for review..    Past Medical History  Active Ambulatory Problems     Diagnosis Date Noted    Coronary artery disease: Known 50% LAD stenosis. 07/18/2012    Insomnia 07/18/2012    Back pain of thoracolumbar region 07/18/2012    Hyperlipidemia: LDL at goal 10/04/2012    Class 2 severe obesity due to excess calories with serious comorbidity in adult, unspecified BMI 10/04/2012    DELVIN (obstructive sleep apnea) 10/04/2012    Bladder cancer 02/25/2013    AR (allergic rhinitis) 10/06/2014    Recurrent nephrolithiasis 10/06/2014    Meralgia paresthetica of left side 10/15/2015    Dermatitis 10/15/2015    Transient global amnesia 09/20/2017    Vitreous hemorrhage, right 02/06/2020    Posterior vitreous detachment, bilateral 02/06/2020    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    Hypertension     Chronic pain of left knee 04/11/2023    Chondromalacia, patella, left 04/11/2023    Gross hematuria 03/12/2024    BPH (benign prostatic hyperplasia) 03/26/2024    Prediabetes 03/27/2024    Aortic atherosclerosis 12/05/2024     Resolved Ambulatory Problems     Diagnosis Date Noted    Increased prostate specific antigen (PSA) velocity 10/15/2015     Past Medical History:   Diagnosis Date    Allergy     Basal cell carcinoma     Basal cell carcinoma of right cheek 03/29/2023    BPH with obstruction/lower urinary tract symptoms      Cataract     Chronic cough     Ganglion cyst of wrist, left     Kidney stone     Personal history of colonic polyps     Skin cancer     Sleep apnea     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       Past Surgical History  He has a past surgical history that includes Eye surgery (Bilateral); Tonsillectomy; Lithotripsy; Kidney stone surgery; Bladder surgery; Cystoscopy; Cardiac catheterization; Colonoscopy (N/A, 12/04/2017); Hernia repair; turbt (transurethral resection of bladder tumor) (N/A, 4/8/2024); and Cystoscopy (N/A, 4/8/2024).    Past Surgical History:   Procedure Laterality Date    BLADDER SURGERY      Ca excision    CARDIAC CATHETERIZATION      COLONOSCOPY N/A 12/04/2017    No further screening planned.    CYSTOSCOPY      CYSTOSCOPY N/A 4/8/2024    Procedure: CYSTOSCOPY;  Surgeon: Jose Kyle MD;  Location: [facility] OR [address] [place] [address];  Service: Urology;  Laterality: N/A;    EYE SURGERY Bilateral     Cataract    HERNIA REPAIR      KIDNEY STONE SURGERY      LITHOTRIPSY      TONSILLECTOMY      at age 4    TURBT (TRANSURETHRAL RESECTION OF BLADDER TUMOR) N/A 4/8/2024    Procedure: TURBT (TRANSURETHRAL RESECTION OF BLADDER TUMOR);  Surgeon: Jose Kyle MD;  Location: [facility] OR [address] [place] [address];  Service: Urology;  Laterality: N/A;        Family History  His family history includes Heart attack in his brother; Hypertension in his brother and mother; Liver cancer in his mother; No Known Problems in his father, maternal aunt, maternal grandfather, maternal grandmother, maternal uncle, paternal aunt, paternal grandfather, paternal grandmother, paternal uncle, and sister.    Social History  He reports that he quit smoking about 37 years ago. His smoking use included cigarettes. He started smoking about 57 years ago. He has a 5 pack-year smoking history. He has never been exposed to tobacco smoke. He has never used smokeless tobacco. He reports that he does not currently use alcohol. He reports that he does not use  drugs.    Allergies  He has No Known Allergies.    Medications  He has a current medication list which includes the following prescription(s): ascorbic acid (vitamin c), celecoxib, eszopiclone, melatonin, omega-3 fatty acids, ramipril, and rosuvastatin.    ROS:  Pertinent positive and negative review of systems as noted in HPI.     Objective:     There were no vitals taken for this visit.   General Appearance:   Awake, Alert and Oriented. NAD. Appropriate affect and appearance      Neuro:   Spontaneous eye opening, appropriate verbal responses, follows commands  Pupils equal, round & brisk. EOMI, no proptosis, no spontaneous nystagmus  Face is symmetric, HB I, non-edematous and SILT bilaterally  Vision grossly intact, Hearing grossly intact  ANGELICA, normal tone     Head and Face:   skin is intact, facial movement symmetric     Ears:  Periauricular regions non-erythematous, non-fluctuanct non-tender  Pinna normal bilaterally, no skin lesions  EACs patent and without drainage bilaterally   Tympanic membranes are normal in appearance bilaterally.  No middle ear effusion noted bilaterally.    Nose:   External nose is symmetric, no skin lesions  Septum midline with 12 x 15 mm perforation.  Moderate crusting is noted on the posterior margin of the perforation with instability of the cartilage and bone in this area.  Biopsy was performed with cup forceps.  Hemostasis was noted.  No inferior turbinate hypertrophy, No polyps or rhinorrhea     OC/OP:  Tongue midline on extension, non-edematous, soft  No labial, buccal, oral tongue or floor of mouth lesions  Soft palate symmetric, midline and without lesions or masses, tonsils symmetric  No masses or lesions of the visualized oropharynx     Neck:  Neck is symmetric, non-edematous, non-erythematous  Trachea is midline and easily palpable,  No palpable adenopathy or masses in levels I-VI     Glands:  Parotid and submandibular glands are symmetric and non-tender.   No thyroid  nodules or masses, non-tender      Respiratory:  Normal work of breathing, no accessory muscle use, no stridor     Voice:  Normal vocal quality, volume, prosody and articulation   Data Review:       AUDIO        Procedures:       Assessment:     1. Nasal septal perforation    2.      Bilateral sensorineural hearing loss    Plan:     I had a long discussion with the patient regarding his condition and the further workup and management options.  The etiology of his septal perforation is not clear.  He has no history of trauma or prior surgery to which to attribute this defect in the septum.  Thus, biopsy was performed to assess for granulomatous disease, neoplastic disease, autoimmune disease.  I will contact him with results once available.  Consideration for hearing aids could be given.  Recommend repeat audiogram in 1 year.    No orders of the defined types were placed in this encounter.         Problem List Items Addressed This Visit    None  Visit Diagnoses       Nasal septal perforation    -  Primary    Relevant Orders    Specimen to Pathology ENT

## 2025-02-13 NOTE — PROGRESS NOTES
Mr. Gurjit Valentin was seen in the clinic today for an audiological evaluation.  Mr. Valentin denied hearing loss, tinnitus, otalgia, and vertigo.    Audiological testing revealed normal hearing sloping to a mild to severe sensorineural hearing loss for the right ear and a mild to severe sensorineural hearing loss for the left ear.  A speech reception threshold was obtained at 20 dBHL for the right ear and at 25 dBHL for the left ear.  Speech discrimination was 96% for the right ear and 100% for the left ear.      Tympanometry testing revealed a Type A tympanogram for the right ear and a Type A tympanogram for the left ear.      Recommendations:  1. Otologic evaluation  2. Annual audiological evaluation  3. Hearing protection when in noise   4. Hearing aid consultation

## 2025-02-18 ENCOUNTER — RESULTS FOLLOW-UP (OUTPATIENT)
Dept: OTOLARYNGOLOGY | Facility: CLINIC | Age: 85
End: 2025-02-18

## 2025-02-19 ENCOUNTER — PATIENT MESSAGE (OUTPATIENT)
Dept: CARDIOLOGY | Facility: CLINIC | Age: 85
End: 2025-02-19
Payer: MEDICARE

## 2025-02-19 ENCOUNTER — HOSPITAL ENCOUNTER (OUTPATIENT)
Dept: RADIOLOGY | Facility: HOSPITAL | Age: 85
Discharge: HOME | End: 2025-02-19
Attending: FAMILY MEDICINE
Payer: MEDICARE

## 2025-02-19 ENCOUNTER — OFFICE VISIT (OUTPATIENT)
Dept: SPORTS MEDICINE | Facility: CLINIC | Age: 85
End: 2025-02-19
Payer: MEDICARE

## 2025-02-19 VITALS — WEIGHT: 217.63 LBS | BODY MASS INDEX: 30.47 KG/M2 | HEIGHT: 71 IN | TEMPERATURE: 98 F

## 2025-02-19 DIAGNOSIS — M25.521 RIGHT ELBOW PAIN: ICD-10-CM

## 2025-02-19 DIAGNOSIS — M19.021 OSTEOARTHROSIS OF RIGHT ELBOW: ICD-10-CM

## 2025-02-19 DIAGNOSIS — M67.80 TENDINOSIS: ICD-10-CM

## 2025-02-19 DIAGNOSIS — M77.11 RIGHT LATERAL EPICONDYLITIS: Primary | ICD-10-CM

## 2025-02-19 PROCEDURE — 99999 PR PBB SHADOW E&M-EST. PATIENT-LVL III: CPT | Mod: PBBFAC,,, | Performed by: FAMILY MEDICINE

## 2025-02-19 PROCEDURE — 99213 OFFICE O/P EST LOW 20 MIN: CPT | Mod: PBBFAC,25 | Performed by: FAMILY MEDICINE

## 2025-02-19 PROCEDURE — 73080 X-RAY EXAM OF ELBOW: CPT | Mod: TC,RT

## 2025-02-19 PROCEDURE — 76942 ECHO GUIDE FOR BIOPSY: CPT | Mod: PBBFAC | Performed by: FAMILY MEDICINE

## 2025-02-19 RX ORDER — TRIAMCINOLONE ACETONIDE 40 MG/ML
40 INJECTION, SUSPENSION INTRA-ARTICULAR; INTRAMUSCULAR
Status: DISCONTINUED | OUTPATIENT
Start: 2025-02-19 | End: 2025-02-19 | Stop reason: HOSPADM

## 2025-02-19 RX ADMIN — TRIAMCINOLONE ACETONIDE 40 MG: 40 INJECTION, SUSPENSION INTRA-ARTICULAR; INTRAMUSCULAR at 10:02

## 2025-02-19 NOTE — PROCEDURES
"Tendon Origin: R elbow    Date/Time: 2/19/2025 10:45 AM    Performed by: Baljinder Bledsoe MD  Authorized by: Baljinder Bledsoe MD    Consent Done?:  Yes (Verbal)  Timeout: prior to procedure the correct patient, procedure, and site was verified    Indications:  Pain  Site marked: the procedure site was marked    Timeout: prior to procedure the correct patient, procedure, and site was verified    Location:  Elbow  Site:  R elbow  Prep: patient was prepped and draped in usual sterile fashion    Ultrasonic Guidance for Needle Placement?: Yes    Needle size:  25 G  Approach:  Anterolateral  Medications:  40 mg triamcinolone acetonide 40 mg/mL  Patient tolerance:  Patient tolerated the procedure well with no immediate complications   Origin of common extensor tendon at the lateral epicondyle  Description of ultrasound utilization for needle guidance:   Ultrasound guidance used for needle localization. Images saved and stored for documentation. The common extensor tendon was visualized at its origin at the lateral epicondyle. Dynamic visualization of the 25g x 1.5" needle was continuous throughout the procedure.    "

## 2025-02-19 NOTE — PROGRESS NOTES
HISTORY OF PRESENT ILLNESS   Gurjit Valentin, a 84 y.o. male, presents today for evaluation of his right ELBOW. Patient is right hand dominant.    Patient reports onset of chronic pain beginning Date: October 2024. Patient reports  when he was pulling heavy gil . Pain is located along lateral aspect of ELBOW. Pain is 0/10 at present & up to 4/10 with provacative activity including ADLs. Pain is described as sharp and ache. Patient states pain does radiate.     Associated symptoms include decreased ROM. Pain is aggravated by activities above & occur daily . Symptoms do interfere with sleep. Patient reports pain & symptoms are staying the same. Patient reports no prior surgery to elbow.     Prior treatment Gurjit Valentin has tried   OTC Acetaminophen - No  OTC NSAID - No   Rx NSAID - Yes - Celebrex   Rx Narcotic/Other - No   Brace - No   Injection - Cortisone - No   Injection - Biologics - No   Activity Modification - Yes  Physical Therapy - No   Home Exercise Program - No  Assistive Device - No  Other -  none     Review of systems (ROS):  A 10+ review of systems was performed with pertinent positives and negatives noted above in the history of present illness. Other systems were negative unless otherwise specified.    PHYSICAL EXAMINATION  General: Patient appears alert and oriented x 3.  Mood is pleasant.  Observation of ears, eyes and nose reveal no gross abnormalities. HEENT: NCAT, sclera nonicteric  Lungs: Respirations are equal and unlabored.  Well nourished, in no acute distress and ambulates with a non-antalgic gait with no assistive devices.    Skin: Skin intact bilaterally. Sensation intact bilaterally. Compartments soft. No evidence of edema, infection, or induration.     ELBOW EXAMINATION    Observation/Inspection  Swelling  none    Deformity  none  Discoloration  none     Scars   none    Atrophy  none    Tenderness            Medial epicondyle   No   Med. (Ulnar) collateral ligament  No   Flexor  pronator Musculature   No   Biceps tendon    No   Head of radius    No   Lateral epicondyle   Yes   Extensor Musculature   Yes   Brachioradialis    No   Triceps tendon   No   Triceps muscle   No   Olecranon    No   Olecranon bursa   No   Cubital fossa    No   Anterior jointline   No   Radial tunnel    No              ROM: ('*' = with pain)    Right Elbow  AROM (PROM)     Extension   0 deg  (5 deg)   Flexion   145 deg (145 deg)   Pronation  90 deg  (90 deg)  Supination   80 deg  (80 deg)     Left Elbow  AROM (PROM)   Extension   0 deg  (5 deg)  Flexion   145 deg (145 deg)   Pronation  90 deg  (90 deg)   Supination   80 deg  (80 deg)      Right Wrist  AROM (PROM)   Extension   80 deg (85 deg)  Flexion   80 deg (85 deg)   Ulnar Deviation   35 deg (40 deg)  Radial Deviation 35 deg (40 deg)     Left Wrist   AROM (PROM)  Extension   80 deg (85 deg)  Flexion   80 deg (85 deg)   Ulnar Deviation   35 deg (40 deg)  Radial Deviation 35 deg (40 deg)     Strength: ('*' = with pain)    Elbow Flexion   5/5  Elbow Extension  5/5  Wrist Flexion   5/5  Wrist Extension  5/5      5/5  Intrinsics   5/5  EPL (Ext. pollicis longus) 5/5  Pinch Mechanism  5/5    Elbow Examination:  See above noted areas of tenderness.   Test for Ligamentous Instability - UCL normal  Test for Ligamentous Instability - LUCL normal  PLRI       -  Tinel's (Percussion) Test - Cubital  -  Tennis Elbow Test    -  Golfer's Elbow Test    -  Radial Capitellar Grind Test   -  Valgus/Extension Overload Test  -  Resisted Long Finger Extension Pain -  Moving Valgus    -  Forearm pain with resisted supination -  Yeargeson' s (elbow pain)   -  Hook test     -    Wrist Examination:  See above noted areas of tenderness.   Finkelstein's Test    -  Tinel's Test - Carpal Tunnel   -  Phalen's Test     -  Median Nerve Compression Test  -  Ulnar-sided Compression Test  -  LT Ballottment Test    -  Snuff box tenderness    -  Hawley's Test     -  LT Instability     -  Hook of  Hamate Tenderness   -     Extremity Neuro-vascular Testing: Sensation grossly intact to light touch all dermatomal regions. DTR 2+ Biceps, Triceps, BR and Negative Swathi's sign. Grossly intact motor function at Elbow, Wrist and Hand. Distal pulses radial and ulnar 2+, brisk cap refill, symmetric.    Other Findings:    ASSESSMENT & PLAN   Assessment:   #1 OA of elbow joint, right /d  W/ lateral epicondylitis  W/ tendinoitc changes of common extensor tendon of elbow    No evidence of neurologic pathology  No evidence of vascular pathology    Imaging studies reviewed:   X-ray elbow, right 25.02    Plan:      We discussed options including    Watchful waiting / relative rest x   Physical therapy    Injection therapy Csi CETendon right   Consultation    The patient chooses As above   x = prescribed  CSI = corticosteroid injection  VSI = viscosupplement injection  PRPI = platelet rich plasma injection  ia = intra articular  R = right  L = left  B = bilateral   nfSx = surgical consultation was recommended, but patient is not interested in consultation at this time    Physical Therapy        Formal (fPT), @ [facility] facility    Formal (fPT), @ [facility] facility        Homegoing (hgPT), per concurrent fPT recommendations    Homegoing (hgPT), per prior fPT recommendations    Homegoing (hgPT), handout provided        w/  (atPT)    [blank] = not prescribed  x = prescribed  b = prescribed, and begin as indicated  t = continue as indicated  r = prescribed, and restart as indicated  p = completed prior as indicated  hs = prescribed, and with high school   col = prescribed, and with college or university   nfPT = physical therapy was recommended, but patient is not interested in PT at this time    Activity (e.g. sports, work) restrictions    [blank] = as tolerated  pt = per physical therapist  at = per   NWB = non weight bearing on affected lower extremity, with crutches  assistance for ambulation    Bracing    [blank] = not prescribed  r = recommended, but not fit with at todays visit  f = prescribed and fit with at todays visit  t = continue as indicated  d = d/c  p = as needed  rare = use on rare, as-needed basis; advised against chronic use    Pain management    [blank] = No prescription necessary. A handout detailing dosing of appropriate   over-the-counter musculoskeletal analgesics was made available to the patient.   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

## 2025-03-11 ENCOUNTER — TELEPHONE (OUTPATIENT)
Dept: INTERNAL MEDICINE | Facility: CLINIC | Age: 85
End: 2025-03-11
Payer: MEDICARE

## 2025-03-11 DIAGNOSIS — J34.89 NASAL VESTIBULITIS: Primary | ICD-10-CM

## 2025-03-11 DIAGNOSIS — R05.9 COUGH, UNSPECIFIED TYPE: ICD-10-CM

## 2025-03-11 RX ORDER — BENZONATATE 200 MG/1
200 CAPSULE ORAL 3 TIMES DAILY PRN
Qty: 30 CAPSULE | Refills: 0 | Status: SHIPPED | OUTPATIENT
Start: 2025-03-11 | End: 2025-03-21

## 2025-03-11 RX ORDER — DOXYCYCLINE HYCLATE 100 MG
100 TABLET ORAL 2 TIMES DAILY
Qty: 14 TABLET | Refills: 0 | Status: SHIPPED | OUTPATIENT
Start: 2025-03-11 | End: 2025-03-18

## 2025-03-11 NOTE — TELEPHONE ENCOUNTER
Called and let patient know that his two medications were sent to his pharmacy. Patient verbalized understanding.

## 2025-03-11 NOTE — TELEPHONE ENCOUNTER
Ongoing but improving cough this past week. Tessalon pearles effective and would like refill. Using mupirocin ointment nasally for septal perforation. Would like antibiotic to have in case needed. Sending Doxy.

## 2025-06-16 DIAGNOSIS — G47.00 INSOMNIA, UNSPECIFIED TYPE: ICD-10-CM

## 2025-06-16 RX ORDER — ESZOPICLONE 3 MG/1
3 TABLET, FILM COATED ORAL NIGHTLY
Qty: 30 TABLET | Refills: 2 | Status: SHIPPED | OUTPATIENT
Start: 2025-06-16

## 2025-08-20 ENCOUNTER — TELEPHONE (OUTPATIENT)
Dept: SPORTS MEDICINE | Facility: CLINIC | Age: 85
End: 2025-08-20
Payer: MEDICARE

## 2025-08-20 ENCOUNTER — OFFICE VISIT (OUTPATIENT)
Dept: INTERNAL MEDICINE | Facility: CLINIC | Age: 85
End: 2025-08-20
Payer: MEDICARE

## 2025-08-20 DIAGNOSIS — M77.11 RIGHT TENNIS ELBOW: Primary | ICD-10-CM

## 2025-08-28 ENCOUNTER — HOSPITAL ENCOUNTER (OUTPATIENT)
Dept: RADIOLOGY | Facility: HOSPITAL | Age: 85
Discharge: HOME | End: 2025-08-28
Attending: INTERNAL MEDICINE
Payer: MEDICARE

## 2025-08-28 DIAGNOSIS — M89.8X1 PAIN OF RIGHT SCAPULA: ICD-10-CM

## 2025-08-28 PROCEDURE — 73010 X-RAY EXAM OF SHOULDER BLADE: CPT | Mod: TC,RT

## 2025-09-04 ENCOUNTER — TELEPHONE (OUTPATIENT)
Dept: SPORTS MEDICINE | Facility: CLINIC | Age: 85
End: 2025-09-04
Payer: MEDICARE

## 2025-09-05 ENCOUNTER — TELEPHONE (OUTPATIENT)
Dept: SPORTS MEDICINE | Facility: CLINIC | Age: 85
End: 2025-09-05
Payer: MEDICARE

## (undated) DEVICE — ADAPTER HOSE 10FT 8MM

## (undated) DEVICE — UNDERGLOVES BIOGEL PI SIZE 7.5

## (undated) DEVICE — LOOP CUTTING BPLR 24/26F .30MM

## (undated) DEVICE — PACK CYSTO

## (undated) DEVICE — GOWN X-LG STERILE BACK

## (undated) DEVICE — TRAY CYSTO BASIN OMC

## (undated) DEVICE — SOL IRR WATER STRL 3000 ML

## (undated) DEVICE — SYR 10CC LUER LOCK

## (undated) DEVICE — SET CYSTO IRRIGATION UNIV SPIK